# Patient Record
Sex: FEMALE | Race: WHITE | HISPANIC OR LATINO | URBAN - METROPOLITAN AREA
[De-identification: names, ages, dates, MRNs, and addresses within clinical notes are randomized per-mention and may not be internally consistent; named-entity substitution may affect disease eponyms.]

---

## 2017-02-28 ENCOUNTER — EMERGENCY (EMERGENCY)
Facility: HOSPITAL | Age: 52
LOS: 1 days | Discharge: HOME | End: 2017-02-28
Admitting: INTERNAL MEDICINE
Payer: COMMERCIAL

## 2017-02-28 DIAGNOSIS — K52.9 NONINFECTIVE GASTROENTERITIS AND COLITIS, UNSPECIFIED: ICD-10-CM

## 2017-02-28 DIAGNOSIS — S62.102A FRACTURE OF UNSPECIFIED CARPAL BONE, LEFT WRIST, INITIAL ENCOUNTER FOR CLOSED FRACTURE: Chronic | ICD-10-CM

## 2017-02-28 DIAGNOSIS — Z79.84 LONG TERM (CURRENT) USE OF ORAL HYPOGLYCEMIC DRUGS: ICD-10-CM

## 2017-02-28 DIAGNOSIS — Z98.89 OTHER SPECIFIED POSTPROCEDURAL STATES: Chronic | ICD-10-CM

## 2017-02-28 DIAGNOSIS — R42 DIZZINESS AND GIDDINESS: ICD-10-CM

## 2017-02-28 DIAGNOSIS — Z90.710 ACQUIRED ABSENCE OF BOTH CERVIX AND UTERUS: ICD-10-CM

## 2017-02-28 DIAGNOSIS — Z91.018 ALLERGY TO OTHER FOODS: ICD-10-CM

## 2017-02-28 DIAGNOSIS — E11.9 TYPE 2 DIABETES MELLITUS WITHOUT COMPLICATIONS: ICD-10-CM

## 2017-02-28 DIAGNOSIS — Z79.899 OTHER LONG TERM (CURRENT) DRUG THERAPY: ICD-10-CM

## 2017-02-28 DIAGNOSIS — Z98.51 TUBAL LIGATION STATUS: Chronic | ICD-10-CM

## 2017-02-28 DIAGNOSIS — Z91.011 ALLERGY TO MILK PRODUCTS: ICD-10-CM

## 2017-02-28 DIAGNOSIS — N31.8 OTHER NEUROMUSCULAR DYSFUNCTION OF BLADDER: Chronic | ICD-10-CM

## 2017-02-28 DIAGNOSIS — J45.909 UNSPECIFIED ASTHMA, UNCOMPLICATED: ICD-10-CM

## 2017-02-28 DIAGNOSIS — Z90.710 ACQUIRED ABSENCE OF BOTH CERVIX AND UTERUS: Chronic | ICD-10-CM

## 2017-02-28 DIAGNOSIS — E03.9 HYPOTHYROIDISM, UNSPECIFIED: ICD-10-CM

## 2017-02-28 DIAGNOSIS — N39.3 STRESS INCONTINENCE (FEMALE) (MALE): Chronic | ICD-10-CM

## 2017-02-28 PROCEDURE — 93010 ELECTROCARDIOGRAM REPORT: CPT

## 2017-02-28 PROCEDURE — 99285 EMERGENCY DEPT VISIT HI MDM: CPT | Mod: 25

## 2017-03-01 PROCEDURE — 82550 ASSAY OF CK (CPK): CPT

## 2017-03-01 PROCEDURE — 96375 TX/PRO/DX INJ NEW DRUG ADDON: CPT

## 2017-03-01 PROCEDURE — 96365 THER/PROPH/DIAG IV INF INIT: CPT

## 2017-03-01 PROCEDURE — 84484 ASSAY OF TROPONIN QUANT: CPT

## 2017-03-01 PROCEDURE — 85027 COMPLETE CBC AUTOMATED: CPT

## 2017-03-01 PROCEDURE — 82962 GLUCOSE BLOOD TEST: CPT

## 2017-03-01 PROCEDURE — 80048 BASIC METABOLIC PNL TOTAL CA: CPT

## 2017-03-01 PROCEDURE — 83690 ASSAY OF LIPASE: CPT

## 2017-03-01 PROCEDURE — 99284 EMERGENCY DEPT VISIT MOD MDM: CPT | Mod: 25

## 2017-03-01 PROCEDURE — 93005 ELECTROCARDIOGRAM TRACING: CPT

## 2017-03-01 PROCEDURE — 85610 PROTHROMBIN TIME: CPT

## 2017-03-01 PROCEDURE — 80076 HEPATIC FUNCTION PANEL: CPT

## 2017-03-01 PROCEDURE — 82553 CREATINE MB FRACTION: CPT

## 2017-03-01 PROCEDURE — 96376 TX/PRO/DX INJ SAME DRUG ADON: CPT

## 2017-03-01 PROCEDURE — 85730 THROMBOPLASTIN TIME PARTIAL: CPT

## 2017-03-24 ENCOUNTER — OUTPATIENT (OUTPATIENT)
Dept: OUTPATIENT SERVICES | Facility: HOSPITAL | Age: 52
LOS: 1 days | End: 2017-03-24
Payer: COMMERCIAL

## 2017-03-24 VITALS
HEART RATE: 84 BPM | HEIGHT: 62 IN | TEMPERATURE: 98 F | WEIGHT: 132.06 LBS | SYSTOLIC BLOOD PRESSURE: 172 MMHG | RESPIRATION RATE: 16 BRPM | DIASTOLIC BLOOD PRESSURE: 97 MMHG

## 2017-03-24 DIAGNOSIS — Z98.89 OTHER SPECIFIED POSTPROCEDURAL STATES: Chronic | ICD-10-CM

## 2017-03-24 DIAGNOSIS — N39.3 STRESS INCONTINENCE (FEMALE) (MALE): Chronic | ICD-10-CM

## 2017-03-24 DIAGNOSIS — N31.8 OTHER NEUROMUSCULAR DYSFUNCTION OF BLADDER: Chronic | ICD-10-CM

## 2017-03-24 DIAGNOSIS — M65.331 TRIGGER FINGER, RIGHT MIDDLE FINGER: ICD-10-CM

## 2017-03-24 DIAGNOSIS — J45.909 UNSPECIFIED ASTHMA, UNCOMPLICATED: ICD-10-CM

## 2017-03-24 DIAGNOSIS — G56.01 CARPAL TUNNEL SYNDROME, RIGHT UPPER LIMB: ICD-10-CM

## 2017-03-24 DIAGNOSIS — Z01.818 ENCOUNTER FOR OTHER PREPROCEDURAL EXAMINATION: ICD-10-CM

## 2017-03-24 DIAGNOSIS — Z98.51 TUBAL LIGATION STATUS: Chronic | ICD-10-CM

## 2017-03-24 DIAGNOSIS — E11.9 TYPE 2 DIABETES MELLITUS WITHOUT COMPLICATIONS: ICD-10-CM

## 2017-03-24 DIAGNOSIS — S62.102A FRACTURE OF UNSPECIFIED CARPAL BONE, LEFT WRIST, INITIAL ENCOUNTER FOR CLOSED FRACTURE: Chronic | ICD-10-CM

## 2017-03-24 DIAGNOSIS — Z90.710 ACQUIRED ABSENCE OF BOTH CERVIX AND UTERUS: Chronic | ICD-10-CM

## 2017-03-24 LAB
ALBUMIN SERPL ELPH-MCNC: 3.9 G/DL — SIGNIFICANT CHANGE UP (ref 3.3–5)
ALP SERPL-CCNC: 135 U/L — HIGH (ref 40–120)
ALT FLD-CCNC: 37 U/L — SIGNIFICANT CHANGE UP (ref 12–78)
ANION GAP SERPL CALC-SCNC: 9 MMOL/L — SIGNIFICANT CHANGE UP (ref 5–17)
AST SERPL-CCNC: 15 U/L — SIGNIFICANT CHANGE UP (ref 15–37)
BILIRUB SERPL-MCNC: 0.2 MG/DL — SIGNIFICANT CHANGE UP (ref 0.2–1.2)
BUN SERPL-MCNC: 10 MG/DL — SIGNIFICANT CHANGE UP (ref 7–23)
CALCIUM SERPL-MCNC: 9.3 MG/DL — SIGNIFICANT CHANGE UP (ref 8.5–10.1)
CHLORIDE SERPL-SCNC: 103 MMOL/L — SIGNIFICANT CHANGE UP (ref 96–108)
CO2 SERPL-SCNC: 28 MMOL/L — SIGNIFICANT CHANGE UP (ref 22–31)
CREAT SERPL-MCNC: 0.45 MG/DL — LOW (ref 0.5–1.3)
GLUCOSE SERPL-MCNC: 94 MG/DL — SIGNIFICANT CHANGE UP (ref 70–99)
HBA1C BLD-MCNC: 6.1 % — HIGH (ref 4–5.6)
HCG UR QL: NEGATIVE — SIGNIFICANT CHANGE UP
HCT VFR BLD CALC: 38.8 % — SIGNIFICANT CHANGE UP (ref 34.5–45)
HGB BLD-MCNC: 12.9 G/DL — SIGNIFICANT CHANGE UP (ref 11.5–15.5)
MCHC RBC-ENTMCNC: 31.7 PG — SIGNIFICANT CHANGE UP (ref 27–34)
MCHC RBC-ENTMCNC: 33.3 GM/DL — SIGNIFICANT CHANGE UP (ref 32–36)
MCV RBC AUTO: 95.2 FL — SIGNIFICANT CHANGE UP (ref 80–100)
PLATELET # BLD AUTO: 269 K/UL — SIGNIFICANT CHANGE UP (ref 150–400)
POTASSIUM SERPL-MCNC: 3.8 MMOL/L — SIGNIFICANT CHANGE UP (ref 3.5–5.3)
POTASSIUM SERPL-SCNC: 3.8 MMOL/L — SIGNIFICANT CHANGE UP (ref 3.5–5.3)
PROT SERPL-MCNC: 7.6 G/DL — SIGNIFICANT CHANGE UP (ref 6–8.3)
RBC # BLD: 4.07 M/UL — SIGNIFICANT CHANGE UP (ref 3.8–5.2)
RBC # FLD: 12.2 % — SIGNIFICANT CHANGE UP (ref 10.3–14.5)
SODIUM SERPL-SCNC: 140 MMOL/L — SIGNIFICANT CHANGE UP (ref 135–145)
WBC # BLD: 5.6 K/UL — SIGNIFICANT CHANGE UP (ref 3.8–10.5)
WBC # FLD AUTO: 5.6 K/UL — SIGNIFICANT CHANGE UP (ref 3.8–10.5)

## 2017-03-24 PROCEDURE — 80053 COMPREHEN METABOLIC PANEL: CPT

## 2017-03-24 PROCEDURE — 93010 ELECTROCARDIOGRAM REPORT: CPT | Mod: NC

## 2017-03-24 PROCEDURE — 83036 HEMOGLOBIN GLYCOSYLATED A1C: CPT

## 2017-03-24 PROCEDURE — 85027 COMPLETE CBC AUTOMATED: CPT

## 2017-03-24 PROCEDURE — 93005 ELECTROCARDIOGRAM TRACING: CPT

## 2017-03-24 PROCEDURE — 81025 URINE PREGNANCY TEST: CPT

## 2017-03-24 PROCEDURE — G0463: CPT

## 2017-03-24 RX ORDER — SODIUM CHLORIDE 9 MG/ML
1000 INJECTION, SOLUTION INTRAVENOUS
Qty: 0 | Refills: 0 | Status: DISCONTINUED | OUTPATIENT
Start: 2017-03-31 | End: 2017-03-31

## 2017-03-24 NOTE — H&P PST ADULT - NSANTHOSAYNRD_GEN_A_CORE
No. CAROL screening performed.  STOP BANG Legend: 0-2 = LOW Risk; 3-4 = INTERMEDIATE Risk; 5-8 = HIGH Risk

## 2017-03-24 NOTE — H&P PST ADULT - FAMILY HISTORY
Father  Still living? Yes, Estimated age: 87  Family history of diabetes mellitus (DM), Age at diagnosis: Age Unknown  Family history of BPH, Age at diagnosis: Age Unknown     Mother  Still living? Unknown  Family history of diabetes mellitus (DM), Age at diagnosis: Age Unknown  Family history of hypertension, Age at diagnosis: Age Unknown

## 2017-03-24 NOTE — H&P PST ADULT - ASSESSMENT
51 yo female scheduled for Right Carpal Tunnel Release - Trigger Finger Release Right 3rd Digit on 3/31/17 with Dr Ayers.

## 2017-03-24 NOTE — H&P PST ADULT - REASON FOR ADMISSION
Right Carpal Tunnel release and release of right Right Carpal Tunnel Release and Release of Right Third Finger

## 2017-03-24 NOTE — H&P PST ADULT - PMH
Asthma with allergic rhinitis    Asthma, stable    Carpal tunnel syndrome, right upper limb    Depression  controlled with meds  Difficulty urinating  laura for last 1 month  Dyslipidemia    Fibroids  uterine  Fibromyalgia    Foot fracture, right  s/p fall on 9/3/2014, no surgery recommended,, foot brace maintained f/u with Dr. Ld Jay (orthopedic)  Gastroparesis    Hypothyroid    Left knee pain    Ovarian cyst  left  Prediabetes    Stress incontinence in female    Thyroid nodule  s/p biopsy (benign)  Trigger finger, right middle finger Asthma with allergic rhinitis    Asthma, stable    Carpal tunnel syndrome, right upper limb    Depression  controlled with meds  Difficulty urinating  laura for last 1 month  Dyslipidemia    Fibroids  uterine  Fibromyalgia    Foot fracture, right  s/p fall on 9/3/2014, no surgery recommended,, foot brace maintained f/u with Dr. Ld Jay (orthopedic)  Gastroparesis    Hypothyroid    Left knee pain    Ovarian cyst  left  Prediabetes    Stress incontinence in female    Thyroid nodule  s/p biopsy (benign)  Trigger finger, right middle finger    Type 2 diabetes mellitus

## 2017-03-24 NOTE — H&P PST ADULT - HISTORY OF PRESENT ILLNESS
51 yo female scheduled for Right Carpal Tunnel Release - Trigger Finger Release Right 3rd Digit on 3/31/17 with Dr Ayers  Patient states she has pain in both hands with numbness in both hands. Pain wakes her up at night  right third finger has been locking for many years 53 yo female scheduled for Right Carpal Tunnel Release - Trigger Finger Release Right 3rd Digit on 3/31/17 with Dr Ayers.  Patient states she has pain in both hands with numbness since December 2016. Patient states the pain wakes her up at night.  The right third finger has been locking for many years.

## 2017-03-24 NOTE — H&P PST ADULT - NEGATIVE ENMT SYMPTOMS
no nasal discharge/no hearing difficulty/no sinus symptoms/no ear pain/no nasal congestion/no tinnitus

## 2017-03-24 NOTE — H&P PST ADULT - PROBLEM SELECTOR PLAN 1
51 yo female scheduled for Right Carpal Tunnel Release - Trigger Finger Release Right 3rd Digit on 3/31/17 with Dr Ayers.   Check labs CBC CMP HGB A1C   UCG  EKG  Medical Clearance  3/24 stop Meloxicam and Multivitamin  Morning of surgery take Gabapentin, Synthroid and Nortriptyline with a tiny sip of water   No evening dose of Metformin 33/30  Hibiclens and preop instructions were given  Patient verbalizes understanding of instructions

## 2017-03-24 NOTE — H&P PST ADULT - PROBLEM SELECTOR PLAN 4
Patient is aware that if her HGB A1C is 9 or greater she will need and Endocrine Consult before surgery  Fingerstick on admission  Hypoglycemia protocol

## 2017-03-24 NOTE — H&P PST ADULT - PSH
Female stress incontinence  sling procedure 2012  H/O abdominal hysterectomy  2014  H/O arthroscopic knee surgery  left  H/O bilateral inguinal hernia repair    H/O cervical polypectomy    H/O colonoscopy with polypectomy  benign  H/O colposcopy with cervical biopsy    H/O tubal ligation    H/O:     Left wrist fracture  10 years ago with repair and placement of pins  Other functional disorder of bladder  bladder suspension for incontience

## 2017-03-29 RX ORDER — SODIUM CHLORIDE 9 MG/ML
1000 INJECTION, SOLUTION INTRAVENOUS
Qty: 0 | Refills: 0 | Status: DISCONTINUED | OUTPATIENT
Start: 2017-03-31 | End: 2017-03-31

## 2017-03-29 RX ORDER — ACETAMINOPHEN 500 MG
650 TABLET ORAL ONCE
Qty: 0 | Refills: 0 | Status: COMPLETED | OUTPATIENT
Start: 2017-03-31 | End: 2017-03-31

## 2017-03-30 ENCOUNTER — RESULT REVIEW (OUTPATIENT)
Age: 52
End: 2017-03-30

## 2017-03-31 ENCOUNTER — OUTPATIENT (OUTPATIENT)
Dept: OUTPATIENT SERVICES | Facility: HOSPITAL | Age: 52
LOS: 1 days | Discharge: HOME | End: 2017-03-31
Payer: COMMERCIAL

## 2017-03-31 ENCOUNTER — TRANSCRIPTION ENCOUNTER (OUTPATIENT)
Age: 52
End: 2017-03-31

## 2017-03-31 VITALS
WEIGHT: 130.07 LBS | SYSTOLIC BLOOD PRESSURE: 134 MMHG | DIASTOLIC BLOOD PRESSURE: 77 MMHG | TEMPERATURE: 98 F | RESPIRATION RATE: 16 BRPM | HEIGHT: 62 IN | HEART RATE: 90 BPM | OXYGEN SATURATION: 99 %

## 2017-03-31 VITALS
HEART RATE: 100 BPM | DIASTOLIC BLOOD PRESSURE: 90 MMHG | RESPIRATION RATE: 14 BRPM | SYSTOLIC BLOOD PRESSURE: 146 MMHG | OXYGEN SATURATION: 97 %

## 2017-03-31 DIAGNOSIS — Z98.89 OTHER SPECIFIED POSTPROCEDURAL STATES: Chronic | ICD-10-CM

## 2017-03-31 DIAGNOSIS — S62.102A FRACTURE OF UNSPECIFIED CARPAL BONE, LEFT WRIST, INITIAL ENCOUNTER FOR CLOSED FRACTURE: Chronic | ICD-10-CM

## 2017-03-31 DIAGNOSIS — G56.01 CARPAL TUNNEL SYNDROME, RIGHT UPPER LIMB: ICD-10-CM

## 2017-03-31 DIAGNOSIS — N39.3 STRESS INCONTINENCE (FEMALE) (MALE): Chronic | ICD-10-CM

## 2017-03-31 DIAGNOSIS — M65.331 TRIGGER FINGER, RIGHT MIDDLE FINGER: ICD-10-CM

## 2017-03-31 DIAGNOSIS — Z98.51 TUBAL LIGATION STATUS: Chronic | ICD-10-CM

## 2017-03-31 DIAGNOSIS — Z90.710 ACQUIRED ABSENCE OF BOTH CERVIX AND UTERUS: Chronic | ICD-10-CM

## 2017-03-31 DIAGNOSIS — N31.8 OTHER NEUROMUSCULAR DYSFUNCTION OF BLADDER: Chronic | ICD-10-CM

## 2017-03-31 PROCEDURE — 64721 CARPAL TUNNEL SURGERY: CPT | Mod: RT

## 2017-03-31 PROCEDURE — 88304 TISSUE EXAM BY PATHOLOGIST: CPT | Mod: 26

## 2017-03-31 PROCEDURE — 88304 TISSUE EXAM BY PATHOLOGIST: CPT

## 2017-03-31 PROCEDURE — 26145 TENDON EXCISION PALM/FINGER: CPT | Mod: F7

## 2017-03-31 RX ORDER — HYDROMORPHONE HYDROCHLORIDE 2 MG/ML
0.5 INJECTION INTRAMUSCULAR; INTRAVENOUS; SUBCUTANEOUS
Qty: 0 | Refills: 0 | Status: DISCONTINUED | OUTPATIENT
Start: 2017-03-31 | End: 2017-04-02

## 2017-03-31 RX ORDER — SODIUM CHLORIDE 9 MG/ML
1000 INJECTION, SOLUTION INTRAVENOUS
Qty: 0 | Refills: 0 | Status: DISCONTINUED | OUTPATIENT
Start: 2017-03-31 | End: 2017-04-02

## 2017-03-31 RX ORDER — ONDANSETRON 8 MG/1
4 TABLET, FILM COATED ORAL ONCE
Qty: 0 | Refills: 0 | Status: COMPLETED | OUTPATIENT
Start: 2017-03-31 | End: 2017-03-31

## 2017-03-31 RX ORDER — CEFAZOLIN SODIUM 1 G
2000 VIAL (EA) INJECTION ONCE
Qty: 0 | Refills: 0 | Status: COMPLETED | OUTPATIENT
Start: 2017-03-31 | End: 2017-03-31

## 2017-03-31 RX ORDER — OXYCODONE HYDROCHLORIDE 5 MG/1
5 TABLET ORAL EVERY 4 HOURS
Qty: 0 | Refills: 0 | Status: DISCONTINUED | OUTPATIENT
Start: 2017-03-31 | End: 2017-04-02

## 2017-03-31 RX ADMIN — SODIUM CHLORIDE 75 MILLILITER(S): 9 INJECTION, SOLUTION INTRAVENOUS at 11:47

## 2017-03-31 RX ADMIN — ONDANSETRON 4 MILLIGRAM(S): 8 TABLET, FILM COATED ORAL at 14:09

## 2017-03-31 RX ADMIN — Medication 650 MILLIGRAM(S): at 11:47

## 2017-03-31 RX ADMIN — SODIUM CHLORIDE 100 MILLILITER(S): 9 INJECTION, SOLUTION INTRAVENOUS at 14:12

## 2017-03-31 NOTE — ASU DISCHARGE PLAN (ADULT/PEDIATRIC). - MEDICATION SUMMARY - MEDICATIONS TO TAKE
I will START or STAY ON the medications listed below when I get home from the hospital:    meloxicam  --  by mouth   -- Indication: For Home med    oxyCODONE-acetaminophen 5 mg-325 mg oral tablet  -- 1 tab(s) by mouth every 4 hours, As Needed -for severe pain MDD:6  -- Caution federal law prohibits the transfer of this drug to any person other  than the person for whom it was prescribed.  May cause drowsiness.  Alcohol may intensify this effect.  Use care when operating dangerous machinery.  This prescription cannot be refilled.  This product contains acetaminophen.  Do not use  with any other product containing acetaminophen to prevent possible liver damage.  Using more of this medication than prescribed may cause serious breathing problems.    -- Indication: For PRN Severe pain    nortriptyline  -- 10 milligram(s) by mouth 2 times a day  -- Indication: For Home med    nortriptyline 10 mg oral capsule  -- 2 cap(s) by mouth once a day (at bedtime)  -- Indication: For Home med    metFORMIN 500 mg oral tablet  --  by mouth 2 times a day  -- Indication: For Home med    meclizine  --  by mouth , As Needed  -- Indication: For Home med    ProAir HFA 90 mcg/inh inhalation aerosol  -- 2 puff(s) inhaled 4 times a day  -- Indication: For Home med    cyclobenzaprine  --  by mouth , As Needed  -- Indication: For Home med    Zegerid 20 mg-1100 mg oral capsule  -- 1 cap(s) by mouth once a day  -- Indication: For Home med    Synthroid 75 mcg (0.075 mg) oral tablet  -- 1 tab(s) by mouth once a day  -- Indication: For Home med    multivitamin  --     -- Indication: For Home med    Calcium 500+D oral tablet, chewable  --  by mouth , As Needed  -- Indication: For Home med    Vitamin D3  --  by mouth   -- Indication: For Home med

## 2017-03-31 NOTE — ASU PATIENT PROFILE, ADULT - PMH
Asthma with allergic rhinitis  seasonal last use of inhaler 1months ago  Depression  controlled with meds  Dyslipidemia    Fibroids  uterine  Fibromyalgia    Foot fracture, right  s/p fall on 9/3/2014, no surgery recommended,, foot brace maintained f/u with Dr. Ld Jay (orthopedic)  Gastroparesis    Hypothyroid    Iron deficiency anemia  on iron pills  Left knee pain    Ovarian cyst  left  Prediabetes    Stress incontinence in female  s/p bladder lift up  Thyroid nodule  s/p biopsy (benign)

## 2017-03-31 NOTE — ASU PATIENT PROFILE, ADULT - PSH
Female stress incontinence  sling procedure   H/O bilateral inguinal hernia repair    H/O cervical polypectomy    H/O colonoscopy with polypectomy  benign  H/O colposcopy with cervical biopsy    H/O tubal ligation    H/O:     Left wrist fracture  10 years ago with repair and placement of pins  Other functional disorder of bladder  bladder suspension for incontience

## 2017-03-31 NOTE — ASU DISCHARGE PLAN (ADULT/PEDIATRIC). - NOTIFY
Pain not relieved by Medications/Fever greater than 101/Numbness, color, or temperature change to extremity

## 2017-03-31 NOTE — BRIEF OPERATIVE NOTE - PRE-OP DX
Carpal tunnel syndrome of right wrist  03/31/2017  And R hand middle finger trigger finger  Active  Red Vargas

## 2017-03-31 NOTE — ASU DISCHARGE PLAN (ADULT/PEDIATRIC). - DRESSING FT
Keep dressing clean dry and intact until seen in office. No lifting with R hand. Elevate for swellin

## 2017-04-03 LAB — SURGICAL PATHOLOGY FINAL REPORT - CH: SIGNIFICANT CHANGE UP

## 2017-04-05 DIAGNOSIS — J45.909 UNSPECIFIED ASTHMA, UNCOMPLICATED: ICD-10-CM

## 2017-04-05 DIAGNOSIS — G56.01 CARPAL TUNNEL SYNDROME, RIGHT UPPER LIMB: ICD-10-CM

## 2017-04-05 DIAGNOSIS — Z91.018 ALLERGY TO OTHER FOODS: ICD-10-CM

## 2017-04-05 DIAGNOSIS — M65.841 OTHER SYNOVITIS AND TENOSYNOVITIS, RIGHT HAND: ICD-10-CM

## 2017-04-05 DIAGNOSIS — E03.9 HYPOTHYROIDISM, UNSPECIFIED: ICD-10-CM

## 2017-04-05 DIAGNOSIS — F32.9 MAJOR DEPRESSIVE DISORDER, SINGLE EPISODE, UNSPECIFIED: ICD-10-CM

## 2017-04-05 DIAGNOSIS — Z91.010 ALLERGY TO PEANUTS: ICD-10-CM

## 2017-04-05 DIAGNOSIS — M65.331 TRIGGER FINGER, RIGHT MIDDLE FINGER: ICD-10-CM

## 2017-04-05 DIAGNOSIS — E73.9 LACTOSE INTOLERANCE, UNSPECIFIED: ICD-10-CM

## 2017-04-05 DIAGNOSIS — E11.9 TYPE 2 DIABETES MELLITUS WITHOUT COMPLICATIONS: ICD-10-CM

## 2017-04-05 DIAGNOSIS — E78.5 HYPERLIPIDEMIA, UNSPECIFIED: ICD-10-CM

## 2017-04-19 ENCOUNTER — OUTPATIENT (OUTPATIENT)
Dept: OUTPATIENT SERVICES | Facility: HOSPITAL | Age: 52
LOS: 1 days | End: 2017-04-19
Payer: COMMERCIAL

## 2017-04-19 VITALS
WEIGHT: 130.07 LBS | TEMPERATURE: 98 F | DIASTOLIC BLOOD PRESSURE: 83 MMHG | SYSTOLIC BLOOD PRESSURE: 139 MMHG | RESPIRATION RATE: 14 BRPM | HEART RATE: 96 BPM | HEIGHT: 62 IN

## 2017-04-19 DIAGNOSIS — Z01.818 ENCOUNTER FOR OTHER PREPROCEDURAL EXAMINATION: ICD-10-CM

## 2017-04-19 DIAGNOSIS — M65.332 TRIGGER FINGER, LEFT MIDDLE FINGER: ICD-10-CM

## 2017-04-19 DIAGNOSIS — Z98.89 OTHER SPECIFIED POSTPROCEDURAL STATES: Chronic | ICD-10-CM

## 2017-04-19 DIAGNOSIS — Z90.710 ACQUIRED ABSENCE OF BOTH CERVIX AND UTERUS: Chronic | ICD-10-CM

## 2017-04-19 DIAGNOSIS — S62.102A FRACTURE OF UNSPECIFIED CARPAL BONE, LEFT WRIST, INITIAL ENCOUNTER FOR CLOSED FRACTURE: Chronic | ICD-10-CM

## 2017-04-19 DIAGNOSIS — Z98.890 OTHER SPECIFIED POSTPROCEDURAL STATES: Chronic | ICD-10-CM

## 2017-04-19 DIAGNOSIS — N39.3 STRESS INCONTINENCE (FEMALE) (MALE): Chronic | ICD-10-CM

## 2017-04-19 DIAGNOSIS — G56.02 CARPAL TUNNEL SYNDROME, LEFT UPPER LIMB: ICD-10-CM

## 2017-04-19 DIAGNOSIS — N31.8 OTHER NEUROMUSCULAR DYSFUNCTION OF BLADDER: Chronic | ICD-10-CM

## 2017-04-19 DIAGNOSIS — Z98.51 TUBAL LIGATION STATUS: Chronic | ICD-10-CM

## 2017-04-19 LAB
ANION GAP SERPL CALC-SCNC: 6 MMOL/L — SIGNIFICANT CHANGE UP (ref 5–17)
APPEARANCE UR: CLEAR — SIGNIFICANT CHANGE UP
BILIRUB UR-MCNC: NEGATIVE — SIGNIFICANT CHANGE UP
BUN SERPL-MCNC: 13 MG/DL — SIGNIFICANT CHANGE UP (ref 7–23)
CALCIUM SERPL-MCNC: 9.5 MG/DL — SIGNIFICANT CHANGE UP (ref 8.5–10.1)
CHLORIDE SERPL-SCNC: 103 MMOL/L — SIGNIFICANT CHANGE UP (ref 96–108)
CO2 SERPL-SCNC: 31 MMOL/L — SIGNIFICANT CHANGE UP (ref 22–31)
COLOR SPEC: YELLOW — SIGNIFICANT CHANGE UP
CREAT SERPL-MCNC: 0.55 MG/DL — SIGNIFICANT CHANGE UP (ref 0.5–1.3)
DIFF PNL FLD: NEGATIVE — SIGNIFICANT CHANGE UP
GLUCOSE SERPL-MCNC: 91 MG/DL — SIGNIFICANT CHANGE UP (ref 70–99)
GLUCOSE UR QL: NEGATIVE — SIGNIFICANT CHANGE UP
HCG UR QL: NEGATIVE — SIGNIFICANT CHANGE UP
HCT VFR BLD CALC: 40.5 % — SIGNIFICANT CHANGE UP (ref 34.5–45)
HGB BLD-MCNC: 12.8 G/DL — SIGNIFICANT CHANGE UP (ref 11.5–15.5)
KETONES UR-MCNC: NEGATIVE — SIGNIFICANT CHANGE UP
LEUKOCYTE ESTERASE UR-ACNC: NEGATIVE — SIGNIFICANT CHANGE UP
MCHC RBC-ENTMCNC: 31.5 GM/DL — LOW (ref 32–36)
MCHC RBC-ENTMCNC: 31.8 PG — SIGNIFICANT CHANGE UP (ref 27–34)
MCV RBC AUTO: 100.7 FL — HIGH (ref 80–100)
NITRITE UR-MCNC: NEGATIVE — SIGNIFICANT CHANGE UP
PH UR: 7 — SIGNIFICANT CHANGE UP (ref 5–8)
PLATELET # BLD AUTO: 261 K/UL — SIGNIFICANT CHANGE UP (ref 150–400)
POTASSIUM SERPL-MCNC: 4.1 MMOL/L — SIGNIFICANT CHANGE UP (ref 3.5–5.3)
POTASSIUM SERPL-SCNC: 4.1 MMOL/L — SIGNIFICANT CHANGE UP (ref 3.5–5.3)
PROT UR-MCNC: NEGATIVE — SIGNIFICANT CHANGE UP
RBC # BLD: 4.03 M/UL — SIGNIFICANT CHANGE UP (ref 3.8–5.2)
RBC # FLD: 12.6 % — SIGNIFICANT CHANGE UP (ref 10.3–14.5)
SODIUM SERPL-SCNC: 140 MMOL/L — SIGNIFICANT CHANGE UP (ref 135–145)
SP GR SPEC: 1 — LOW (ref 1.01–1.02)
UROBILINOGEN FLD QL: NEGATIVE — SIGNIFICANT CHANGE UP
WBC # BLD: 5.7 K/UL — SIGNIFICANT CHANGE UP (ref 3.8–10.5)
WBC # FLD AUTO: 5.7 K/UL — SIGNIFICANT CHANGE UP (ref 3.8–10.5)

## 2017-04-19 PROCEDURE — 87086 URINE CULTURE/COLONY COUNT: CPT

## 2017-04-19 PROCEDURE — 81003 URINALYSIS AUTO W/O SCOPE: CPT

## 2017-04-19 PROCEDURE — 81025 URINE PREGNANCY TEST: CPT

## 2017-04-19 PROCEDURE — 80048 BASIC METABOLIC PNL TOTAL CA: CPT

## 2017-04-19 PROCEDURE — 85027 COMPLETE CBC AUTOMATED: CPT

## 2017-04-19 PROCEDURE — G0463: CPT

## 2017-04-19 RX ORDER — MELOXICAM 15 MG/1
0 TABLET ORAL
Qty: 0 | Refills: 0 | COMMUNITY

## 2017-04-19 NOTE — H&P PST ADULT - PROBLEM SELECTOR PLAN 1
PST labs; CBC, CMP, UcG, U/A Urine Culture (done secondary to c/o Dysuria h/o UTI's), EKG (on chart from March 2017) - - Medical Clearance scheduled for 4/21/17 - pre-op instructions as well as pre-op wash instructions given to pt with understanding verbalized

## 2017-04-19 NOTE — H&P PST ADULT - MUSCULOSKELETAL COMMENTS
LEFT Wrist/hand pain secondary to carpal tunnel - decreased ROM - since Cortisone injection 4 weeks ago pt able to open fingers better decreased discomfort to LEFT Trigger Finger Decreased ROM/Strength LEFT Hand good movement of fingers left hand can open and close since cortisone Injection 4 weeks ago

## 2017-04-19 NOTE — H&P PST ADULT - ASSESSMENT
52 year old female with Carpal Tunnel Syndrome LEFT upper Limb and Trigger Finger LEFT Middle Finger - scheduled for LEFT Carpal Tunnel Release - Trigger Finger Release LEFT Long Digit with Dr Efrain Ayers on 4/26/17

## 2017-04-19 NOTE — H&P PST ADULT - HISTORY OF PRESENT ILLNESS
52 year old female presents for PST prior to LEFT Carpal Tunnel Release - Trigger Finger Release LEFT Long Digit with Dr kate Ayers on 4/26/17 - pt notes she is s/p right carpal tunnel and right long finger trigger finger release March 2017 - pt notes she is now back for same procedure on LEFT Hand - notes LEFT Hand Carpal tunnel/trigger finger pain started about 2 years ago and pt  uses Tylenol as needed for pain. Pt is electing for scheduled procedure-

## 2017-04-19 NOTE — H&P PST ADULT - VISION (WITH CORRECTIVE LENSES IF THE PATIENT USUALLY WEARS THEM):
Normal vision: sees adequately in most situations; can see medication labels, newsprint Normal vision: sees adequately in most situations; can see medication labels, newsprint/Wears RX Eyeglasses

## 2017-04-19 NOTE — H&P PST ADULT - RS GEN PE MLT RESP DETAILS PC
clear to auscultation bilaterally/breath sounds equal/good air movement/respirations non-labored/airway patent

## 2017-04-19 NOTE — H&P PST ADULT - NEGATIVE ENMT SYMPTOMS
no sinus symptoms/no vertigo/no nasal discharge/no hearing difficulty/no nasal obstruction/no nasal congestion

## 2017-04-19 NOTE — H&P PST ADULT - PMH
Asthma with allergic rhinitis    Asthma, stable    Carpal tunnel syndrome, left upper limb    Carpal tunnel syndrome, right upper limb    Depression  controlled with meds  Difficulty urinating  laura for last 1 month  Dyslipidemia    Fibroids  uterine  Fibromyalgia    Foot fracture, right  s/p fall on 9/3/2014, no surgery recommended,, foot brace maintained f/u with Dr. Ld Jay (orthopedic)  Gastroparesis    Hypothyroid    Left knee pain    Ovarian cyst  left  Prediabetes    Stress incontinence in female    Thyroid nodule  s/p biopsy (benign)  Trigger finger, left middle finger    Trigger finger, right middle finger    Type 2 diabetes mellitus

## 2017-04-20 LAB
CULTURE RESULTS: SIGNIFICANT CHANGE UP
SPECIMEN SOURCE: SIGNIFICANT CHANGE UP

## 2017-04-25 RX ORDER — SODIUM CHLORIDE 9 MG/ML
1000 INJECTION, SOLUTION INTRAVENOUS
Qty: 0 | Refills: 0 | Status: DISCONTINUED | OUTPATIENT
Start: 2017-04-26 | End: 2017-04-26

## 2017-04-25 RX ORDER — ACETAMINOPHEN 500 MG
650 TABLET ORAL ONCE
Qty: 0 | Refills: 0 | Status: COMPLETED | OUTPATIENT
Start: 2017-04-26 | End: 2017-04-26

## 2017-04-26 ENCOUNTER — TRANSCRIPTION ENCOUNTER (OUTPATIENT)
Age: 52
End: 2017-04-26

## 2017-04-26 ENCOUNTER — OUTPATIENT (OUTPATIENT)
Dept: OUTPATIENT SERVICES | Facility: HOSPITAL | Age: 52
LOS: 1 days | Discharge: HOME | End: 2017-04-26
Payer: COMMERCIAL

## 2017-04-26 VITALS
WEIGHT: 130.07 LBS | HEIGHT: 62 IN | OXYGEN SATURATION: 98 % | HEART RATE: 85 BPM | RESPIRATION RATE: 15 BRPM | SYSTOLIC BLOOD PRESSURE: 140 MMHG | TEMPERATURE: 98 F | DIASTOLIC BLOOD PRESSURE: 77 MMHG

## 2017-04-26 VITALS — HEART RATE: 80 BPM | RESPIRATION RATE: 14 BRPM | DIASTOLIC BLOOD PRESSURE: 70 MMHG | SYSTOLIC BLOOD PRESSURE: 130 MMHG

## 2017-04-26 DIAGNOSIS — Z98.890 OTHER SPECIFIED POSTPROCEDURAL STATES: Chronic | ICD-10-CM

## 2017-04-26 DIAGNOSIS — M65.332 TRIGGER FINGER, LEFT MIDDLE FINGER: ICD-10-CM

## 2017-04-26 DIAGNOSIS — G56.02 CARPAL TUNNEL SYNDROME, LEFT UPPER LIMB: ICD-10-CM

## 2017-04-26 DIAGNOSIS — Z98.89 OTHER SPECIFIED POSTPROCEDURAL STATES: Chronic | ICD-10-CM

## 2017-04-26 DIAGNOSIS — N31.8 OTHER NEUROMUSCULAR DYSFUNCTION OF BLADDER: Chronic | ICD-10-CM

## 2017-04-26 DIAGNOSIS — Z98.51 TUBAL LIGATION STATUS: Chronic | ICD-10-CM

## 2017-04-26 DIAGNOSIS — N39.3 STRESS INCONTINENCE (FEMALE) (MALE): Chronic | ICD-10-CM

## 2017-04-26 DIAGNOSIS — Z90.710 ACQUIRED ABSENCE OF BOTH CERVIX AND UTERUS: Chronic | ICD-10-CM

## 2017-04-26 DIAGNOSIS — S62.102A FRACTURE OF UNSPECIFIED CARPAL BONE, LEFT WRIST, INITIAL ENCOUNTER FOR CLOSED FRACTURE: Chronic | ICD-10-CM

## 2017-04-26 PROCEDURE — 26055 INCISE FINGER TENDON SHEATH: CPT | Mod: F2

## 2017-04-26 PROCEDURE — 64721 CARPAL TUNNEL SURGERY: CPT | Mod: LT

## 2017-04-26 RX ORDER — METOCLOPRAMIDE HCL 10 MG
10 TABLET ORAL ONCE
Qty: 0 | Refills: 0 | Status: DISCONTINUED | OUTPATIENT
Start: 2017-04-26 | End: 2017-04-26

## 2017-04-26 RX ORDER — MEPERIDINE HYDROCHLORIDE 50 MG/ML
12.5 INJECTION INTRAMUSCULAR; INTRAVENOUS; SUBCUTANEOUS
Qty: 0 | Refills: 0 | Status: DISCONTINUED | OUTPATIENT
Start: 2017-04-26 | End: 2017-04-26

## 2017-04-26 RX ORDER — SODIUM CHLORIDE 9 MG/ML
1000 INJECTION, SOLUTION INTRAVENOUS
Qty: 0 | Refills: 0 | Status: DISCONTINUED | OUTPATIENT
Start: 2017-04-26 | End: 2017-05-11

## 2017-04-26 RX ORDER — DEXTROSE 50 % IN WATER 50 %
1 SYRINGE (ML) INTRAVENOUS ONCE
Qty: 0 | Refills: 0 | Status: DISCONTINUED | OUTPATIENT
Start: 2017-04-26 | End: 2017-05-11

## 2017-04-26 RX ORDER — DEXTROSE 50 % IN WATER 50 %
25 SYRINGE (ML) INTRAVENOUS ONCE
Qty: 0 | Refills: 0 | Status: DISCONTINUED | OUTPATIENT
Start: 2017-04-26 | End: 2017-05-11

## 2017-04-26 RX ORDER — SODIUM CHLORIDE 9 MG/ML
1000 INJECTION, SOLUTION INTRAVENOUS
Qty: 0 | Refills: 0 | Status: DISCONTINUED | OUTPATIENT
Start: 2017-04-26 | End: 2017-04-26

## 2017-04-26 RX ORDER — ONDANSETRON 8 MG/1
4 TABLET, FILM COATED ORAL ONCE
Qty: 0 | Refills: 0 | Status: COMPLETED | OUTPATIENT
Start: 2017-04-26 | End: 2017-04-26

## 2017-04-26 RX ORDER — HYDROMORPHONE HYDROCHLORIDE 2 MG/ML
0.5 INJECTION INTRAMUSCULAR; INTRAVENOUS; SUBCUTANEOUS
Qty: 0 | Refills: 0 | Status: DISCONTINUED | OUTPATIENT
Start: 2017-04-26 | End: 2017-04-26

## 2017-04-26 RX ORDER — DEXTROSE 50 % IN WATER 50 %
12.5 SYRINGE (ML) INTRAVENOUS ONCE
Qty: 0 | Refills: 0 | Status: DISCONTINUED | OUTPATIENT
Start: 2017-04-26 | End: 2017-05-11

## 2017-04-26 RX ORDER — GLUCAGON INJECTION, SOLUTION 0.5 MG/.1ML
1 INJECTION, SOLUTION SUBCUTANEOUS ONCE
Qty: 0 | Refills: 0 | Status: DISCONTINUED | OUTPATIENT
Start: 2017-04-26 | End: 2017-05-11

## 2017-04-26 RX ORDER — CEFAZOLIN SODIUM 1 G
2000 VIAL (EA) INJECTION ONCE
Qty: 0 | Refills: 0 | Status: COMPLETED | OUTPATIENT
Start: 2017-04-26 | End: 2017-04-26

## 2017-04-26 RX ADMIN — Medication 650 MILLIGRAM(S): at 09:09

## 2017-04-26 RX ADMIN — ONDANSETRON 4 MILLIGRAM(S): 8 TABLET, FILM COATED ORAL at 13:14

## 2017-04-26 RX ADMIN — SODIUM CHLORIDE 60 MILLILITER(S): 9 INJECTION, SOLUTION INTRAVENOUS at 09:09

## 2017-04-26 RX ADMIN — SODIUM CHLORIDE 75 MILLILITER(S): 9 INJECTION, SOLUTION INTRAVENOUS at 11:43

## 2017-04-26 RX ADMIN — HYDROMORPHONE HYDROCHLORIDE 0.5 MILLIGRAM(S): 2 INJECTION INTRAMUSCULAR; INTRAVENOUS; SUBCUTANEOUS at 12:18

## 2017-04-26 RX ADMIN — HYDROMORPHONE HYDROCHLORIDE 0.5 MILLIGRAM(S): 2 INJECTION INTRAMUSCULAR; INTRAVENOUS; SUBCUTANEOUS at 11:47

## 2017-04-26 NOTE — ASU DISCHARGE PLAN (ADULT/PEDIATRIC). - MEDICATION SUMMARY - MEDICATIONS TO TAKE
I will START or STAY ON the medications listed below when I get home from the hospital:    Percocet 5/325 325 mg-5 mg oral tablet  -- 1 tab(s) by mouth every 4 hours, As Needed MDD:6  -- Caution federal law prohibits the transfer of this drug to any person other  than the person for whom it was prescribed.  May cause drowsiness.  Alcohol may intensify this effect.  Use care when operating dangerous machinery.  This prescription cannot be refilled.  This product contains acetaminophen.  Do not use  with any other product containing acetaminophen to prevent possible liver damage.  Using more of this medication than prescribed may cause serious breathing problems.    -- Indication: For prn pain    meloxicam  --  by mouth 3 times a week, As Needed PRN Pain  -- Indication: For per pmd    nortriptyline  -- 10 milligram(s) by mouth 2 times a day  -- Indication: For per pmd    metFORMIN 500 mg oral tablet  --  by mouth 2 times a day  -- Indication: For per pmd    meclizine  --  by mouth , As Needed  -- Indication: For per pmd    ZyrTEC  -- 20 milligram(s) by mouth once a day  -- Indication: For per pmd    ProAir HFA 90 mcg/inh inhalation aerosol  -- 2 puff(s) inhaled 4 times a day  -- Indication: For per pmd    cyclobenzaprine  --  by mouth , As Needed  -- Indication: For per pmc    Zegerid 20 mg-1100 mg oral capsule  -- 1 cap(s) by mouth once a day  -- Indication: For per pmd    Synthroid 75 mcg (0.075 mg) oral tablet  -- 1 tab(s) by mouth once a day  -- Indication: For per pmd    Calcium 500+D oral tablet, chewable  --  by mouth , As Needed  -- Indication: For per pmd    multivitamin  --     -- Indication: For per pmd    Vitamin D3  --  by mouth   -- Indication: For per pmd

## 2017-04-26 NOTE — BRIEF OPERATIVE NOTE - POST-OP DX
Carpal tunnel syndrome  04/26/2017    Active  Jessica Palma  Trigger finger  04/26/2017    Active  Jessica Palma

## 2017-04-26 NOTE — ASU PATIENT PROFILE, ADULT - VISION (WITH CORRECTIVE LENSES IF THE PATIENT USUALLY WEARS THEM):
Wears RX Eyeglasses/Normal vision: sees adequately in most situations; can see medication labels, newsprint

## 2017-04-26 NOTE — ASU DISCHARGE PLAN (ADULT/PEDIATRIC). - NOTIFY
Bleeding that does not stop/Swelling that continues/Numbness, color, or temperature change to extremity/Fever greater than 101/Pain not relieved by Medications/Persistent Nausea and Vomiting

## 2017-04-26 NOTE — ASU PATIENT PROFILE, ADULT - PSH
Female stress incontinence  sling procedure   H/O abdominal hysterectomy    H/O arthroscopic knee surgery  left  H/O bilateral inguinal hernia repair    H/O carpal tunnel repair  RIGHT Hand 2017  H/O cervical polypectomy    H/O colonoscopy with polypectomy  benign  H/O colposcopy with cervical biopsy    H/O tubal ligation    H/O:     Left wrist fracture  10 years ago with repair and placement of pins  Other functional disorder of bladder  bladder suspension for incontience  S/P trigger finger release  RIGHT Middle Finger 2017

## 2017-05-01 DIAGNOSIS — E78.5 HYPERLIPIDEMIA, UNSPECIFIED: ICD-10-CM

## 2017-05-01 DIAGNOSIS — M65.332 TRIGGER FINGER, LEFT MIDDLE FINGER: ICD-10-CM

## 2017-05-01 DIAGNOSIS — G56.02 CARPAL TUNNEL SYNDROME, LEFT UPPER LIMB: ICD-10-CM

## 2017-05-01 DIAGNOSIS — M65.842 OTHER SYNOVITIS AND TENOSYNOVITIS, LEFT HAND: ICD-10-CM

## 2017-05-01 DIAGNOSIS — E03.9 HYPOTHYROIDISM, UNSPECIFIED: ICD-10-CM

## 2017-05-01 DIAGNOSIS — Z91.010 ALLERGY TO PEANUTS: ICD-10-CM

## 2017-05-01 DIAGNOSIS — J45.909 UNSPECIFIED ASTHMA, UNCOMPLICATED: ICD-10-CM

## 2017-05-01 DIAGNOSIS — E73.9 LACTOSE INTOLERANCE, UNSPECIFIED: ICD-10-CM

## 2017-05-01 DIAGNOSIS — E11.9 TYPE 2 DIABETES MELLITUS WITHOUT COMPLICATIONS: ICD-10-CM

## 2017-05-01 DIAGNOSIS — Z91.018 ALLERGY TO OTHER FOODS: ICD-10-CM

## 2017-05-01 DIAGNOSIS — K21.9 GASTRO-ESOPHAGEAL REFLUX DISEASE WITHOUT ESOPHAGITIS: ICD-10-CM

## 2017-07-13 ENCOUNTER — EMERGENCY (EMERGENCY)
Facility: HOSPITAL | Age: 52
LOS: 1 days | Discharge: HOME | End: 2017-07-13
Admitting: EMERGENCY MEDICINE
Payer: COMMERCIAL

## 2017-07-13 DIAGNOSIS — Z98.89 OTHER SPECIFIED POSTPROCEDURAL STATES: Chronic | ICD-10-CM

## 2017-07-13 DIAGNOSIS — E11.9 TYPE 2 DIABETES MELLITUS WITHOUT COMPLICATIONS: ICD-10-CM

## 2017-07-13 DIAGNOSIS — Z90.710 ACQUIRED ABSENCE OF BOTH CERVIX AND UTERUS: ICD-10-CM

## 2017-07-13 DIAGNOSIS — R30.0 DYSURIA: ICD-10-CM

## 2017-07-13 DIAGNOSIS — N39.3 STRESS INCONTINENCE (FEMALE) (MALE): Chronic | ICD-10-CM

## 2017-07-13 DIAGNOSIS — S62.102A FRACTURE OF UNSPECIFIED CARPAL BONE, LEFT WRIST, INITIAL ENCOUNTER FOR CLOSED FRACTURE: Chronic | ICD-10-CM

## 2017-07-13 DIAGNOSIS — Z91.018 ALLERGY TO OTHER FOODS: ICD-10-CM

## 2017-07-13 DIAGNOSIS — Z98.890 OTHER SPECIFIED POSTPROCEDURAL STATES: Chronic | ICD-10-CM

## 2017-07-13 DIAGNOSIS — Z91.011 ALLERGY TO MILK PRODUCTS: ICD-10-CM

## 2017-07-13 DIAGNOSIS — Z79.899 OTHER LONG TERM (CURRENT) DRUG THERAPY: ICD-10-CM

## 2017-07-13 DIAGNOSIS — Z98.51 TUBAL LIGATION STATUS: Chronic | ICD-10-CM

## 2017-07-13 DIAGNOSIS — Z90.710 ACQUIRED ABSENCE OF BOTH CERVIX AND UTERUS: Chronic | ICD-10-CM

## 2017-07-13 DIAGNOSIS — N31.8 OTHER NEUROMUSCULAR DYSFUNCTION OF BLADDER: Chronic | ICD-10-CM

## 2017-07-13 DIAGNOSIS — I10 ESSENTIAL (PRIMARY) HYPERTENSION: ICD-10-CM

## 2017-07-13 DIAGNOSIS — Z79.84 LONG TERM (CURRENT) USE OF ORAL HYPOGLYCEMIC DRUGS: ICD-10-CM

## 2017-07-13 DIAGNOSIS — M79.1 MYALGIA: ICD-10-CM

## 2017-07-13 DIAGNOSIS — J45.909 UNSPECIFIED ASTHMA, UNCOMPLICATED: ICD-10-CM

## 2017-07-13 DIAGNOSIS — Z79.51 LONG TERM (CURRENT) USE OF INHALED STEROIDS: ICD-10-CM

## 2017-07-13 PROCEDURE — 99284 EMERGENCY DEPT VISIT MOD MDM: CPT

## 2017-07-13 PROCEDURE — 99282 EMERGENCY DEPT VISIT SF MDM: CPT

## 2017-07-14 ENCOUNTER — NON-APPOINTMENT (OUTPATIENT)
Age: 52
End: 2017-07-14

## 2017-07-14 ENCOUNTER — APPOINTMENT (OUTPATIENT)
Dept: CARDIOLOGY | Facility: CLINIC | Age: 52
End: 2017-07-14

## 2017-07-14 VITALS
HEART RATE: 108 BPM | OXYGEN SATURATION: 94 % | TEMPERATURE: 97.8 F | SYSTOLIC BLOOD PRESSURE: 143 MMHG | DIASTOLIC BLOOD PRESSURE: 84 MMHG | HEIGHT: 62 IN | BODY MASS INDEX: 25.21 KG/M2 | WEIGHT: 137 LBS

## 2017-07-14 VITALS — DIASTOLIC BLOOD PRESSURE: 84 MMHG | SYSTOLIC BLOOD PRESSURE: 129 MMHG

## 2017-07-14 DIAGNOSIS — E07.9 DISORDER OF THYROID, UNSPECIFIED: ICD-10-CM

## 2017-07-14 DIAGNOSIS — E11.9 TYPE 2 DIABETES MELLITUS W/OUT COMPLICATIONS: ICD-10-CM

## 2017-07-14 DIAGNOSIS — E78.00 PURE HYPERCHOLESTEROLEMIA, UNSPECIFIED: ICD-10-CM

## 2017-07-14 DIAGNOSIS — I10 ESSENTIAL (PRIMARY) HYPERTENSION: ICD-10-CM

## 2017-07-14 RX ORDER — GABAPENTIN 300 MG/1
300 CAPSULE ORAL
Qty: 90 | Refills: 0 | Status: ACTIVE | COMMUNITY
Start: 2016-12-20

## 2017-07-19 LAB
25(OH)D3 SERPL-MCNC: 68.5 NG/ML
ALBUMIN SERPL ELPH-MCNC: 4.5 G/DL
ALP BLD-CCNC: 139 U/L
ALT SERPL-CCNC: 54 U/L
ANION GAP SERPL CALC-SCNC: 13 MMOL/L
AST SERPL-CCNC: 35 U/L
BASOPHILS # BLD AUTO: 0.01 K/UL
BASOPHILS NFR BLD AUTO: 0.2 %
BILIRUB SERPL-MCNC: <0.2 MG/DL
BUN SERPL-MCNC: 11 MG/DL
CALCIUM SERPL-MCNC: 10.1 MG/DL
CHLORIDE SERPL-SCNC: 101 MMOL/L
CHOLEST SERPL-MCNC: 266 MG/DL
CHOLEST/HDLC SERPL: 4.3 RATIO
CO2 SERPL-SCNC: 26 MMOL/L
CREAT SERPL-MCNC: 0.67 MG/DL
EOSINOPHIL # BLD AUTO: 0.08 K/UL
EOSINOPHIL NFR BLD AUTO: 1.7 %
GLUCOSE SERPL-MCNC: 101 MG/DL
HBA1C MFR BLD HPLC: 5.5 %
HCT VFR BLD CALC: 38.7 %
HDLC SERPL-MCNC: 62 MG/DL
HGB BLD-MCNC: 12.5 G/DL
IMM GRANULOCYTES NFR BLD AUTO: 0.2 %
LDLC SERPL CALC-MCNC: 171 MG/DL
LYMPHOCYTES # BLD AUTO: 1.67 K/UL
LYMPHOCYTES NFR BLD AUTO: 34.5 %
MAN DIFF?: NORMAL
MCHC RBC-ENTMCNC: 32.3 GM/DL
MCHC RBC-ENTMCNC: 32.4 PG
MCV RBC AUTO: 100.3 FL
METANEPHS UR-SCNC: NORMAL
MONOCYTES # BLD AUTO: 0.38 K/UL
MONOCYTES NFR BLD AUTO: 7.9 %
NEUTROPHILS # BLD AUTO: 2.69 K/UL
NEUTROPHILS NFR BLD AUTO: 55.5 %
PLATELET # BLD AUTO: 271 K/UL
POTASSIUM SERPL-SCNC: 4.5 MMOL/L
PROT SERPL-MCNC: 7.6 G/DL
RBC # BLD: 3.86 M/UL
RBC # FLD: 13.8 %
SODIUM SERPL-SCNC: 140 MMOL/L
TRIGL SERPL-MCNC: 166 MG/DL
TSH SERPL-ACNC: 0.49 UIU/ML
WBC # FLD AUTO: 4.84 K/UL

## 2017-07-24 LAB
METANEPHRINE, PL: <10 PG/ML
NORMETANEPHRINE, PL: 180 PG/ML

## 2017-08-03 ENCOUNTER — OUTPATIENT (OUTPATIENT)
Dept: OUTPATIENT SERVICES | Facility: HOSPITAL | Age: 52
LOS: 1 days | Discharge: HOME | End: 2017-08-03
Payer: COMMERCIAL

## 2017-08-03 ENCOUNTER — TRANSCRIPTION ENCOUNTER (OUTPATIENT)
Age: 52
End: 2017-08-03

## 2017-08-03 DIAGNOSIS — Z98.89 OTHER SPECIFIED POSTPROCEDURAL STATES: Chronic | ICD-10-CM

## 2017-08-03 DIAGNOSIS — K63.89 OTHER SPECIFIED DISEASES OF INTESTINE: ICD-10-CM

## 2017-08-03 DIAGNOSIS — S62.102A FRACTURE OF UNSPECIFIED CARPAL BONE, LEFT WRIST, INITIAL ENCOUNTER FOR CLOSED FRACTURE: Chronic | ICD-10-CM

## 2017-08-03 DIAGNOSIS — K62.5 HEMORRHAGE OF ANUS AND RECTUM: ICD-10-CM

## 2017-08-03 DIAGNOSIS — Z98.51 TUBAL LIGATION STATUS: Chronic | ICD-10-CM

## 2017-08-03 DIAGNOSIS — Z90.710 ACQUIRED ABSENCE OF BOTH CERVIX AND UTERUS: Chronic | ICD-10-CM

## 2017-08-03 DIAGNOSIS — K21.9 GASTRO-ESOPHAGEAL REFLUX DISEASE WITHOUT ESOPHAGITIS: ICD-10-CM

## 2017-08-03 DIAGNOSIS — K64.8 OTHER HEMORRHOIDS: ICD-10-CM

## 2017-08-03 DIAGNOSIS — M79.7 FIBROMYALGIA: ICD-10-CM

## 2017-08-03 DIAGNOSIS — N31.8 OTHER NEUROMUSCULAR DYSFUNCTION OF BLADDER: Chronic | ICD-10-CM

## 2017-08-03 DIAGNOSIS — Z98.890 OTHER SPECIFIED POSTPROCEDURAL STATES: Chronic | ICD-10-CM

## 2017-08-03 DIAGNOSIS — N39.3 STRESS INCONTINENCE (FEMALE) (MALE): Chronic | ICD-10-CM

## 2017-08-03 DIAGNOSIS — J45.909 UNSPECIFIED ASTHMA, UNCOMPLICATED: ICD-10-CM

## 2017-08-03 DIAGNOSIS — K62.1 RECTAL POLYP: ICD-10-CM

## 2017-08-03 DIAGNOSIS — E11.9 TYPE 2 DIABETES MELLITUS WITHOUT COMPLICATIONS: ICD-10-CM

## 2017-08-04 ENCOUNTER — RESULT REVIEW (OUTPATIENT)
Age: 52
End: 2017-08-04

## 2017-08-04 PROCEDURE — 88305 TISSUE EXAM BY PATHOLOGIST: CPT | Mod: 26

## 2017-08-04 PROCEDURE — 45380 COLONOSCOPY AND BIOPSY: CPT

## 2017-08-04 PROCEDURE — 88305 TISSUE EXAM BY PATHOLOGIST: CPT

## 2017-08-08 ENCOUNTER — APPOINTMENT (OUTPATIENT)
Dept: UROGYNECOLOGY | Facility: CLINIC | Age: 52
End: 2017-08-08
Payer: COMMERCIAL

## 2017-08-08 DIAGNOSIS — N94.89 OTHER SPECIFIED CONDITIONS ASSOCIATED WITH FEMALE GENITAL ORGANS AND MENSTRUAL CYCLE: ICD-10-CM

## 2017-08-08 PROCEDURE — 51701 INSERT BLADDER CATHETER: CPT

## 2017-08-08 PROCEDURE — 99214 OFFICE O/P EST MOD 30 MIN: CPT | Mod: 25

## 2017-08-08 PROCEDURE — 81003 URINALYSIS AUTO W/O SCOPE: CPT | Mod: QW

## 2017-08-09 ENCOUNTER — RESULT REVIEW (OUTPATIENT)
Age: 52
End: 2017-08-09

## 2017-08-10 ENCOUNTER — RESULT REVIEW (OUTPATIENT)
Age: 52
End: 2017-08-10

## 2017-08-10 LAB
APPEARANCE: CLEAR
BACTERIA UR CULT: NORMAL
BACTERIA: NEGATIVE
BILIRUBIN URINE: NEGATIVE
BLOOD URINE: NEGATIVE
COLOR: YELLOW
GLUCOSE QUALITATIVE U: NORMAL MG/DL
HYALINE CASTS: 3 /LPF
KETONES URINE: NEGATIVE
LEUKOCYTE ESTERASE URINE: ABNORMAL
MICROSCOPIC-UA: NORMAL
NITRITE URINE: NEGATIVE
PH URINE: 7
PROTEIN URINE: NEGATIVE MG/DL
RED BLOOD CELLS URINE: 0 /HPF
SPECIFIC GRAVITY URINE: 1
SQUAMOUS EPITHELIAL CELLS: 2 /HPF
UROBILINOGEN URINE: NORMAL MG/DL
WHITE BLOOD CELLS URINE: 2 /HPF

## 2017-11-07 ENCOUNTER — APPOINTMENT (OUTPATIENT)
Dept: UROGYNECOLOGY | Facility: CLINIC | Age: 52
End: 2017-11-07
Payer: COMMERCIAL

## 2017-11-07 LAB
BILIRUB UR QL STRIP: NORMAL
CLARITY UR: CLEAR
COLLECTION METHOD: NORMAL
GLUCOSE UR-MCNC: NORMAL
HCG UR QL: 0.2 EU/DL
HGB UR QL STRIP.AUTO: NORMAL
KETONES UR-MCNC: NORMAL
LEUKOCYTE ESTERASE UR QL STRIP: NORMAL
NITRITE UR QL STRIP: NORMAL
PH UR STRIP: 7.5
PROT UR STRIP-MCNC: NORMAL
SP GR UR STRIP: 1.01

## 2017-11-07 PROCEDURE — 99214 OFFICE O/P EST MOD 30 MIN: CPT | Mod: 25

## 2017-11-07 PROCEDURE — 81003 URINALYSIS AUTO W/O SCOPE: CPT | Mod: QW

## 2017-11-07 PROCEDURE — 51701 INSERT BLADDER CATHETER: CPT

## 2017-11-08 ENCOUNTER — APPOINTMENT (OUTPATIENT)
Dept: UROGYNECOLOGY | Facility: CLINIC | Age: 52
End: 2017-11-08
Payer: COMMERCIAL

## 2017-11-08 DIAGNOSIS — N95.2 POSTMENOPAUSAL ATROPHIC VAGINITIS: ICD-10-CM

## 2017-11-08 PROCEDURE — 99213 OFFICE O/P EST LOW 20 MIN: CPT

## 2017-11-08 RX ORDER — ALBUTEROL SULFATE 90 UG/1
108 (90 BASE) POWDER, METERED RESPIRATORY (INHALATION)
Qty: 1 | Refills: 0 | Status: DISCONTINUED | COMMUNITY
Start: 2017-10-19

## 2017-11-08 RX ORDER — METOPROLOL SUCCINATE 25 MG/1
25 TABLET, EXTENDED RELEASE ORAL
Qty: 30 | Refills: 0 | Status: DISCONTINUED | COMMUNITY
Start: 2017-10-13

## 2017-11-08 RX ORDER — AMOXICILLIN AND CLAVULANATE POTASSIUM 875; 125 MG/1; MG/1
875-125 TABLET, COATED ORAL
Qty: 20 | Refills: 0 | Status: DISCONTINUED | COMMUNITY
Start: 2017-09-27

## 2017-11-08 RX ORDER — NORTRIPTYLINE HYDROCHLORIDE 10 MG/1
10 CAPSULE ORAL
Qty: 90 | Refills: 0 | Status: DISCONTINUED | COMMUNITY
Start: 2017-08-09

## 2017-11-08 RX ORDER — MOMETASONE FUROATE 1 MG/G
0.1 CREAM TOPICAL
Qty: 15 | Refills: 0 | Status: DISCONTINUED | COMMUNITY
Start: 2017-08-08

## 2017-11-08 RX ORDER — CYCLOBENZAPRINE HYDROCHLORIDE 10 MG/1
10 TABLET, FILM COATED ORAL
Qty: 30 | Refills: 0 | Status: DISCONTINUED | COMMUNITY
Start: 2017-09-19

## 2017-11-08 RX ORDER — LEVOTHYROXINE SODIUM 0.07 MG/1
75 TABLET ORAL
Qty: 30 | Refills: 0 | Status: DISCONTINUED | COMMUNITY
Start: 2017-10-19

## 2017-12-21 ENCOUNTER — RX RENEWAL (OUTPATIENT)
Age: 52
End: 2017-12-21

## 2018-05-14 ENCOUNTER — APPOINTMENT (OUTPATIENT)
Dept: ORTHOPEDIC SURGERY | Facility: CLINIC | Age: 53
End: 2018-05-14
Payer: COMMERCIAL

## 2018-05-14 VITALS
HEART RATE: 106 BPM | HEIGHT: 63 IN | DIASTOLIC BLOOD PRESSURE: 88 MMHG | WEIGHT: 130 LBS | BODY MASS INDEX: 23.04 KG/M2 | SYSTOLIC BLOOD PRESSURE: 129 MMHG

## 2018-05-14 PROCEDURE — 73030 X-RAY EXAM OF SHOULDER: CPT | Mod: 50

## 2018-05-14 PROCEDURE — 99203 OFFICE O/P NEW LOW 30 MIN: CPT

## 2018-05-23 ENCOUNTER — APPOINTMENT (OUTPATIENT)
Dept: SURGERY | Facility: CLINIC | Age: 53
End: 2018-05-23
Payer: COMMERCIAL

## 2018-05-23 VITALS
HEART RATE: 98 BPM | SYSTOLIC BLOOD PRESSURE: 139 MMHG | OXYGEN SATURATION: 98 % | DIASTOLIC BLOOD PRESSURE: 86 MMHG | TEMPERATURE: 98.1 F | RESPIRATION RATE: 16 BRPM

## 2018-05-23 DIAGNOSIS — K21.9 GASTRO-ESOPHAGEAL REFLUX DISEASE W/OUT ESOPHAGITIS: ICD-10-CM

## 2018-05-23 DIAGNOSIS — R15.9 FULL INCONTINENCE OF FECES: ICD-10-CM

## 2018-05-23 DIAGNOSIS — Z83.3 FAMILY HISTORY OF DIABETES MELLITUS: ICD-10-CM

## 2018-05-23 DIAGNOSIS — E03.9 HYPOTHYROIDISM, UNSPECIFIED: ICD-10-CM

## 2018-05-23 PROCEDURE — 46600 DIAGNOSTIC ANOSCOPY SPX: CPT

## 2018-05-23 PROCEDURE — 99243 OFF/OP CNSLTJ NEW/EST LOW 30: CPT | Mod: 25

## 2018-05-23 RX ORDER — DIAZEPAM 100 %
POWDER (GRAM) MISCELLANEOUS
Qty: 30 | Refills: 0 | Status: DISCONTINUED | COMMUNITY
Start: 2017-08-08 | End: 2018-05-23

## 2018-05-23 RX ORDER — LISINOPRIL 5 MG/1
5 TABLET ORAL DAILY
Qty: 30 | Refills: 3 | Status: DISCONTINUED | COMMUNITY
Start: 2017-07-14 | End: 2018-05-23

## 2018-05-23 RX ORDER — METOPROLOL SUCCINATE 25 MG/1
25 TABLET, EXTENDED RELEASE ORAL
Refills: 0 | Status: ACTIVE | COMMUNITY

## 2018-05-23 RX ORDER — OMEPRAZOLE AND SODIUM BICARBONATE 20; 1100 MG/1; MG/1
CAPSULE ORAL
Refills: 0 | Status: ACTIVE | COMMUNITY

## 2018-05-23 RX ORDER — CLOTRIMAZOLE AND BETAMETHASONE DIPROPIONATE 10; .5 MG/G; MG/G
1-0.05 CREAM TOPICAL
Qty: 1 | Refills: 3 | Status: DISCONTINUED | COMMUNITY
Start: 2017-11-07 | End: 2018-05-23

## 2018-05-23 RX ORDER — ESTRADIOL 10 UG/1
10 TABLET VAGINAL
Qty: 8 | Refills: 0 | Status: DISCONTINUED | COMMUNITY
Start: 2016-12-23 | End: 2018-05-23

## 2018-05-23 RX ORDER — ALBUTEROL SULFATE 90 UG/1
108 (90 BASE) AEROSOL, METERED RESPIRATORY (INHALATION)
Refills: 0 | Status: ACTIVE | COMMUNITY

## 2018-10-01 PROBLEM — M65.332 TRIGGER FINGER, LEFT MIDDLE FINGER: Chronic | Status: ACTIVE | Noted: 2017-04-19

## 2018-10-01 PROBLEM — E11.9 TYPE 2 DIABETES MELLITUS WITHOUT COMPLICATIONS: Chronic | Status: ACTIVE | Noted: 2017-03-24

## 2018-10-01 PROBLEM — J45.909 UNSPECIFIED ASTHMA, UNCOMPLICATED: Chronic | Status: ACTIVE | Noted: 2017-03-24

## 2018-10-01 PROBLEM — G56.02 CARPAL TUNNEL SYNDROME, LEFT UPPER LIMB: Chronic | Status: ACTIVE | Noted: 2017-04-19

## 2018-10-01 PROBLEM — G56.01 CARPAL TUNNEL SYNDROME, RIGHT UPPER LIMB: Chronic | Status: ACTIVE | Noted: 2017-03-24

## 2018-10-01 PROBLEM — M65.331 TRIGGER FINGER, RIGHT MIDDLE FINGER: Chronic | Status: ACTIVE | Noted: 2017-03-24

## 2018-10-03 ENCOUNTER — APPOINTMENT (OUTPATIENT)
Dept: ORTHOPEDIC SURGERY | Facility: CLINIC | Age: 53
End: 2018-10-03
Payer: COMMERCIAL

## 2018-10-03 DIAGNOSIS — G89.29 PAIN IN RIGHT HIP: ICD-10-CM

## 2018-10-03 DIAGNOSIS — J45.909 UNSPECIFIED ASTHMA, UNCOMPLICATED: ICD-10-CM

## 2018-10-03 DIAGNOSIS — M25.551 PAIN IN RIGHT HIP: ICD-10-CM

## 2018-10-03 DIAGNOSIS — M25.552 PAIN IN RIGHT HIP: ICD-10-CM

## 2018-10-03 PROCEDURE — 99214 OFFICE O/P EST MOD 30 MIN: CPT

## 2018-10-03 PROCEDURE — 73562 X-RAY EXAM OF KNEE 3: CPT | Mod: RT

## 2018-10-03 PROCEDURE — 73522 X-RAY EXAM HIPS BI 3-4 VIEWS: CPT

## 2018-10-04 ENCOUNTER — APPOINTMENT (OUTPATIENT)
Dept: UROGYNECOLOGY | Facility: CLINIC | Age: 53
End: 2018-10-04
Payer: COMMERCIAL

## 2018-10-04 PROBLEM — M25.551 CHRONIC PAIN OF BOTH HIPS: Status: ACTIVE | Noted: 2018-10-03

## 2018-10-04 LAB
BILIRUB UR QL STRIP: NORMAL
CLARITY UR: CLEAR
COLLECTION METHOD: NORMAL
GLUCOSE UR-MCNC: NORMAL
HCG UR QL: 0.2 EU/DL
HGB UR QL STRIP.AUTO: NORMAL
KETONES UR-MCNC: NORMAL
LEUKOCYTE ESTERASE UR QL STRIP: NORMAL
NITRITE UR QL STRIP: NORMAL
PH UR STRIP: 8.5
PROT UR STRIP-MCNC: NORMAL
SP GR UR STRIP: 1.01

## 2018-10-04 PROCEDURE — 51701 INSERT BLADDER CATHETER: CPT

## 2018-10-04 PROCEDURE — 99214 OFFICE O/P EST MOD 30 MIN: CPT | Mod: 25

## 2018-10-08 ENCOUNTER — APPOINTMENT (OUTPATIENT)
Dept: ORTHOPEDIC SURGERY | Facility: CLINIC | Age: 53
End: 2018-10-08
Payer: COMMERCIAL

## 2018-10-08 VITALS
WEIGHT: 130 LBS | HEIGHT: 63 IN | SYSTOLIC BLOOD PRESSURE: 152 MMHG | BODY MASS INDEX: 23.04 KG/M2 | DIASTOLIC BLOOD PRESSURE: 84 MMHG | HEART RATE: 109 BPM

## 2018-10-08 PROCEDURE — 99213 OFFICE O/P EST LOW 20 MIN: CPT

## 2018-10-09 ENCOUNTER — OTHER (OUTPATIENT)
Age: 53
End: 2018-10-09

## 2018-10-09 ENCOUNTER — TRANSCRIPTION ENCOUNTER (OUTPATIENT)
Age: 53
End: 2018-10-09

## 2018-10-10 ENCOUNTER — APPOINTMENT (OUTPATIENT)
Dept: ORTHOPEDIC SURGERY | Facility: CLINIC | Age: 53
End: 2018-10-10

## 2018-10-10 ENCOUNTER — APPOINTMENT (OUTPATIENT)
Dept: ORTHOPEDIC SURGERY | Facility: CLINIC | Age: 53
End: 2018-10-10
Payer: COMMERCIAL

## 2018-10-10 VITALS
BODY MASS INDEX: 23.04 KG/M2 | DIASTOLIC BLOOD PRESSURE: 78 MMHG | WEIGHT: 130 LBS | SYSTOLIC BLOOD PRESSURE: 137 MMHG | HEART RATE: 94 BPM | HEIGHT: 63 IN

## 2018-10-10 DIAGNOSIS — Z78.9 OTHER SPECIFIED HEALTH STATUS: ICD-10-CM

## 2018-10-10 DIAGNOSIS — G56.01 CARPAL TUNNEL SYNDROME, RIGHT UPPER LIMB: ICD-10-CM

## 2018-10-10 DIAGNOSIS — M79.641 PAIN IN RIGHT HAND: ICD-10-CM

## 2018-10-10 DIAGNOSIS — Z87.39 PERSONAL HISTORY OF OTHER DISEASES OF THE MUSCULOSKELETAL SYSTEM AND CONNECTIVE TISSUE: ICD-10-CM

## 2018-10-10 DIAGNOSIS — Z82.61 FAMILY HISTORY OF ARTHRITIS: ICD-10-CM

## 2018-10-10 DIAGNOSIS — G56.02 CARPAL TUNNEL SYNDROME, LEFT UPPER LIMB: ICD-10-CM

## 2018-10-10 DIAGNOSIS — M79.642 PAIN IN LEFT HAND: ICD-10-CM

## 2018-10-10 DIAGNOSIS — Z82.69 FAMILY HISTORY OF OTHER DISEASES OF THE MUSCULOSKELETAL SYSTEM AND CONNECTIVE TISSUE: ICD-10-CM

## 2018-10-10 PROCEDURE — 99203 OFFICE O/P NEW LOW 30 MIN: CPT

## 2018-10-11 ENCOUNTER — OTHER (OUTPATIENT)
Age: 53
End: 2018-10-11

## 2018-10-12 PROBLEM — M79.641 RIGHT HAND PAIN: Status: ACTIVE | Noted: 2018-10-12

## 2018-10-12 PROBLEM — M79.642 LEFT HAND PAIN: Status: ACTIVE | Noted: 2018-10-12

## 2018-10-17 ENCOUNTER — APPOINTMENT (OUTPATIENT)
Dept: ORTHOPEDIC SURGERY | Facility: CLINIC | Age: 53
End: 2018-10-17
Payer: COMMERCIAL

## 2018-10-17 VITALS
SYSTOLIC BLOOD PRESSURE: 131 MMHG | HEIGHT: 63 IN | HEART RATE: 92 BPM | DIASTOLIC BLOOD PRESSURE: 77 MMHG | BODY MASS INDEX: 22.68 KG/M2 | WEIGHT: 128 LBS

## 2018-10-17 DIAGNOSIS — M41.9 SCOLIOSIS, UNSPECIFIED: ICD-10-CM

## 2018-10-17 DIAGNOSIS — M47.817 SPONDYLOSIS W/OUT MYELOPATHY OR RADICULOPATHY, LUMBOSACRAL REGION: ICD-10-CM

## 2018-10-17 PROCEDURE — 72100 X-RAY EXAM L-S SPINE 2/3 VWS: CPT

## 2018-10-17 PROCEDURE — 99214 OFFICE O/P EST MOD 30 MIN: CPT

## 2018-10-19 ENCOUNTER — APPOINTMENT (OUTPATIENT)
Dept: UROGYNECOLOGY | Facility: CLINIC | Age: 53
End: 2018-10-19
Payer: COMMERCIAL

## 2018-10-19 ENCOUNTER — OUTPATIENT (OUTPATIENT)
Dept: OUTPATIENT SERVICES | Facility: HOSPITAL | Age: 53
LOS: 1 days | End: 2018-10-19
Payer: COMMERCIAL

## 2018-10-19 DIAGNOSIS — Z98.51 TUBAL LIGATION STATUS: Chronic | ICD-10-CM

## 2018-10-19 DIAGNOSIS — Z98.89 OTHER SPECIFIED POSTPROCEDURAL STATES: Chronic | ICD-10-CM

## 2018-10-19 DIAGNOSIS — Z90.710 ACQUIRED ABSENCE OF BOTH CERVIX AND UTERUS: Chronic | ICD-10-CM

## 2018-10-19 DIAGNOSIS — S62.102A FRACTURE OF UNSPECIFIED CARPAL BONE, LEFT WRIST, INITIAL ENCOUNTER FOR CLOSED FRACTURE: Chronic | ICD-10-CM

## 2018-10-19 DIAGNOSIS — R35.0 FREQUENCY OF MICTURITION: ICD-10-CM

## 2018-10-19 DIAGNOSIS — N32.81 OVERACTIVE BLADDER: ICD-10-CM

## 2018-10-19 DIAGNOSIS — Z01.818 ENCOUNTER FOR OTHER PREPROCEDURAL EXAMINATION: ICD-10-CM

## 2018-10-19 DIAGNOSIS — N39.46 MIXED INCONTINENCE: ICD-10-CM

## 2018-10-19 DIAGNOSIS — N39.3 STRESS INCONTINENCE (FEMALE) (MALE): Chronic | ICD-10-CM

## 2018-10-19 DIAGNOSIS — N31.8 OTHER NEUROMUSCULAR DYSFUNCTION OF BLADDER: Chronic | ICD-10-CM

## 2018-10-19 DIAGNOSIS — Z98.890 OTHER SPECIFIED POSTPROCEDURAL STATES: Chronic | ICD-10-CM

## 2018-10-19 DIAGNOSIS — N39.3 STRESS INCONTINENCE (FEMALE) (MALE): ICD-10-CM

## 2018-10-19 DIAGNOSIS — R39.15 URGENCY OF URINATION: ICD-10-CM

## 2018-10-19 PROCEDURE — 51784 ANAL/URINARY MUSCLE STUDY: CPT

## 2018-10-19 PROCEDURE — 51729 CYSTOMETROGRAM W/VP&UP: CPT | Mod: 26

## 2018-10-19 PROCEDURE — 51797 INTRAABDOMINAL PRESSURE TEST: CPT | Mod: 26

## 2018-10-19 PROCEDURE — 51797 INTRAABDOMINAL PRESSURE TEST: CPT

## 2018-10-19 PROCEDURE — 51729 CYSTOMETROGRAM W/VP&UP: CPT

## 2018-10-19 PROCEDURE — 51784 ANAL/URINARY MUSCLE STUDY: CPT | Mod: 26

## 2018-10-22 ENCOUNTER — APPOINTMENT (OUTPATIENT)
Dept: ORTHOPEDIC SURGERY | Facility: CLINIC | Age: 53
End: 2018-10-22
Payer: COMMERCIAL

## 2018-10-22 VITALS
HEIGHT: 63 IN | SYSTOLIC BLOOD PRESSURE: 140 MMHG | BODY MASS INDEX: 22.68 KG/M2 | HEART RATE: 89 BPM | WEIGHT: 128 LBS | DIASTOLIC BLOOD PRESSURE: 80 MMHG

## 2018-10-22 DIAGNOSIS — N39.46 MIXED INCONTINENCE: ICD-10-CM

## 2018-10-22 DIAGNOSIS — N39.3 STRESS INCONTINENCE (FEMALE) (MALE): ICD-10-CM

## 2018-10-22 PROCEDURE — 99214 OFFICE O/P EST MOD 30 MIN: CPT

## 2018-10-23 ENCOUNTER — APPOINTMENT (OUTPATIENT)
Dept: UROGYNECOLOGY | Facility: CLINIC | Age: 53
End: 2018-10-23
Payer: COMMERCIAL

## 2018-10-23 PROCEDURE — 99214 OFFICE O/P EST MOD 30 MIN: CPT

## 2018-10-29 ENCOUNTER — MESSAGE (OUTPATIENT)
Age: 53
End: 2018-10-29

## 2018-10-29 RX ORDER — SOLIFENACIN SUCCINATE 5 MG/1
5 TABLET, FILM COATED ORAL DAILY
Qty: 90 | Refills: 3 | Status: DISCONTINUED | COMMUNITY
Start: 2018-10-23 | End: 2018-10-29

## 2018-11-02 ENCOUNTER — OTHER (OUTPATIENT)
Age: 53
End: 2018-11-02

## 2018-11-13 ENCOUNTER — OUTPATIENT (OUTPATIENT)
Dept: OUTPATIENT SERVICES | Facility: HOSPITAL | Age: 53
LOS: 1 days | End: 2018-11-13
Payer: COMMERCIAL

## 2018-11-13 VITALS
HEART RATE: 80 BPM | WEIGHT: 126.1 LBS | RESPIRATION RATE: 16 BRPM | TEMPERATURE: 99 F | HEIGHT: 61 IN | DIASTOLIC BLOOD PRESSURE: 80 MMHG | OXYGEN SATURATION: 99 % | SYSTOLIC BLOOD PRESSURE: 130 MMHG

## 2018-11-13 DIAGNOSIS — N31.8 OTHER NEUROMUSCULAR DYSFUNCTION OF BLADDER: Chronic | ICD-10-CM

## 2018-11-13 DIAGNOSIS — Z98.89 OTHER SPECIFIED POSTPROCEDURAL STATES: Chronic | ICD-10-CM

## 2018-11-13 DIAGNOSIS — Z90.710 ACQUIRED ABSENCE OF BOTH CERVIX AND UTERUS: Chronic | ICD-10-CM

## 2018-11-13 DIAGNOSIS — M75.122 COMPLETE ROTATOR CUFF TEAR OR RUPTURE OF LEFT SHOULDER, NOT SPECIFIED AS TRAUMATIC: ICD-10-CM

## 2018-11-13 DIAGNOSIS — Z98.51 TUBAL LIGATION STATUS: Chronic | ICD-10-CM

## 2018-11-13 DIAGNOSIS — Z98.890 OTHER SPECIFIED POSTPROCEDURAL STATES: Chronic | ICD-10-CM

## 2018-11-13 DIAGNOSIS — M75.42 IMPINGEMENT SYNDROME OF LEFT SHOULDER: ICD-10-CM

## 2018-11-13 DIAGNOSIS — S62.102A FRACTURE OF UNSPECIFIED CARPAL BONE, LEFT WRIST, INITIAL ENCOUNTER FOR CLOSED FRACTURE: Chronic | ICD-10-CM

## 2018-11-13 DIAGNOSIS — N39.3 STRESS INCONTINENCE (FEMALE) (MALE): Chronic | ICD-10-CM

## 2018-11-13 LAB
ALBUMIN SERPL ELPH-MCNC: 4.4 G/DL — SIGNIFICANT CHANGE UP (ref 3.3–5)
ALP SERPL-CCNC: 131 U/L — HIGH (ref 40–120)
ALT FLD-CCNC: 17 U/L — SIGNIFICANT CHANGE UP (ref 4–33)
APPEARANCE UR: CLEAR — SIGNIFICANT CHANGE UP
AST SERPL-CCNC: 16 U/L — SIGNIFICANT CHANGE UP (ref 4–32)
BILIRUB SERPL-MCNC: 0.2 MG/DL — SIGNIFICANT CHANGE UP (ref 0.2–1.2)
BILIRUB UR-MCNC: NEGATIVE — SIGNIFICANT CHANGE UP
BLOOD UR QL VISUAL: NEGATIVE — SIGNIFICANT CHANGE UP
BUN SERPL-MCNC: 9 MG/DL — SIGNIFICANT CHANGE UP (ref 7–23)
CALCIUM SERPL-MCNC: 10.3 MG/DL — SIGNIFICANT CHANGE UP (ref 8.4–10.5)
CHLORIDE SERPL-SCNC: 105 MMOL/L — SIGNIFICANT CHANGE UP (ref 98–107)
CO2 SERPL-SCNC: 29 MMOL/L — SIGNIFICANT CHANGE UP (ref 22–31)
COLOR SPEC: COLORLESS — SIGNIFICANT CHANGE UP
CREAT SERPL-MCNC: 0.55 MG/DL — SIGNIFICANT CHANGE UP (ref 0.5–1.3)
GLUCOSE SERPL-MCNC: 111 MG/DL — HIGH (ref 70–99)
GLUCOSE UR-MCNC: NEGATIVE — SIGNIFICANT CHANGE UP
HBA1C BLD-MCNC: 5.8 % — HIGH (ref 4–5.6)
HCT VFR BLD CALC: 36.4 % — SIGNIFICANT CHANGE UP (ref 34.5–45)
HGB BLD-MCNC: 11.7 G/DL — SIGNIFICANT CHANGE UP (ref 11.5–15.5)
KETONES UR-MCNC: NEGATIVE — SIGNIFICANT CHANGE UP
LEUKOCYTE ESTERASE UR-ACNC: NEGATIVE — SIGNIFICANT CHANGE UP
MCHC RBC-ENTMCNC: 31.3 PG — SIGNIFICANT CHANGE UP (ref 27–34)
MCHC RBC-ENTMCNC: 32.1 % — SIGNIFICANT CHANGE UP (ref 32–36)
MCV RBC AUTO: 97.3 FL — SIGNIFICANT CHANGE UP (ref 80–100)
NITRITE UR-MCNC: NEGATIVE — SIGNIFICANT CHANGE UP
NRBC # FLD: 0 — SIGNIFICANT CHANGE UP
PH UR: 7.5 — SIGNIFICANT CHANGE UP (ref 5–8)
PLATELET # BLD AUTO: 246 K/UL — SIGNIFICANT CHANGE UP (ref 150–400)
PMV BLD: 10.5 FL — SIGNIFICANT CHANGE UP (ref 7–13)
POTASSIUM SERPL-MCNC: 4.4 MMOL/L — SIGNIFICANT CHANGE UP (ref 3.5–5.3)
POTASSIUM SERPL-SCNC: 4.4 MMOL/L — SIGNIFICANT CHANGE UP (ref 3.5–5.3)
PROT SERPL-MCNC: 7.6 G/DL — SIGNIFICANT CHANGE UP (ref 6–8.3)
PROT UR-MCNC: NEGATIVE — SIGNIFICANT CHANGE UP
RBC # BLD: 3.74 M/UL — LOW (ref 3.8–5.2)
RBC # FLD: 12.1 % — SIGNIFICANT CHANGE UP (ref 10.3–14.5)
SODIUM SERPL-SCNC: 142 MMOL/L — SIGNIFICANT CHANGE UP (ref 135–145)
SP GR SPEC: 1.01 — SIGNIFICANT CHANGE UP (ref 1–1.04)
UROBILINOGEN FLD QL: NORMAL — SIGNIFICANT CHANGE UP
WBC # BLD: 4.5 K/UL — SIGNIFICANT CHANGE UP (ref 3.8–10.5)
WBC # FLD AUTO: 4.5 K/UL — SIGNIFICANT CHANGE UP (ref 3.8–10.5)

## 2018-11-13 PROCEDURE — 93010 ELECTROCARDIOGRAM REPORT: CPT

## 2018-11-13 RX ORDER — MECLIZINE HCL 12.5 MG
0 TABLET ORAL
Qty: 0 | Refills: 0 | COMMUNITY

## 2018-11-13 RX ORDER — NORTRIPTYLINE HYDROCHLORIDE 10 MG/1
10 CAPSULE ORAL
Qty: 0 | Refills: 0 | COMMUNITY

## 2018-11-13 RX ORDER — CYCLOBENZAPRINE HYDROCHLORIDE 10 MG/1
0 TABLET, FILM COATED ORAL
Qty: 0 | Refills: 0 | COMMUNITY

## 2018-11-13 RX ORDER — CETIRIZINE HYDROCHLORIDE 10 MG/1
20 TABLET ORAL
Qty: 0 | Refills: 0 | COMMUNITY

## 2018-11-13 RX ORDER — MELOXICAM 15 MG/1
0 TABLET ORAL
Qty: 0 | Refills: 0 | COMMUNITY

## 2018-11-13 RX ORDER — CHOLECALCIFEROL (VITAMIN D3) 125 MCG
0 CAPSULE ORAL
Qty: 0 | Refills: 0 | COMMUNITY

## 2018-11-13 NOTE — H&P PST ADULT - NEGATIVE GENERAL GENITOURINARY SYMPTOMS
no renal colic/no urine discoloration/no hematuria/no flank pain L/no gas in urine/no nocturia/no urinary hesitancy/normal urinary frequency/no bladder infections

## 2018-11-13 NOTE — H&P PST ADULT - HISTORY OF PRESENT ILLNESS
54 y/o female   C/O left shoulder pain x3 years. S/P MRI of left shoulder (11/2018) 54 y/o  female with PMHx: Hypothyroidism, HTN, Hypothyroidism, Dyslipidemia, Fibromyalgia presents to PST for pre op evaluation with C/O left shoulder pain x3 years. S/P MRI of left shoulder (11/2018). Pre op diagnosis: complete rotator cuff tear or rupture of left shoulder. Now scheduled for left shoulder major debridement subacromial decompression/ acromioplasty, arthroscopy rotator cuff repair on 11/27/18

## 2018-11-13 NOTE — H&P PST ADULT - NEGATIVE GASTROINTESTINAL SYMPTOMS
no constipation/no abdominal pain/no hiccoughs/no melena/no vomiting/no jaundice/no change in bowel habits/no diarrhea/no nausea

## 2018-11-13 NOTE — H&P PST ADULT - NEGATIVE OPHTHALMOLOGIC SYMPTOMS
no diplopia/no pain L/no irritation R/no blurred vision R/no blurred vision L/no discharge R/no irritation L/no lacrimation L/no lacrimation R/no pain R/no loss of vision L/no loss of vision R/no discharge L/no photophobia

## 2018-11-13 NOTE — H&P PST ADULT - NEGATIVE ENMT SYMPTOMS
no throat pain/no dysphagia/no vertigo/no nasal discharge/no tinnitus/no nose bleeds/no recurrent cold sores/no sinus symptoms/no nasal congestion/no hearing difficulty/no ear pain/no nasal obstruction/no dry mouth

## 2018-11-13 NOTE — H&P PST ADULT - VISION (WITH CORRECTIVE LENSES IF THE PATIENT USUALLY WEARS THEM):
Normal vision: sees adequately in most situations; can see medication labels, newsprint/Wears RX Eyeglasses

## 2018-11-13 NOTE — H&P PST ADULT - VENOUS THROMBOEMBOLISM BMI
Spondylolysis and Spondylolisthesis: Exercises  Your Care Instructions  Here are some examples of typical rehabilitation exercises for your condition. Start each exercise slowly. Ease off the exercise if you start to have pain. Your doctor or physical therapist will tell you when you can start these exercises and which ones will work best for you. How to do the exercises  Single knee-to-chest    1. Lie on your back with your knees bent and your feet flat on the floor. You can put a small pillow under your head and neck if it is more comfortable. 2. Bring one knee to your chest, keeping the other foot flat on the floor. 3. Keep your lower back pressed to the floor. Hold for 15 to 30 seconds. 4. Relax, and lower the knee to the starting position. 5. Repeat with the other leg. Repeat 2 to 4 times with each leg. 6. To get more stretch, put your other leg flat on the floor while pulling your knee to your chest.  Double knee-to-chest    1. Lie on your back with your knees bent and your feet flat on the floor. You can put a small pillow under your head and neck if it is more comfortable. 2. Bring both knees to your chest.  3. Keep your lower back pressed to the floor. Hold for 15 to 30 seconds. 4. Relax, and lower your knees to the starting position. 5. Repeat 2 to 4 times. Alternate arm and leg (bird dog) exercise    Do this exercise slowly. Try to keep your body straight at all times. 1. Start on the floor, on your hands and knees. 2. Tighten your belly muscles by pulling your belly button in toward your spine. Be sure you continue to breathe normally and do not hold your breath. 3. Raise one arm off the floor, and hold it straight out in front of you. Be careful not to let your shoulder drop down, because that will twist your trunk. 4. Hold for about 6 seconds, then lower your arm and switch to your other arm. 5. Repeat 8 to 12 times on each arm.   6. When you can do this exercise with ease and no (1) obesity (BMI greater than 25) pain, repeat steps 1 through 5. But this time do it with one leg raised off the floor, holding your leg straight out behind you. Be careful not to let your hip drop down, because that will twist your trunk. 7. When holding your leg straight out becomes easier, try raising your opposite arm at the same time, and repeat steps 1 through 5. Bridging    1. Lie on your back with both knees bent. Your knees should be bent about 90 degrees. 2. Then push your feet into the floor, squeeze your buttocks, and lift your hips off the floor until your shoulders, hips, and knees are all in a straight line. 3. Hold for about 6 seconds as you continue to breathe normally, and then slowly lower your hips back down to the floor and rest for up to 10 seconds. 4. Repeat 8 to 12 times. Curl-ups    1. Lie on the floor on your back with your knees bent at a 90-degree angle. Your feet should be flat on the floor, about 12 inches from your buttocks. 2. Cross your arms over your chest. If this bothers your neck, try putting your hands behind your neck (not your head), with your elbows spread apart. 3. Slowly tighten your belly muscles and raise your shoulder blades off the floor. 4. Keep your head in line with your body, and do not press your chin to your chest.  5. Hold this position for 1 or 2 seconds, then slowly lower yourself back down to the floor. 6. Repeat 8 to 12 times. Plank    Do this exercise slowly. Try to keep your body straight at all times, and do not let one hip drop lower than the other. 1. Lie on your stomach, resting your upper body on your forearms. 2. Tighten your belly muscles by pulling your belly button in toward your spine. 3. Keeping your knees on the floor, press down with your forearms to lift your upper body off the floor. 4. Hold for about 6 seconds, then lower your body to the floor. Rest for up to 10 seconds. 5. Repeat 8 to 12 times.   6. Over time, work up to holding for 15 to 30 seconds each time.  7. If this exercise is easy to do with your knees on the floor, try doing this exercise with your knees and legs straight, supported by your toes on the floor. Follow-up care is a key part of your treatment and safety. Be sure to make and go to all appointments, and call your doctor if you are having problems. It's also a good idea to know your test results and keep a list of the medicines you take. Where can you learn more? Go to http://leon-edinson.info/. Enter [phone] in the search box to learn more about \"Spondylolysis and Spondylolisthesis: Exercises. \"  Current as of: March 21, 2017  Content Version: 11.4  © 0119-5214 [employer]. Care instructions adapted under license by [employer] (which disclaims liability or warranty for this information). If you have questions about a medical condition or this instruction, always ask your healthcare professional. [place] [address] any warranty or liability for your use of this information. Post-Operative Discharge Instructions after [facility]  [phone]       Spondylolysis and Spondylolisthesis: Exercises  Your Care Instructions  Here are some examples of typical rehabilitation exercises for your condition. Start each exercise slowly. Ease off the exercise if you start to have pain. Your doctor or physical therapist will tell you when you can start these exercises and which ones will work best for you. How to do the exercises  Single knee-to-chest    7. Lie on your back with your knees bent and your feet flat on the floor. You can put a small pillow under your head and neck if it is more comfortable. 8. Bring one knee to your chest, keeping the other foot flat on the floor. 9. Keep your lower back pressed to the floor. Hold for 15 to 30 seconds. 10. Relax, and lower the knee to the starting position. 11. Repeat with the other leg.  Repeat 2 to 4 times with each leg. 12. To get more stretch, put your other leg flat on the floor while pulling your knee to your chest.  Double knee-to-chest    6. Lie on your back with your knees bent and your feet flat on the floor. You can put a small pillow under your head and neck if it is more comfortable. 7. Bring both knees to your chest.  8. Keep your lower back pressed to the floor. Hold for 15 to 30 seconds. 9. Relax, and lower your knees to the starting position. 10. Repeat 2 to 4 times. Alternate arm and leg (bird dog) exercise    Do this exercise slowly. Try to keep your body straight at all times. 8. Start on the floor, on your hands and knees. 9. Tighten your belly muscles by pulling your belly button in toward your spine. Be sure you continue to breathe normally and do not hold your breath. 10. Raise one arm off the floor, and hold it straight out in front of you. Be careful not to let your shoulder drop down, because that will twist your trunk. 11. Hold for about 6 seconds, then lower your arm and switch to your other arm. 12. Repeat 8 to 12 times on each arm. 13. When you can do this exercise with ease and no pain, repeat steps 1 through 5. But this time do it with one leg raised off the floor, holding your leg straight out behind you. Be careful not to let your hip drop down, because that will twist your trunk. 14. When holding your leg straight out becomes easier, try raising your opposite arm at the same time, and repeat steps 1 through 5. Bridging    5. Lie on your back with both knees bent. Your knees should be bent about 90 degrees. 6. Then push your feet into the floor, squeeze your buttocks, and lift your hips off the floor until your shoulders, hips, and knees are all in a straight line. 7. Hold for about 6 seconds as you continue to breathe normally, and then slowly lower your hips back down to the floor and rest for up to 10 seconds. 8. Repeat 8 to 12 times. Curl-ups    7.  Lie on the floor on your back with your knees bent at a 90-degree angle. Your feet should be flat on the floor, about 12 inches from your buttocks. 8. Cross your arms over your chest. If this bothers your neck, try putting your hands behind your neck (not your head), with your elbows spread apart. 9. Slowly tighten your belly muscles and raise your shoulder blades off the floor. 10. Keep your head in line with your body, and do not press your chin to your chest.  11. Hold this position for 1 or 2 seconds, then slowly lower yourself back down to the floor. 12. Repeat 8 to 12 times. Plank    Do this exercise slowly. Try to keep your body straight at all times, and do not let one hip drop lower than the other. 8. Lie on your stomach, resting your upper body on your forearms. 9. Tighten your belly muscles by pulling your belly button in toward your spine. 10. Keeping your knees on the floor, press down with your forearms to lift your upper body off the floor. 11. Hold for about 6 seconds, then lower your body to the floor. Rest for up to 10 seconds. 12. Repeat 8 to 12 times. 13. Over time, work up to holding for 15 to 30 seconds each time. 14. If this exercise is easy to do with your knees on the floor, try doing this exercise with your knees and legs straight, supported by your toes on the floor. Follow-up care is a key part of your treatment and safety. Be sure to make and go to all appointments, and call your doctor if you are having problems. It's also a good idea to know your test results and keep a list of the medicines you take. Where can you learn more? Go to http://leon-edinson.info/. Enter [phone] in the search box to learn more about \"Spondylolysis and Spondylolisthesis: Exercises. \"  Current as of: March 21, 2017  Content Version: 11.4  © 1876-6024 [employer].  Care instructions adapted under license by [employer] (which disclaims liability or warranty for this information). If you have questions about a medical condition or this instruction, always ask your healthcare professional. [address] any warranty or liability for your use of this information. Patient armband removed and shredded       Spondylolysis and Spondylolisthesis: Exercises  Your Care Instructions  Here are some examples of typical rehabilitation exercises for your condition. Start each exercise slowly. Ease off the exercise if you start to have pain. Your doctor or physical therapist will tell you when you can start these exercises and which ones will work best for you. How to do the exercises  Single knee-to-chest    13. Lie on your back with your knees bent and your feet flat on the floor. You can put a small pillow under your head and neck if it is more comfortable. 14. Bring one knee to your chest, keeping the other foot flat on the floor. 15. Keep your lower back pressed to the floor. Hold for 15 to 30 seconds. 16. Relax, and lower the knee to the starting position. 17. Repeat with the other leg. Repeat 2 to 4 times with each leg. 18. To get more stretch, put your other leg flat on the floor while pulling your knee to your chest.  Double knee-to-chest    11. Lie on your back with your knees bent and your feet flat on the floor. You can put a small pillow under your head and neck if it is more comfortable. 12. Bring both knees to your chest.  13. Keep your lower back pressed to the floor. Hold for 15 to 30 seconds. 14. Relax, and lower your knees to the starting position. 15. Repeat 2 to 4 times. Alternate arm and leg (bird dog) exercise    Do this exercise slowly. Try to keep your body straight at all times. 15. Start on the floor, on your hands and knees. 12. Tighten your belly muscles by pulling your belly button in toward your spine. Be sure you continue to breathe normally and do not hold your breath.   17. Raise one arm off the floor, and hold it straight out in front of you. Be careful not to let your shoulder drop down, because that will twist your trunk. 18. Hold for about 6 seconds, then lower your arm and switch to your other arm. 19. Repeat 8 to 12 times on each arm. 20. When you can do this exercise with ease and no pain, repeat steps 1 through 5. But this time do it with one leg raised off the floor, holding your leg straight out behind you. Be careful not to let your hip drop down, because that will twist your trunk. 21. When holding your leg straight out becomes easier, try raising your opposite arm at the same time, and repeat steps 1 through 5. Bridging    9. Lie on your back with both knees bent. Your knees should be bent about 90 degrees. 10. Then push your feet into the floor, squeeze your buttocks, and lift your hips off the floor until your shoulders, hips, and knees are all in a straight line. 11. Hold for about 6 seconds as you continue to breathe normally, and then slowly lower your hips back down to the floor and rest for up to 10 seconds. 12. Repeat 8 to 12 times. Curl-ups    13. Lie on the floor on your back with your knees bent at a 90-degree angle. Your feet should be flat on the floor, about 12 inches from your buttocks. 15. Cross your arms over your chest. If this bothers your neck, try putting your hands behind your neck (not your head), with your elbows spread apart. 15. Slowly tighten your belly muscles and raise your shoulder blades off the floor. 16. Keep your head in line with your body, and do not press your chin to your chest.  17. Hold this position for 1 or 2 seconds, then slowly lower yourself back down to the floor. 18. Repeat 8 to 12 times. Plank    Do this exercise slowly. Try to keep your body straight at all times, and do not let one hip drop lower than the other. 13. Lie on your stomach, resting your upper body on your forearms.   12. Tighten your belly muscles by pulling your belly button in toward your spine.  17. Keeping your knees on the floor, press down with your forearms to lift your upper body off the floor. 18. Hold for about 6 seconds, then lower your body to the floor. Rest for up to 10 seconds. 19. Repeat 8 to 12 times. 20. Over time, work up to holding for 15 to 30 seconds each time. 21. If this exercise is easy to do with your knees on the floor, try doing this exercise with your knees and legs straight, supported by your toes on the floor. Follow-up care is a key part of your treatment and safety. Be sure to make and go to all appointments, and call your doctor if you are having problems. It's also a good idea to know your test results and keep a list of the medicines you take. Where can you learn more? Go to http://leon-edinson.info/. Enter [phone] in the search box to learn more about \"Spondylolysis and Spondylolisthesis: Exercises. \"  Current as of: March 21, 2017  Content Version: 11.4  © 5127-8438 [employer]. Care instructions adapted under license by [employer] (which disclaims liability or warranty for this information). If you have questions about a medical condition or this instruction, always ask your healthcare professional. [address] any warranty or liability for your use of this information. DISCHARGE SUMMARY from Nurse    PATIENT INSTRUCTIONS:    After general anesthesia or intravenous sedation, for 24 hours or while taking prescription Narcotics:  · Limit your activities  · Do not drive and operate hazardous machinery  · Do not make important personal or business decisions  · Do  not drink alcoholic beverages  · If you have not urinated within 8 hours after discharge, please contact your surgeon on call.     Report the following to your surgeon:  · Excessive pain, swelling, redness or odor of or around the surgical area  · Temperature over 100.5  · Nausea and vomiting lasting longer than 4 hours or if unable to take medications  · Any signs of decreased circulation or nerve impairment to extremity: change in color, persistent  numbness, tingling, coldness or increase pain  · Any questions    What to do at Home:  Recommended activity: Activity as tolerated. If you experience any of the following symptoms temperature greater than 100.5,persistent pain,odor or drainage from surgical site. *  Please give a list of your current medications to your Primary Care Provider. *  Please update this list whenever your medications are discontinued, doses are      changed, or new medications (including over-the-counter products) are added. *  Please carry medication information at all times in case of emergency situations. These are general instructions for a healthy lifestyle:    No smoking/ No tobacco products/ Avoid exposure to second hand smoke  Surgeon General's Warning:  Quitting smoking now greatly reduces serious risk to your health. Obesity, smoking, and sedentary lifestyle greatly increases your risk for illness    A healthy diet, regular physical exercise & weight monitoring are important for maintaining a healthy lifestyle    You may be retaining fluid if you have a history of heart failure or if you experience any of the following symptoms:  Weight gain of 3 pounds or more overnight or 5 pounds in a week, increased swelling in our hands or feet or shortness of breath while lying flat in bed. Please call your doctor as soon as you notice any of these symptoms; do not wait until your next office visit. Recognize signs and symptoms of STROKE:    F-face looks uneven    A-arms unable to move or move unevenly    S-speech slurred or non-existent    T-time-call 911 as soon as signs and symptoms begin-DO NOT go       Back to bed or wait to see if you get better-TIME IS BRAIN. Warning Signs of HEART ATTACK     Call 911 if you have these symptoms:   Chest discomfort.  Most heart attacks involve discomfort in the center of the chest that lasts more than a few minutes, or that goes away and comes back. It can feel like uncomfortable pressure, squeezing, fullness, or pain.  Discomfort in other areas of the upper body. Symptoms can include pain or discomfort in one or both arms, the back, neck, jaw, or stomach.  Shortness of breath with or without chest discomfort.  Other signs may include breaking out in a cold sweat, nausea, or lightheadedness. Don't wait more than five minutes to call 911 - MINUTES MATTER! Fast action can save your life. Calling 911 is almost always the fastest way to get lifesaving treatment. Emergency Medical Services staff can begin treatment when they arrive -- up to an hour sooner than if someone gets to the hospital by car. The discharge information has been reviewed with the patient. The patient verbalized understanding. Discharge medications reviewed with the patient and appropriate educational materials and side effects teaching were provided. ___________________________________________________________________________________________________________________________________    At your 2-week post-operative visit we will remove any skin staples or sutures, if present. (Appointment was made at the time you scheduled your surgery)    Call office or physician on-call for any of the following:  · Fever greater than 100.5  · Uncontrollable chills  · Drainage from incision  · Pain not controlled by pain medication  · New pain  · New weakness or progressive weakness  · Food sticking in throat or excessive coughing when swallowing    Stop all anti-inflammatories (arthritis medication) such as Ibuprofen, Motrin, Aleve, Voltaren, Relafen, Naproxen, Arthrotec, etc. for 12 weeks ONLY if you had a neck or back Fusion. You may take Tylenol or acetaminophen over the counter. May remove EZ stockings when discharged from the hospital.    May shower on the third day after surgery.  Leave dressing on while you shower; the dressing is waterproof as long as the edges are sealed. Change dressing after 1 week, or sooner if saturated with drainage. Replace with a gauze dressing. Abstain from driving until your first post-operative visit. If you must drive, do not take pain medications that may alter your abilities. Lumbar braces and neck collars are for comfort only. Walking is the best therapy after back surgery. Avoid extremes of bending, twisting, or lifting. All post-operative surgical patients should function within the range of the pain. \"If it hurts - do not do it. \"        Patient armband removed and shredded      DISCHARGE SUMMARY from Nurse    PATIENT INSTRUCTIONS:    After general anesthesia or intravenous sedation, for 24 hours or while taking prescription Narcotics:  · Limit your activities  · Do not drive and operate hazardous machinery  · Do not make important personal or business decisions  · Do  not drink alcoholic beverages  · If you have not urinated within 8 hours after discharge, please contact your surgeon on call. Report the following to your surgeon:  · Excessive pain, swelling, redness or odor of or around the surgical area  · Temperature over 100.5  · Nausea and vomiting lasting longer than 4 hours or if unable to take medications  · Any signs of decreased circulation or nerve impairment to extremity: change in color, persistent  numbness, tingling, coldness or increase pain  · Any questions    What to do at Home:  Recommended activity: Activity as tolerated within restrictions detailed by provider    If you experience any of the following symptoms Nausea, vomiting, diarrhea, fever greater than 100.5, dizziness, severe headache, shortness of breath, chest pain, increased pain, please follow up with PCP. *  Please give a list of your current medications to your Primary Care Provider.     *  Please update this list whenever your medications are discontinued, doses are changed, or new medications (including over-the-counter products) are added. *  Please carry medication information at all times in case of emergency situations. These are general instructions for a healthy lifestyle:    No smoking/ No tobacco products/ Avoid exposure to second hand smoke  Surgeon General's Warning:  Quitting smoking now greatly reduces serious risk to your health. Obesity, smoking, and sedentary lifestyle greatly increases your risk for illness    A healthy diet, regular physical exercise & weight monitoring are important for maintaining a healthy lifestyle    You may be retaining fluid if you have a history of heart failure or if you experience any of the following symptoms:  Weight gain of 3 pounds or more overnight or 5 pounds in a week, increased swelling in our hands or feet or shortness of breath while lying flat in bed. Please call your doctor as soon as you notice any of these symptoms; do not wait until your next office visit. Recognize signs and symptoms of STROKE:    F-face looks uneven    A-arms unable to move or move unevenly    S-speech slurred or non-existent    T-time-call 911 as soon as signs and symptoms begin-DO NOT go       Back to bed or wait to see if you get better-TIME IS BRAIN. Warning Signs of HEART ATTACK     Call 911 if you have these symptoms:   Chest discomfort. Most heart attacks involve discomfort in the center of the chest that lasts more than a few minutes, or that goes away and comes back. It can feel like uncomfortable pressure, squeezing, fullness, or pain.  Discomfort in other areas of the upper body. Symptoms can include pain or discomfort in one or both arms, the back, neck, jaw, or stomach.  Shortness of breath with or without chest discomfort.  Other signs may include breaking out in a cold sweat, nausea, or lightheadedness. Don't wait more than five minutes to call 911 - MINUTES MATTER! Fast action can save your life.  Calling 911 is almost always the fastest way to get lifesaving treatment. Emergency Medical Services staff can begin treatment when they arrive -- up to an hour sooner than if someone gets to the hospital by car. The discharge information has been reviewed with the patient. The patient verbalized understanding. Discharge medications reviewed with the patient and appropriate educational materials and side effects teaching were provided.   ___________________________________________________________________________________________________________________________________

## 2018-11-13 NOTE — H&P PST ADULT - MUSCULOSKELETAL
details… detailed exam no joint erythema/decreased ROM/left shoulder, left hand/no joint warmth/diminished strength decreased ROM due to pain/no joint warmth/no calf tenderness/no joint swelling/no joint erythema/left shoulder, left hand/diminished strength

## 2018-11-13 NOTE — H&P PST ADULT - GASTROINTESTINAL DETAILS
no rigidity/no guarding/no rebound tenderness/no masses palpable/bowel sounds normal/soft/no distention/nontender

## 2018-11-13 NOTE — H&P PST ADULT - PMH
Asthma with allergic rhinitis    Asthma, stable    Carpal tunnel syndrome, left upper limb    Carpal tunnel syndrome, right upper limb    Depression  controlled with meds  Difficulty urinating  laura for last 1 month  Dyslipidemia    Fibroids  uterine  Fibromyalgia    Foot fracture, right  s/p fall on 9/3/2014, no surgery recommended,, foot brace maintained f/u with Dr. Ld Jay (orthopedic)  Gastroparesis    Hypothyroid    Left knee pain    Ovarian cyst  left  Prediabetes    Stress incontinence in female    Thyroid nodule  s/p biopsy (benign)  Trigger finger, left middle finger    Trigger finger, right middle finger    Type 2 diabetes mellitus Asthma with allergic rhinitis    Asthma, stable    Carpal tunnel syndrome, left upper limb    Carpal tunnel syndrome, right upper limb    Depression  controlled with meds  Dyslipidemia    Fibroids  uterine  Fibromyalgia    Foot fracture, right  s/p fall on 9/3/2014, no surgery recommended,, foot brace maintained f/u with Dr. Ld Jay (orthopedic)  Gastroparesis    Hypothyroid    Left knee pain    Ovarian cyst  left  Prediabetes    Stress incontinence in female    Thyroid nodule  s/p biopsy (benign)  Trigger finger, left middle finger    Trigger finger, right middle finger    Type 2 diabetes mellitus

## 2018-11-13 NOTE — H&P PST ADULT - PSH
Female stress incontinence  sling procedure   H/O abdominal hysterectomy    H/O arthroscopic knee surgery  left  H/O bilateral inguinal hernia repair    H/O carpal tunnel repair  RIGHT Hand 2017  H/O cervical polypectomy    H/O colonoscopy with polypectomy  benign  H/O colposcopy with cervical biopsy    H/O tubal ligation    H/O:     Left wrist fracture  10 years ago with repair and placement of pins  Other functional disorder of bladder  bladder suspension for incontience  S/P carpal tunnel release  left; and left middle trigger finger release; 17  S/P trigger finger release  RIGHT Middle Finger 2017

## 2018-11-13 NOTE — H&P PST ADULT - RS GEN PE MLT RESP DETAILS PC
no rhonchi/no chest wall tenderness/good air movement/no intercostal retractions/no wheezes/no rales/breath sounds equal/respirations non-labored/clear to auscultation bilaterally/airway patent

## 2018-11-13 NOTE — H&P PST ADULT - PROBLEM SELECTOR PLAN 1
Scheduled for left shoulder major debridement, subacromial decompression/ acromioplasty, arthroscopy rotator cuff repair on 11/27/18. Pre op instructions, famotidine, chlorhexidine gluconate soap given and explained. Pt verbalized  understanding.

## 2018-11-14 LAB — SPECIMEN SOURCE: SIGNIFICANT CHANGE UP

## 2018-11-15 LAB
-  AMIKACIN: SIGNIFICANT CHANGE UP
-  AMPICILLIN/SULBACTAM: SIGNIFICANT CHANGE UP
-  AMPICILLIN: SIGNIFICANT CHANGE UP
-  AZTREONAM: SIGNIFICANT CHANGE UP
-  CEFAZOLIN: SIGNIFICANT CHANGE UP
-  CEFEPIME: SIGNIFICANT CHANGE UP
-  CEFOXITIN: SIGNIFICANT CHANGE UP
-  CEFTAZIDIME: SIGNIFICANT CHANGE UP
-  CEFTRIAXONE: SIGNIFICANT CHANGE UP
-  CIPROFLOXACIN: SIGNIFICANT CHANGE UP
-  ERTAPENEM: SIGNIFICANT CHANGE UP
-  GENTAMICIN: SIGNIFICANT CHANGE UP
-  IMIPENEM: SIGNIFICANT CHANGE UP
-  LEVOFLOXACIN: SIGNIFICANT CHANGE UP
-  MEROPENEM: SIGNIFICANT CHANGE UP
-  NITROFURANTOIN: SIGNIFICANT CHANGE UP
-  PIPERACILLIN/TAZOBACTAM: SIGNIFICANT CHANGE UP
-  TIGECYCLINE: SIGNIFICANT CHANGE UP
-  TOBRAMYCIN: SIGNIFICANT CHANGE UP
-  TRIMETHOPRIM/SULFAMETHOXAZOLE: SIGNIFICANT CHANGE UP
BACTERIA UR CULT: SIGNIFICANT CHANGE UP
METHOD TYPE: SIGNIFICANT CHANGE UP
ORGANISM # SPEC MICROSCOPIC CNT: SIGNIFICANT CHANGE UP
ORGANISM # SPEC MICROSCOPIC CNT: SIGNIFICANT CHANGE UP

## 2018-11-21 RX ORDER — CIPROFLOXACIN HYDROCHLORIDE 250 MG/1
250 TABLET, FILM COATED ORAL
Qty: 6 | Refills: 0 | Status: ACTIVE | COMMUNITY
Start: 2018-11-21 | End: 1900-01-01

## 2018-11-26 ENCOUNTER — TRANSCRIPTION ENCOUNTER (OUTPATIENT)
Age: 53
End: 2018-11-26

## 2018-11-26 RX ORDER — OXYCODONE AND ACETAMINOPHEN 5; 325 MG/1; MG/1
5-325 TABLET ORAL
Qty: 70 | Refills: 0 | Status: ACTIVE | COMMUNITY
Start: 2018-11-26 | End: 1900-01-01

## 2018-11-27 ENCOUNTER — APPOINTMENT (OUTPATIENT)
Dept: ORTHOPEDIC SURGERY | Facility: AMBULATORY SURGERY CENTER | Age: 53
End: 2018-11-27

## 2018-11-27 ENCOUNTER — OUTPATIENT (OUTPATIENT)
Dept: OUTPATIENT SERVICES | Facility: HOSPITAL | Age: 53
LOS: 1 days | Discharge: HOME | End: 2018-11-27
Payer: COMMERCIAL

## 2018-11-27 VITALS
OXYGEN SATURATION: 100 % | SYSTOLIC BLOOD PRESSURE: 112 MMHG | HEART RATE: 70 BPM | TEMPERATURE: 98 F | RESPIRATION RATE: 14 BRPM | DIASTOLIC BLOOD PRESSURE: 70 MMHG

## 2018-11-27 VITALS
HEIGHT: 61 IN | OXYGEN SATURATION: 100 % | SYSTOLIC BLOOD PRESSURE: 128 MMHG | TEMPERATURE: 98 F | HEART RATE: 79 BPM | RESPIRATION RATE: 18 BRPM | WEIGHT: 126.1 LBS | DIASTOLIC BLOOD PRESSURE: 55 MMHG

## 2018-11-27 DIAGNOSIS — Z98.89 OTHER SPECIFIED POSTPROCEDURAL STATES: Chronic | ICD-10-CM

## 2018-11-27 DIAGNOSIS — S62.102A FRACTURE OF UNSPECIFIED CARPAL BONE, LEFT WRIST, INITIAL ENCOUNTER FOR CLOSED FRACTURE: Chronic | ICD-10-CM

## 2018-11-27 DIAGNOSIS — Z98.890 OTHER SPECIFIED POSTPROCEDURAL STATES: Chronic | ICD-10-CM

## 2018-11-27 DIAGNOSIS — N39.3 STRESS INCONTINENCE (FEMALE) (MALE): Chronic | ICD-10-CM

## 2018-11-27 DIAGNOSIS — M75.42 IMPINGEMENT SYNDROME OF LEFT SHOULDER: ICD-10-CM

## 2018-11-27 DIAGNOSIS — N31.8 OTHER NEUROMUSCULAR DYSFUNCTION OF BLADDER: Chronic | ICD-10-CM

## 2018-11-27 DIAGNOSIS — Z90.710 ACQUIRED ABSENCE OF BOTH CERVIX AND UTERUS: Chronic | ICD-10-CM

## 2018-11-27 DIAGNOSIS — Z98.51 TUBAL LIGATION STATUS: Chronic | ICD-10-CM

## 2018-11-27 PROCEDURE — 29826 SHO ARTHRS SRG DECOMPRESSION: CPT | Mod: LT

## 2018-11-27 PROCEDURE — 29827 SHO ARTHRS SRG RT8TR CUF RPR: CPT | Mod: LT

## 2018-11-27 PROCEDURE — 29823 SHO ARTHRS SRG XTNSV DBRDMT: CPT | Mod: LT

## 2018-11-27 NOTE — ASU DISCHARGE PLAN (ADULT/PEDIATRIC). - NOTIFY
Pain not relieved by Medications/Bleeding that does not stop/Fever greater than 101 Unable to Urinate/Fever greater than 101/Pain not relieved by Medications/Numbness, color, or temperature change to extremity/Bleeding that does not stop/Swelling that continues/Persistent Nausea and Vomiting

## 2018-11-27 NOTE — ASU DISCHARGE PLAN (ADULT/PEDIATRIC). - MEDICATION SUMMARY - MEDICATIONS TO TAKE
I will START or STAY ON the medications listed below when I get home from the hospital:    [place] 3  -- 1 tab(s) by mouth once a day. last dsoe 11/13/2018  -- Indication: For Supplement    Calcium  -- 1 tab(s) by mouth once a day  -- Indication: For Supplement    Iron  -- 1 tab(s) by mouth once a day  -- Indication: For Supplement    oxyCODONE-acetaminophen 5 mg-325 mg oral tablet  -- 1 tab(s) by mouth every 4 to 6 hours MDD:6 per day  -- Caution federal law prohibits the transfer of this drug to any person other  than the person for whom it was prescribed.  May cause drowsiness.  Alcohol may intensify this effect.  Use care when operating dangerous machinery.  This prescription cannot be refilled.  This product contains acetaminophen.  Do not use  with any other product containing acetaminophen to prevent possible liver damage.  Using more of this medication than prescribed may cause serious breathing problems.    -- Indication: For Pain    gabapentin 300 mg oral capsule  -- 2 cap(s) by mouth every morning  -- Indication: For Pain    gabapentin 300 mg oral capsule  -- 3 cap(s) by mouth daily at 3 pm  -- Indication: For Pain    nortriptyline 25 mg oral capsule  -- 1 cap(s) by mouth 3 times a day  -- Indication: For Mood    metFORMIN 500 mg oral tablet  -- 1 tab(s) by mouth 2 times a day  -- Indication: For DM    atorvastatin 10 mg oral tablet  -- orally once a day (at bedtime)  -- Indication: For HLD    ALPRAZolam 0.5 mg oral tablet  -- 1 tab(s) by mouth 2 times a day  -- Indication: For Anxiety    Metoprolol Succinate ER 25 mg oral tablet, extended release  -- 1 tab(s) by mouth once a day  -- Indication: For HTN    ProAir RespiClick 90 mcg/inh inhalation powder  -- 2 puff(s) inhaled every 4 hours, As Needed  -- Indication: For Asthma    Turmeric  -- 1 tab(s) by mouth once a day. last dose 11/13/2018  -- Indication: For Supplement    cyclobenzaprine 10 mg oral tablet  -- every night   -- Indication: For Muscle Relaxant    Flonase 50 mcg/inh nasal spray  -- 1 spray(s) into nose once a day  -- Indication: For Nasal Spray    Zegerid OTC 20 mg-1100 mg oral capsule  -- 1 cap(s) by mouth once a day  -- Indication: For GERD    Synthroid 75 mcg (0.075 mg) oral tablet  -- 1 tab(s) by mouth once a day  -- Indication: For Hypothyroidism    multivitamin  -- 1 tab(s) by mouth once a day. last dose 11/13/2018  -- Indication: For Supplement    Vitamin D3  -- 1 tab(s) by mouth once a day  -- Indication: For Supplement    Vitamin C  -- 1 tab(s) by mouth once a day  -- Indication: For Supplement

## 2018-11-27 NOTE — ASU DISCHARGE PLAN (ADULT/PEDIATRIC). - ITEMS TO FOLLOWUP WITH YOUR PHYSICIAN'S
Please follow up with Dr. Tony within 1-2 weeks. You may call [phone] to schedule an appointment.    Please follow instruction sheet. Please do not participate in heavy lifting or strenuous exercise. Do not drive while taking pain medication.    Please go to the emergency department if you experience pain not controlled by pain medication, bleeding that will not stop, fevers or chills.

## 2018-11-27 NOTE — ASU DISCHARGE PLAN (ADULT/PEDIATRIC). - FOLLOWUP APPOINTMENT CLINIC/PHYSICIAN
Please follow up with Dr. Tony within 1-2 weeks. You may call [phone] to schedule an appointment.

## 2018-11-27 NOTE — ASU DISCHARGE PLAN (ADULT/PEDIATRIC). - ASU FOLLOWUP
911 or go to the nearest Emergency Room [facility] ([facility]):

## 2018-11-29 RX ORDER — OXYBUTYNIN CHLORIDE 5 MG/1
5 TABLET, EXTENDED RELEASE ORAL
Qty: 30 | Refills: 3 | Status: ACTIVE | COMMUNITY
Start: 2018-10-29 | End: 1900-01-01

## 2018-12-10 ENCOUNTER — APPOINTMENT (OUTPATIENT)
Dept: ORTHOPEDIC SURGERY | Facility: CLINIC | Age: 53
End: 2018-12-10
Payer: COMMERCIAL

## 2018-12-10 PROCEDURE — 99024 POSTOP FOLLOW-UP VISIT: CPT

## 2018-12-11 ENCOUNTER — RX RENEWAL (OUTPATIENT)
Age: 53
End: 2018-12-11

## 2018-12-19 ENCOUNTER — RX RENEWAL (OUTPATIENT)
Age: 53
End: 2018-12-19

## 2019-01-09 ENCOUNTER — APPOINTMENT (OUTPATIENT)
Dept: ORTHOPEDIC SURGERY | Facility: CLINIC | Age: 54
End: 2019-01-09
Payer: COMMERCIAL

## 2019-01-09 PROCEDURE — 99024 POSTOP FOLLOW-UP VISIT: CPT

## 2019-01-11 NOTE — HISTORY OF PRESENT ILLNESS
[Clean/Dry/Intact] : clean, dry and intact [Healed] : healed [Neuro Intact] : an unremarkable neurological exam [Vascular Intact] : ~T peripheral vascular exam normal [Doing Well] : is doing well [Excellent Pain Control] : has excellent pain control [No Sign of Infection] : is showing no signs of infection [8] : the patient reports pain that is 8/10 in severity [Chills] : no chills [Fever] : no fever [Nausea] : no nausea [Vomiting] : no vomiting [Erythema] : not erythematous [Discharge] : absent of discharge [Swelling] : not swollen [Dehiscence] : not dehisced [de-identified] : 53-year-old female status post left shoulder arthroscopy, SAD, RTCR 11/27/18 [de-identified] : 53-year-old female status post left shoulder arthroscopy, SAD, RTCR. She is doing well. Attending PT 3 x per week. She is taking Tylenol PM and needed.  Denies post op complication.  [de-identified] : Right shoulder exam\par \par Inspection: No swelling\par Skin: Incisions C/D/I, no drainage, healed\par AROM: , ABD 80, ER 40, IR to low lumbar\par Painful arc ROM: Any further PROM\par Tenderness: Tenderness throughout the shoulder girdle\par Strength: 4+/5 IR, 4/5 ER, 3/5 SS\par Vasc: 2+ radial pulse\par Neuro: AIN, PIN, Ulnar nerve in tact to motor\par Sensation: In tact to light touch throughout\par  [de-identified] : 53-year-old female status post left shoulder arthroscopy, SAD, RTCR\par \par Patient is doing well status post left shoulder arthroscopy with rotator cuff repair. Pain is improving, patient reports significant improvement from preoperative symptoms. Patient continues to have pain, but it is manageable. Patient is currently happy with her postoperative function.\par \par Recommendation: Continue PT. Ice/NSAIDS. Home exercise program.\par \par Followup 6wks

## 2019-03-07 ENCOUNTER — APPOINTMENT (OUTPATIENT)
Dept: ORTHOPEDIC SURGERY | Facility: CLINIC | Age: 54
End: 2019-03-07
Payer: COMMERCIAL

## 2019-03-07 PROCEDURE — 99213 OFFICE O/P EST LOW 20 MIN: CPT

## 2019-03-07 NOTE — PHYSICAL EXAM
[de-identified] : Left shoulder exam\par \par Inspection: No swelling\par Skin: Incisions C/D/I, no drainage, healed\par AROM: , ABD 90, ER 70, IR to upper lumbar\par Painful arc ROM: Minimal with terminal ROM\par Tenderness: Minimal\par Strength: 4+/5 IR, 4+/5 ER, 4/5 SS\par Vasc: 2+ radial pulse\par Neuro: AIN, PIN, Ulnar nerve in tact to motor\par Sensation: In tact to light touch throughout\par \par Right shoulder exam\par \par Inspection: No malalignment, No defects, No atrophy\par Skin: No masses, No lesions\par Neck: Negative Spurlings, full ROM, no pain with ROM\par AROM: FF to 170, abduction to 90, ER to 70, IR to mid lumbar\par PROM: FF to 180, abduction to 90, ER to 70\par Painful arc ROM: Pain with active/passive forward flexion\par Tenderness: Positive bicipital tenderness, positive tenderness to the greater tuberosity/RTC insertion, positive anterior shoulder/lesser tuberosity tenderness\par Strength: 4+/5 ER, 5/5 IR in adduction, 4-/5 supraspinatus testing, positive O'Briens test\par AC Joint: Mild ttp/pain with cross arm testing\par Biceps: Speed Negative, Yergusons Negative\par Impingement test: Positive Casey, Positive Neer \par Stability: Stable\par Vasc: 2+ radial pulse\par Neuro: AIN, PIN, Ulnar nerve in tact to motor\par Sensation: In tact to light touch throughout [de-identified] : MRI right shoulder dated 2.27.17 reviewed that shows full-thickness supraspinatus tear. Overlying impingement. Bursitis.

## 2019-03-07 NOTE — PHYSICAL EXAM
[de-identified] : Left shoulder exam\par \par Inspection: No swelling\par Skin: Incisions C/D/I, no drainage, healed\par AROM: , ABD 90, ER 70, IR to upper lumbar\par Painful arc ROM: Minimal with terminal ROM\par Tenderness: Minimal\par Strength: 4+/5 IR, 4+/5 ER, 4/5 SS\par Vasc: 2+ radial pulse\par Neuro: AIN, PIN, Ulnar nerve in tact to motor\par Sensation: In tact to light touch throughout\par \par Right shoulder exam\par \par Inspection: No malalignment, No defects, No atrophy\par Skin: No masses, No lesions\par Neck: Negative Spurlings, full ROM, no pain with ROM\par AROM: FF to 170, abduction to 90, ER to 70, IR to mid lumbar\par PROM: FF to 180, abduction to 90, ER to 70\par Painful arc ROM: Pain with active/passive forward flexion\par Tenderness: Positive bicipital tenderness, positive tenderness to the greater tuberosity/RTC insertion, positive anterior shoulder/lesser tuberosity tenderness\par Strength: 4+/5 ER, 5/5 IR in adduction, 4-/5 supraspinatus testing, positive O'Briens test\par AC Joint: Mild ttp/pain with cross arm testing\par Biceps: Speed Negative, Yergusons Negative\par Impingement test: Positive Casey, Positive Neer \par Stability: Stable\par Vasc: 2+ radial pulse\par Neuro: AIN, PIN, Ulnar nerve in tact to motor\par Sensation: In tact to light touch throughout [de-identified] : MRI right shoulder dated 2.27.17 reviewed that shows full-thickness supraspinatus tear. Overlying impingement. Bursitis.

## 2019-03-07 NOTE — DISCUSSION/SUMMARY
[de-identified] : 54 year-old female status post left shoulder arthroscopy, SAD, RTCR, persistent right shoulder pain\par \par Patient is doing well status post left shoulder arthroscopy with rotator cuff repair. Pain is improving, patient reports significant improvement from preoperative symptoms with improvement of range of motion and function. Currently has near full terminal motion with minimal pain, improved symptoms, as well as some worsening symptoms on the right shoulder. Patient has already exceeded expectations regarding function postop given preoperative medical comorbidities/diagnoses.\par \par Patient has questions regarding her right shoulder and rotator cuff injury. Patient has a known full thickness rotator cuff tear, and is entertaining additional treatment. Would recommend additional MRI imaging prior to any further intervention to stratify her for additional surgery to the right side.\par \par Recommendation: Continue PT if able. Ice/NSAIDS. Home exercise program.\par \par Followup after right shoulder MRI for consideration arthroscopy.

## 2019-03-07 NOTE — HISTORY OF PRESENT ILLNESS
[de-identified] : 54-year-old female status post left shoulder arthroscopy, SAD, RTCR 11/27/18. She is doing well. She stopped PT last month because she is not able to pay for it. She has been doing exercises on her own. She is not taking pain medication. Now C/O more right shoulder pain and would like to discuss possible surgery for right shoulder. Denies any post op complication, and has done well despite preoperative concerns given global MS pains/fibromyalgia. Pain is 3/10 in severity. R>L pain at this time. No significant restriction left shoulder.

## 2019-03-07 NOTE — HISTORY OF PRESENT ILLNESS
[de-identified] : 54-year-old female status post left shoulder arthroscopy, SAD, RTCR 11/27/18. She is doing well. She stopped PT last month because she is not able to pay for it. She has been doing exercises on her own. She is not taking pain medication. Now C/O more right shoulder pain and would like to discuss possible surgery for right shoulder. Denies any post op complication, and has done well despite preoperative concerns given global MS pains/fibromyalgia. Pain is 3/10 in severity. R>L pain at this time. No significant restriction left shoulder.

## 2019-03-07 NOTE — DISCUSSION/SUMMARY
[de-identified] : 54 year-old female status post left shoulder arthroscopy, SAD, RTCR, persistent right shoulder pain\par \par Patient is doing well status post left shoulder arthroscopy with rotator cuff repair. Pain is improving, patient reports significant improvement from preoperative symptoms with improvement of range of motion and function. Currently has near full terminal motion with minimal pain, improved symptoms, as well as some worsening symptoms on the right shoulder. Patient has already exceeded expectations regarding function postop given preoperative medical comorbidities/diagnoses.\par \par Patient has questions regarding her right shoulder and rotator cuff injury. Patient has a known full thickness rotator cuff tear, and is entertaining additional treatment. Would recommend additional MRI imaging prior to any further intervention to stratify her for additional surgery to the right side.\par \par Recommendation: Continue PT if able. Ice/NSAIDS. Home exercise program.\par \par Followup after right shoulder MRI for consideration arthroscopy.

## 2019-03-11 ENCOUNTER — OTHER (OUTPATIENT)
Age: 54
End: 2019-03-11

## 2019-03-12 ENCOUNTER — TRANSCRIPTION ENCOUNTER (OUTPATIENT)
Age: 54
End: 2019-03-12

## 2019-03-19 ENCOUNTER — OTHER (OUTPATIENT)
Age: 54
End: 2019-03-19

## 2019-03-25 ENCOUNTER — APPOINTMENT (OUTPATIENT)
Dept: ORTHOPEDIC SURGERY | Facility: CLINIC | Age: 54
End: 2019-03-25
Payer: COMMERCIAL

## 2019-03-25 VITALS
SYSTOLIC BLOOD PRESSURE: 117 MMHG | HEIGHT: 63 IN | BODY MASS INDEX: 22.68 KG/M2 | HEART RATE: 86 BPM | DIASTOLIC BLOOD PRESSURE: 76 MMHG | WEIGHT: 128 LBS

## 2019-03-25 PROCEDURE — 99214 OFFICE O/P EST MOD 30 MIN: CPT

## 2019-03-25 RX ORDER — MELOXICAM 15 MG/1
15 TABLET ORAL
Qty: 30 | Refills: 1 | Status: ACTIVE | COMMUNITY
Start: 2019-03-25 | End: 1900-01-01

## 2019-03-29 NOTE — HISTORY OF PRESENT ILLNESS
[de-identified] : 54 year old female status post left shoulder arthroscopy, SAD, RTCR 11/27/18, here for follow up of right shoulder pain. She has obtained MRI for right shoulder and here for review of results. The pain intermittent brought on with certain movements. She is not taking pain medication. She is considering surgical intervention given improvement with left shoulder pain following surgical rotator cuff repair.

## 2019-03-29 NOTE — DISCUSSION/SUMMARY
[de-identified] : 54 year-old female right shoulder RTC tear \par \par Updated MRI shows significant retraction and atrophy of the supraspinatus tendon and muscle past the glenoid. There is some humeral head elevation, with maintenance of glenohumeral joint articular cartilage. Concern for possible inability to repair supraspinatus tendon given the degree of retraction. However, anterior/posterior rotator cuff appear intact, and patient's function appears relatively maintained. Discussion was had with the patient regarding diagnostic surgical arthroscopy with possible attempts at repair versus superior capsular reconstruction (SCR) as an alternative treatment if repair is not possible patient voiced understanding and is electing to proceed.\par \par All risks, benefits and alternatives to the proposed surgical procedure, diagnostic arthroscopy with possible rotator cuff repair versus superior capsular reconstruction, as well as the need for formal post-operative rehabilitation were discussed in great detail with the patient. Risks include but are not limited to pain, bleeding, infection, neurovascular injury, stiffness, failure of repair, use of allograft, medical complications (including DVT, PE, MI), and risks of anesthesia. \par \par The patient expressed understanding and all questions were answered. The patient is electing to proceed, and will have the patient scheduled accordingly.\par \par

## 2019-03-29 NOTE — PHYSICAL EXAM
[de-identified] : Right shoulder exam\par \par Inspection: No malalignment, No defects, No atrophy\par Skin: No masses, No lesions\par Neck: Negative Spurlings, full ROM, no pain with ROM\par AROM: FF to 170, abduction to 90, ER to 70, IR to mid lumbar\par PROM: FF to 180, abduction to 90, ER to 70\par Painful arc ROM: Pain with active/passive forward flexion\par Tenderness: Positive bicipital tenderness, positive tenderness to the greater tuberosity/RTC insertion, positive anterior shoulder/lesser tuberosity tenderness\par Strength: 4+/5 ER, 5/5 IR in adduction, 4-/5 supraspinatus testing, positive O'Briens test\par AC Joint: Mild ttp/pain with cross arm testing\par Biceps: Speed Negative, Yergusons Negative\par Impingement test: Positive Casey, Positive Neer \par Stability: Stable\par Vasc: 2+ radial pulse\par Neuro: AIN, PIN, Ulnar nerve in tact to motor\par Sensation: In tact to light touch throughout [de-identified] : MRI right shoulder dated 2.27.17 reviewed that shows full-thickness supraspinatus tear. Overlying impingement. Bursitis.\par \par MRI right shoulder dated 3.22.19 was reviewed it shows significant atrophy of the supraspinatus tendon with high-grade retracted full with supraspinatus tendon avulsion past the glenoid. Posterior rotator cuff/subscapularis appear intact. Minimally superiorly displaced humeral head. Intact glenohumeral cartilage.

## 2019-04-08 ENCOUNTER — OUTPATIENT (OUTPATIENT)
Dept: OUTPATIENT SERVICES | Facility: HOSPITAL | Age: 54
LOS: 1 days | End: 2019-04-08
Payer: COMMERCIAL

## 2019-04-08 VITALS
SYSTOLIC BLOOD PRESSURE: 124 MMHG | HEIGHT: 61.25 IN | OXYGEN SATURATION: 96 % | DIASTOLIC BLOOD PRESSURE: 70 MMHG | HEART RATE: 81 BPM | TEMPERATURE: 98 F | WEIGHT: 126.99 LBS | RESPIRATION RATE: 16 BRPM

## 2019-04-08 DIAGNOSIS — E11.9 TYPE 2 DIABETES MELLITUS WITHOUT COMPLICATIONS: ICD-10-CM

## 2019-04-08 DIAGNOSIS — Z98.89 OTHER SPECIFIED POSTPROCEDURAL STATES: Chronic | ICD-10-CM

## 2019-04-08 DIAGNOSIS — M75.121 COMPLETE ROTATOR CUFF TEAR OR RUPTURE OF RIGHT SHOULDER, NOT SPECIFIED AS TRAUMATIC: ICD-10-CM

## 2019-04-08 DIAGNOSIS — Z98.890 OTHER SPECIFIED POSTPROCEDURAL STATES: Chronic | ICD-10-CM

## 2019-04-08 DIAGNOSIS — J45.909 UNSPECIFIED ASTHMA, UNCOMPLICATED: ICD-10-CM

## 2019-04-08 DIAGNOSIS — I10 ESSENTIAL (PRIMARY) HYPERTENSION: ICD-10-CM

## 2019-04-08 DIAGNOSIS — Z98.51 TUBAL LIGATION STATUS: Chronic | ICD-10-CM

## 2019-04-08 DIAGNOSIS — S62.102A FRACTURE OF UNSPECIFIED CARPAL BONE, LEFT WRIST, INITIAL ENCOUNTER FOR CLOSED FRACTURE: Chronic | ICD-10-CM

## 2019-04-08 DIAGNOSIS — M25.9 JOINT DISORDER, UNSPECIFIED: ICD-10-CM

## 2019-04-08 DIAGNOSIS — Z90.710 ACQUIRED ABSENCE OF BOTH CERVIX AND UTERUS: Chronic | ICD-10-CM

## 2019-04-08 DIAGNOSIS — N31.8 OTHER NEUROMUSCULAR DYSFUNCTION OF BLADDER: Chronic | ICD-10-CM

## 2019-04-08 DIAGNOSIS — K21.9 GASTRO-ESOPHAGEAL REFLUX DISEASE WITHOUT ESOPHAGITIS: ICD-10-CM

## 2019-04-08 DIAGNOSIS — G47.33 OBSTRUCTIVE SLEEP APNEA (ADULT) (PEDIATRIC): ICD-10-CM

## 2019-04-08 DIAGNOSIS — N39.3 STRESS INCONTINENCE (FEMALE) (MALE): Chronic | ICD-10-CM

## 2019-04-08 LAB
ALBUMIN SERPL ELPH-MCNC: 4.7 G/DL — SIGNIFICANT CHANGE UP (ref 3.3–5)
ALP SERPL-CCNC: 129 U/L — HIGH (ref 40–120)
ALT FLD-CCNC: 14 U/L — SIGNIFICANT CHANGE UP (ref 4–33)
ANION GAP SERPL CALC-SCNC: 13 MMO/L — SIGNIFICANT CHANGE UP (ref 7–14)
AST SERPL-CCNC: 14 U/L — SIGNIFICANT CHANGE UP (ref 4–32)
BILIRUB SERPL-MCNC: < 0.2 MG/DL — LOW (ref 0.2–1.2)
BUN SERPL-MCNC: 15 MG/DL — SIGNIFICANT CHANGE UP (ref 7–23)
CALCIUM SERPL-MCNC: 10.9 MG/DL — HIGH (ref 8.4–10.5)
CHLORIDE SERPL-SCNC: 100 MMOL/L — SIGNIFICANT CHANGE UP (ref 98–107)
CO2 SERPL-SCNC: 29 MMOL/L — SIGNIFICANT CHANGE UP (ref 22–31)
CREAT SERPL-MCNC: 0.63 MG/DL — SIGNIFICANT CHANGE UP (ref 0.5–1.3)
GLUCOSE SERPL-MCNC: 81 MG/DL — SIGNIFICANT CHANGE UP (ref 70–99)
HBA1C BLD-MCNC: 5.9 % — HIGH (ref 4–5.6)
HCT VFR BLD CALC: 39 % — SIGNIFICANT CHANGE UP (ref 34.5–45)
HGB BLD-MCNC: 12.7 G/DL — SIGNIFICANT CHANGE UP (ref 11.5–15.5)
MCHC RBC-ENTMCNC: 31.4 PG — SIGNIFICANT CHANGE UP (ref 27–34)
MCHC RBC-ENTMCNC: 32.6 % — SIGNIFICANT CHANGE UP (ref 32–36)
MCV RBC AUTO: 96.5 FL — SIGNIFICANT CHANGE UP (ref 80–100)
NRBC # FLD: 0 K/UL — SIGNIFICANT CHANGE UP (ref 0–0)
PLATELET # BLD AUTO: 266 K/UL — SIGNIFICANT CHANGE UP (ref 150–400)
PMV BLD: 10.9 FL — SIGNIFICANT CHANGE UP (ref 7–13)
POTASSIUM SERPL-MCNC: 4.3 MMOL/L — SIGNIFICANT CHANGE UP (ref 3.5–5.3)
POTASSIUM SERPL-SCNC: 4.3 MMOL/L — SIGNIFICANT CHANGE UP (ref 3.5–5.3)
PROT SERPL-MCNC: 7.8 G/DL — SIGNIFICANT CHANGE UP (ref 6–8.3)
RBC # BLD: 4.04 M/UL — SIGNIFICANT CHANGE UP (ref 3.8–5.2)
RBC # FLD: 12.2 % — SIGNIFICANT CHANGE UP (ref 10.3–14.5)
SODIUM SERPL-SCNC: 142 MMOL/L — SIGNIFICANT CHANGE UP (ref 135–145)
WBC # BLD: 5.45 K/UL — SIGNIFICANT CHANGE UP (ref 3.8–10.5)
WBC # FLD AUTO: 5.45 K/UL — SIGNIFICANT CHANGE UP (ref 3.8–10.5)

## 2019-04-08 PROCEDURE — 93010 ELECTROCARDIOGRAM REPORT: CPT

## 2019-04-08 RX ORDER — METFORMIN HYDROCHLORIDE 850 MG/1
1 TABLET ORAL
Qty: 0 | Refills: 0 | COMMUNITY

## 2019-04-08 RX ORDER — MILK THISTLE 150 MG
1 CAPSULE ORAL
Qty: 0 | Refills: 0 | COMMUNITY

## 2019-04-08 RX ORDER — GABAPENTIN 400 MG/1
3 CAPSULE ORAL
Qty: 0 | Refills: 0 | COMMUNITY

## 2019-04-08 RX ORDER — GABAPENTIN 400 MG/1
2 CAPSULE ORAL
Qty: 0 | Refills: 0 | COMMUNITY

## 2019-04-08 RX ORDER — FLUTICASONE PROPIONATE 50 MCG
1 SPRAY, SUSPENSION NASAL
Qty: 0 | Refills: 0 | COMMUNITY

## 2019-04-08 NOTE — H&P PST ADULT - RS GEN PE MLT RESP DETAILS PC
clear to auscultation bilaterally/no rales/good air movement/no wheezes/airway patent/respirations non-labored/breath sounds equal/no rhonchi

## 2019-04-08 NOTE — H&P PST ADULT - NSICDXPROBLEM_GEN_ALL_CORE_FT
PROBLEM DIAGNOSES  Problem: Disorder of shoulder  Assessment and Plan: Right Shoulder Major Debridement Subacromial Decompression Arthroscopy Rotator Cuff Repair , Biceps Tenotomy , possible Superior Capsular Reconstruction    Problem: Diabetes mellitus  Assessment and Plan:     Problem: GERD (gastroesophageal reflux disease)  Assessment and Plan: PROBLEM DIAGNOSES  Problem: Disorder of shoulder  Assessment and Plan: Right Shoulder Major Debridement Subacromial Decompression Arthroscopy Rotator Cuff Repair , Biceps Tenotomy , Possible Superior Capsular Reconstruction  Pre op instructions reviewed with pt including Hibiclens ; pt appears to have a good understanding of pre op instructions  Pt to Dr Mccord for pre op medical evaluation     Problem: Diabetes mellitus  Assessment and Plan: BMP,HGBA1C done 4/08/19  Pt to hold metformin day of surgery  FS dos    Problem: GERD (gastroesophageal reflux disease)  Assessment and Plan: Pt to take Zegerid dos     Problem: Asthma  Assessment and Plan: Pt to use ProAir dos if needed ;pt to bring dos     Problem: Hypertension  Assessment and Plan: Pt to take metoprolol dos PROBLEM DIAGNOSES  Problem: Disorder of shoulder  Assessment and Plan: Right Shoulder Major Debridement Subacromial Decompression Arthroscopy Rotator Cuff Repair , Biceps Tenotomy , Possible Superior Capsular Reconstruction  Pre op instructions reviewed with pt including Hibiclens ; pt appears to have a good understanding of pre op instructions  Pt to Dr Mccord for pre op medical evaluation   medications as per pt ; pt does not take medications as prescribed     Problem: Diabetes mellitus  Assessment and Plan: BMP,HGBA1C done 4/08/19  Pt to hold metformin day of surgery  FS dos    Problem: GERD (gastroesophageal reflux disease)  Assessment and Plan: Pt to take Zegerid dos     Problem: Asthma  Assessment and Plan: Pt to use ProAir dos if needed ;pt to bring dos     Problem: Hypertension  Assessment and Plan: Pt to take metoprolol dos    Problem: CAROL (obstructive sleep apnea)  Assessment and Plan: CAROL Precautions  OR booking notified via fax

## 2019-04-08 NOTE — H&P PST ADULT - MUSCULOSKELETAL
detailed exam no joint erythema/no joint warmth/no calf tenderness/decreased ROM due to pain/no joint swelling/diminished strength/right shoulder details…

## 2019-04-08 NOTE — H&P PST ADULT - NEGATIVE CARDIOVASCULAR SYMPTOMS
no palpitations/no dyspnea on exertion/no chest pain/no claudication/no peripheral edema/no paroxysmal nocturnal dyspnea

## 2019-04-08 NOTE — H&P PST ADULT - NEGATIVE GASTROINTESTINAL SYMPTOMS
no vomiting/no constipation/no abdominal pain/no diarrhea/no hiccoughs/no jaundice/no nausea/no melena/no change in bowel habits

## 2019-04-08 NOTE — H&P PST ADULT - NEGATIVE OPHTHALMOLOGIC SYMPTOMS
no blurred vision R/no diplopia/no discharge L/no pain L/no discharge R/no lacrimation L/no photophobia/no irritation L/no loss of vision R/no lacrimation R/no blurred vision L/no pain R/no loss of vision L/no irritation R

## 2019-04-08 NOTE — H&P PST ADULT - NSICDXFAMILYHX_GEN_ALL_CORE_FT
FAMILY HISTORY:  Father  Still living? Yes, Estimated age: 87  Family history of BPH, Age at diagnosis: Age Unknown  Family history of diabetes mellitus (DM), Age at diagnosis: Age Unknown    Mother  Still living? Unknown  Family history of diabetes mellitus (DM), Age at diagnosis: Age Unknown  Family history of hypertension, Age at diagnosis: Age Unknown

## 2019-04-08 NOTE — H&P PST ADULT - NSICDXPASTSURGICALHX_GEN_ALL_CORE_FT
SUBJECTIVE:   CC: Kevon Wiseman is an 30 year old woman who presents for preventive health visit.     Physical   Annual:     Getting at least 3 servings of Calcium per day::  Yes    Bi-annual eye exam::  NO    Dental care twice a year::  NO    Sleep apnea or symptoms of sleep apnea::  Daytime drowsiness    Diet::  Regular (no restrictions)    Frequency of exercise::  None    Taking medications regularly::  Yes    Medication side effects::  None    Additional concerns today::  No          Needs labs today for her annual follow up from bariatric surgery.  Patient and her wife desire children, patient is hoping to become pregnant. Planning to do home insemination with donor sperm.     Today's PHQ-2 Score:   PHQ-2 ( 1999 [employer]) 12/20/2017   Q1: Little interest or pleasure in doing things 1   Q2: Feeling down, depressed or hopeless 1   PHQ-2 Score 2   Q1: Little interest or pleasure in doing things Several days   Q2: Feeling down, depressed or hopeless Several days   PHQ-2 Score 2       Abuse: Current or Past(Physical, Sexual or Emotional)- No  Do you feel safe in your environment - Yes    Social History   Substance Use Topics     Smoking status: Current Some Day Smoker     Packs/day: 0.50     Years: 3.00     Types: Cigarettes     Last attempt to quit: 2/1/2011     Smokeless tobacco: Never Used     Alcohol use Yes     Alcohol Use 12/20/2017   If you drink alcohol, do you typically have greater than 3 drinks per day OR greater than 7 drinks per week?   Yes   AUDIT SCORE  10     AUDIT - Alcohol Use Disorders Identification Test - Reproduced from the World Health Organization Audit 2001 (Second Edition) 12/20/2017   1.  How often do you have a drink containing alcohol? 4 or more times a week   2.  How many drinks containing alcohol do you have on a typical day when you are drinking? 3 or 4   3.  How often do you have five or more drinks on one occasion? Monthly   4.  How often during the last year have you found that  you were not able to stop drinking once you had started? Less than monthly   5.  How often during the last year have you failed to do what was normally expected of you because of drinking? Never   6.  How often during the last year have you needed a first drink in the morning to get yourself going after a heavy drinking session? Less than monthly   7.  How often during the last year have you had a feeling of guilt or remorse after drinking? Less than monthly   8.  How often during the last year have you been unable to remember what happened the night before because of your drinking? Never   9.  Have you or someone else been injured because of your drinking? No   10. Has a relative, friend, doctor or other health care worker been concerned about your drinking or suggested you cut down? No   TOTAL SCORE 10         Reviewed orders with patient.  Reviewed health maintenance and updated orders accordingly - Yes  Patient Active Problem List   Diagnosis     Mild intermittent asthma     Obesity     BRCA1 positive     Irregular periods     Bariatric surgery status     Genital herpes     History of depression     Panic disorder without agoraphobia     Past Surgical History:   Procedure Laterality Date     ESOPHAGOSCOPY, GASTROSCOPY, DUODENOSCOPY (EGD), COMBINED N/A 6/24/2015    Procedure: COMBINED ESOPHAGOSCOPY, GASTROSCOPY, DUODENOSCOPY (EGD);  Surgeon: Duane, William Charles, MD;  Location: [facility]     GASTRIC BYPASS  7/27/2011     SURGICAL HISTORY OF -   x 2    PE Tubes     SURGICAL HISTORY OF -       Adenoidectomy     SURGICAL HISTORY OF -       (R) Tympanoplasty      SURGICAL HISTORY OF -   2002    ankle     UPPER GI ENDOSCOPY         Social History   Substance Use Topics     Smoking status: Current Some Day Smoker     Packs/day: 0.50     Years: 3.00     Types: Cigarettes     Last attempt to quit: 2/1/2011     Smokeless tobacco: Never Used     Alcohol use Yes     Family History   Problem Relation Age of Onset     CANCER  Mother 52     Ovarian, + BRCA 1     GASTROINTESTINAL DISEASE Mother      hep c     CANCER Maternal Aunt 46     Ovarian-     Breast Cancer Maternal Aunt 44          Breast Cancer Other 25     Asthma No family hx of      C.A.D. No family hx of      DIABETES No family hx of      Hypertension No family hx of      CEREBROVASCULAR DISEASE No family hx of      Cancer - colorectal No family hx of      Prostate Cancer No family hx of              Alternate mammogram schedule due to breast cancer family history discussed. Patient is BRCA 1 positive.    Pertinent mammograms are reviewed under the imaging tab.  History of abnormal Pap smear:   Last 3 Pap and HPV Results:   PAP / HPV 2015   PAP NIL NIL NIL       Reviewed and updated as needed this visit by clinical staffTobacco  Allergies  Meds  Med Hx  Surg Hx  Fam Hx  Soc Hx        Reviewed and updated as needed this visit by Provider        Past Medical History:   Diagnosis Date     Asthma      Ulcer (H)     Bleeding ulcer     Unspecified closed fracture of ankle     Left ankle/leg      Past Surgical History:   Procedure Laterality Date     ESOPHAGOSCOPY, GASTROSCOPY, DUODENOSCOPY (EGD), COMBINED N/A 2015    Procedure: COMBINED ESOPHAGOSCOPY, GASTROSCOPY, DUODENOSCOPY (EGD);  Surgeon: Duane, William Charles, MD;  Location: [facility]     GASTRIC BYPASS  2011     SURGICAL HISTORY OF -   x 2    PE Tubes     SURGICAL HISTORY OF -       Adenoidectomy     SURGICAL HISTORY OF -       (R) Tympanoplasty      SURGICAL HISTORY OF -       ankle     UPPER GI ENDOSCOPY         Review of Systems  C: NEGATIVE for fever, chills, change in weight  I: NEGATIVE for worrisome rashes, moles or lesions  E: NEGATIVE for vision changes or irritation  ENT: NEGATIVE for ear, mouth and throat problems  R: NEGATIVE for significant cough or SOB  B: NEGATIVE for masses, tenderness or discharge  CV: NEGATIVE for chest pain, palpitations or peripheral  "edema  GI: NEGATIVE for nausea, abdominal pain, heartburn, or change in bowel habits  : NEGATIVE for unusual urinary or vaginal symptoms. Periods are regular.  M: NEGATIVE for significant arthralgias or myalgia  N: NEGATIVE for weakness, dizziness or paresthesias  P: NEGATIVE for changes in mood or affect     OBJECTIVE:   /79  Pulse 93  Temp 98  F (36.7  C) (Oral)  Ht 5' 5\" (1.651 m)  Wt 173 lb 12.8 oz (78.8 kg)  LMP 12/10/2017 (Exact Date)  BMI 28.92 kg/m2  Physical Exam  GENERAL: healthy, alert and no distress  EYES: Eyes grossly normal to inspection, PERRL and conjunctivae and sclerae normal  HENT: ear canals and TM's normal, nose and mouth without ulcers or lesions  NECK: no adenopathy, no asymmetry, masses, or scars and thyroid normal to palpation  RESP: lungs clear to auscultation - no rales, rhonchi or wheezes  BREAST: normal without masses, tenderness or nipple discharge and no palpable axillary masses or adenopathy  CV: regular rate and rhythm, normal S1 S2, no S3 or S4, no murmur, click or rub, no peripheral edema and peripheral pulses strong  ABDOMEN: soft, nontender, no hepatosplenomegaly, no masses and bowel sounds normal   (female): normal female external genitalia, normal urethral meatus, vaginal mucosa pink, moist, well rugated, and normal cervix/adnexa/uterus without masses or discharge  MS: no gross musculoskeletal defects noted, no edema  SKIN: no suspicious lesions or rashes  NEURO: Normal strength and tone, mentation intact and speech normal  PSYCH: mentation appears normal, affect normal/bright    ASSESSMENT/PLAN:   1. Encounter for gynecological examination without abnormal finding  Discussed her insemination plans. Continue PNV.   - Pap imaged thin layer screen with HPV - recommended age 30 - 65 years (select HPV order below)  - HPV High Risk Types DNA Cervical    2. Bariatric surgery status    - CBC with platelets differential  - Ferritin  - Vitamin D Deficiency  - Vitamin " B12  - Iron    COUNSELING:  Reviewed preventive health counseling, as reflected in patient instructions  Special attention given to:        Regular exercise       Healthy diet/nutrition       Folic Acid Counseling      Counseling Resources:  ATP IV Guidelines  Pooled Cohorts Equation Calculator  Breast Cancer Risk Calculator  FRAX Risk Assessment  ICSI Preventive Guidelines  Dietary Guidelines for Americans, 2010  USDA's MyPlate  ASA Prophylaxis  Lung CA Screening    SARAY Acharya CNP  [facility] [place]  Answers for HPI/ROS submitted by the patient on 12/20/2017   PHQ-2 Score: 2     PAST SURGICAL HISTORY:  Female stress incontinence sling procedure     H/O abdominal hysterectomy     H/O arthroscopic knee surgery left    H/O bilateral inguinal hernia repair     H/O carpal tunnel repair RIGHT Hand 2017    H/O cervical polypectomy     H/O colonoscopy with polypectomy benign    H/O colposcopy with cervical biopsy     H/O tubal ligation     H/O:      Left wrist fracture 10 years ago with repair and placement of pins    Other functional disorder of bladder bladder suspension for incontience    S/P carpal tunnel release left; and left middle trigger finger release; 17    S/P trigger finger release RIGHT Middle Finger 2017 PAST SURGICAL HISTORY:  Female stress incontinence sling procedure ; revision x 2 last     H/O abdominal hysterectomy     H/O arthroscopic knee surgery left    H/O arthroscopy Right Shoulder    H/O bilateral inguinal hernia repair     H/O carpal tunnel repair RIGHT Hand 2017    H/O cervical polypectomy     H/O colonoscopy with polypectomy benign    H/O colposcopy with cervical biopsy     H/O tubal ligation     H/O:      History of D&C     Left wrist fracture 10 years ago with repair and placement of pins     (normal spontaneous vaginal delivery)     Other functional disorder of bladder bladder suspension for incontience    S/P carpal tunnel release left; and left middle trigger finger release; 17    S/P trigger finger release RIGHT Middle Finger 2017

## 2019-04-08 NOTE — H&P PST ADULT - NEGATIVE ENMT SYMPTOMS
no tinnitus/no dry mouth/no recurrent cold sores/no vertigo/no sinus symptoms/no nasal congestion/no hearing difficulty/no nose bleeds/no ear pain/no nasal discharge/no throat pain/no dysphagia

## 2019-04-08 NOTE — H&P PST ADULT - NSICDXPASTMEDICALHX_GEN_ALL_CORE_FT
PAST MEDICAL HISTORY:  Asthma with allergic rhinitis     Asthma, stable     Carpal tunnel syndrome, left upper limb     Carpal tunnel syndrome, right upper limb     Depression controlled with meds    Dyslipidemia     Fibroids uterine    Fibromyalgia     Foot fracture, right s/p fall on 9/3/2014, no surgery recommended,, foot brace maintained f/u with Dr. Ld Jay (orthopedic)    Gastroparesis     Hypothyroid     Left knee pain     Ovarian cyst left    Prediabetes     Stress incontinence in female     Thyroid nodule s/p biopsy (benign)    Trigger finger, left middle finger     Trigger finger, right middle finger     Type 2 diabetes mellitus PAST MEDICAL HISTORY:  Anemia     Anxiety     Arthritis     Asthma with allergic rhinitis     Asthma, stable     Carpal tunnel syndrome, left upper limb     Carpal tunnel syndrome, right upper limb     Depression controlled with meds    Dyslipidemia     Fibroids uterine    Fibromyalgia     Foot fracture, right s/p fall on 9/3/2014, no surgery recommended,, foot brace maintained f/u with Dr. Ld Jay (orthopedic)    Gastroparesis     GERD (gastroesophageal reflux disease)     H/O diabetic neuropathy     Hypothyroid     Left knee pain pt reports bilateral knee pain    Ovarian cyst left    Prediabetes     Stress incontinence in female     Thyroid nodule s/p biopsy (benign)    Trigger finger, left middle finger     Trigger finger, right middle finger     Type 2 diabetes mellitus

## 2019-04-08 NOTE — H&P PST ADULT - NEGATIVE GENERAL GENITOURINARY SYMPTOMS
no urine discoloration/no urinary hesitancy/normal urinary frequency/no renal colic/no gas in urine/no nocturia/no hematuria/no flank pain L/no bladder infections

## 2019-04-08 NOTE — H&P PST ADULT - HISTORY OF PRESENT ILLNESS
Pt is a 54 y.o. female ; pt s/p Left Shoulder Arthroscopy 11/18. Pt c/o Right Shoulder pain x 3 years ; pt states it has increased in severity ; an mri was done. Pt now presents for right Shoulder major Debridement , Subacromial Decompression arthroscopy rotator Cuff Repair Biceps Tenotomy Possible Superior Capsular Reconstruction Pt is a 54 y.o. female ; pt s/p Left Shoulder Arthroscopy 11/18. Pt c/o Right Shoulder pain x 3 years ; pt states it has increased in severity ; an mri was done. Pt now presents for Right Shoulder Major Debridement , Subacromial Decompression Arthroscopy Rotator Cuff Repair Biceps Tenotomy Possible Superior Capsular Reconstruction

## 2019-04-22 ENCOUNTER — TRANSCRIPTION ENCOUNTER (OUTPATIENT)
Age: 54
End: 2019-04-22

## 2019-04-23 ENCOUNTER — APPOINTMENT (OUTPATIENT)
Dept: ORTHOPEDIC SURGERY | Facility: AMBULATORY SURGERY CENTER | Age: 54
End: 2019-04-23

## 2019-04-23 ENCOUNTER — OUTPATIENT (OUTPATIENT)
Dept: OUTPATIENT SERVICES | Facility: HOSPITAL | Age: 54
LOS: 1 days | Discharge: HOME | End: 2019-04-23
Payer: COMMERCIAL

## 2019-04-23 VITALS
DIASTOLIC BLOOD PRESSURE: 54 MMHG | SYSTOLIC BLOOD PRESSURE: 117 MMHG | HEART RATE: 76 BPM | TEMPERATURE: 98 F | WEIGHT: 126.99 LBS | OXYGEN SATURATION: 97 % | HEIGHT: 61.25 IN | RESPIRATION RATE: 16 BRPM

## 2019-04-23 VITALS
RESPIRATION RATE: 18 BRPM | TEMPERATURE: 98 F | HEART RATE: 75 BPM | SYSTOLIC BLOOD PRESSURE: 102 MMHG | DIASTOLIC BLOOD PRESSURE: 52 MMHG | OXYGEN SATURATION: 99 %

## 2019-04-23 DIAGNOSIS — Z98.89 OTHER SPECIFIED POSTPROCEDURAL STATES: Chronic | ICD-10-CM

## 2019-04-23 DIAGNOSIS — N39.3 STRESS INCONTINENCE (FEMALE) (MALE): Chronic | ICD-10-CM

## 2019-04-23 DIAGNOSIS — M75.121 COMPLETE ROTATOR CUFF TEAR OR RUPTURE OF RIGHT SHOULDER, NOT SPECIFIED AS TRAUMATIC: ICD-10-CM

## 2019-04-23 DIAGNOSIS — Z98.890 OTHER SPECIFIED POSTPROCEDURAL STATES: Chronic | ICD-10-CM

## 2019-04-23 DIAGNOSIS — S62.102A FRACTURE OF UNSPECIFIED CARPAL BONE, LEFT WRIST, INITIAL ENCOUNTER FOR CLOSED FRACTURE: Chronic | ICD-10-CM

## 2019-04-23 DIAGNOSIS — N31.8 OTHER NEUROMUSCULAR DYSFUNCTION OF BLADDER: Chronic | ICD-10-CM

## 2019-04-23 DIAGNOSIS — Z90.710 ACQUIRED ABSENCE OF BOTH CERVIX AND UTERUS: Chronic | ICD-10-CM

## 2019-04-23 DIAGNOSIS — Z98.51 TUBAL LIGATION STATUS: Chronic | ICD-10-CM

## 2019-04-23 PROCEDURE — 29999 UNLISTED PX ARTHROSCOPY: CPT | Mod: RT

## 2019-04-23 PROCEDURE — 29823 SHO ARTHRS SRG XTNSV DBRDMT: CPT | Mod: 59,RT

## 2019-04-23 PROCEDURE — 29806 SHO ARTHRS SRG CAPSULORRAPHY: CPT | Mod: RT

## 2019-04-23 PROCEDURE — 29826 SHO ARTHRS SRG DECOMPRESSION: CPT | Mod: RT

## 2019-04-23 RX ORDER — SODIUM CHLORIDE 9 MG/ML
1000 INJECTION, SOLUTION INTRAVENOUS
Qty: 0 | Refills: 0 | Status: DISCONTINUED | OUTPATIENT
Start: 2019-04-23 | End: 2019-05-08

## 2019-04-23 NOTE — ASU DISCHARGE PLAN (ADULT/PEDIATRIC) - ASU DC SPECIAL INSTRUCTIONSFT
see instruction sheet for dressing and activity instructions see instruction sheet for dressing and activity instructions. take medication as prescribed. call for follow up in office 10-14 days after surgery.

## 2019-04-23 NOTE — ASU DISCHARGE PLAN (ADULT/PEDIATRIC) - CALL YOUR DOCTOR IF YOU HAVE ANY OF THE FOLLOWING:
Pain not relieved by Medications/Swelling that gets worse/Wound/Surgical Site with redness, or foul smelling discharge or pus/Bleeding that does not stop/Fever greater than (need to indicate Fahrenheit or Celsius)

## 2019-05-06 ENCOUNTER — APPOINTMENT (OUTPATIENT)
Dept: ORTHOPEDIC SURGERY | Facility: CLINIC | Age: 54
End: 2019-05-06
Payer: COMMERCIAL

## 2019-05-06 PROBLEM — Z86.39 PERSONAL HISTORY OF OTHER ENDOCRINE, NUTRITIONAL AND METABOLIC DISEASE: Chronic | Status: ACTIVE | Noted: 2019-04-08

## 2019-05-06 PROBLEM — D64.9 ANEMIA, UNSPECIFIED: Chronic | Status: ACTIVE | Noted: 2019-04-08

## 2019-05-06 PROBLEM — F41.9 ANXIETY DISORDER, UNSPECIFIED: Chronic | Status: ACTIVE | Noted: 2019-04-08

## 2019-05-06 PROBLEM — M19.90 UNSPECIFIED OSTEOARTHRITIS, UNSPECIFIED SITE: Chronic | Status: ACTIVE | Noted: 2019-04-08

## 2019-05-06 PROBLEM — K21.9 GASTRO-ESOPHAGEAL REFLUX DISEASE WITHOUT ESOPHAGITIS: Chronic | Status: ACTIVE | Noted: 2019-04-08

## 2019-05-06 PROCEDURE — 99024 POSTOP FOLLOW-UP VISIT: CPT

## 2019-05-06 NOTE — HISTORY OF PRESENT ILLNESS
[7] : the patient reports pain that is 7/10 in severity [Chills] : no chills [Fever] : no fever [Nausea] : no nausea [Vomiting] : no vomiting [Healed] : healed [Clean/Dry/Intact] : clean, dry and intact [Erythema] : not erythematous [Discharge] : absent of discharge [Swelling] : not swollen [Dehiscence] : not dehisced [Neuro Intact] : an unremarkable neurological exam [Vascular Intact] : ~T peripheral vascular exam normal [Doing Well] : is doing well [Excellent Pain Control] : has excellent pain control [No Sign of Infection] : is showing no signs of infection [Sutures Removed] : sutures were removed [Steri-Strips Removed & Replaced] : steri-strips removed and replaced [de-identified] : 54-year-old female status post right shoulder arthroscopic SAD, biceps tenotomy, superior capsular reconstruction. She is doing well. She is taking Percocet for pain. Presents in post op brace. Denies post op complications.   [de-identified] : 54-year-old female status post right shoulder arthroscopic SAD, biceps tenotomy, superior capsular reconstruction 4/23/19 [de-identified] : Right shoulder exam\par \par Inspection: No significant ecchymosis, no residual swelling\par Skin: Incisions C/D/I, no drainage, healed\par ROM: not tested \par Painful arc ROM: not tested\par Tenderness: Tenderness throughout the shoulder girdle\par Strength: Unable to test\par Vasc: 2+ radial pulse\par Neuro: AIN, PIN, Ulnar nerve in tact to motor\par Sensation: In tact to light touch throughout\par \par  [de-identified] : 54-year-old female status post right shoulder arthroscopic SAD, biceps tenotomy, superior capsular reconstruction\par \par Discussion was had with the patient regarding intraoperative findings of a 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 Internal slides were performed, and patient continued to have severe exposure of the superior joint with minimal arthrosis. Patient was a good candidate for a superior capsular reconstruction was performed with dermal allograft.\par \par Patient voiced understanding regarding the procedure, and understands that this is a different complex procedure as opposed to the prior shoulder rotator cuff repair.\par \par Patient voiced understanding. Continue immobilization x2 weeks, begin PT in 2 weeks as per protocol.

## 2019-06-03 ENCOUNTER — APPOINTMENT (OUTPATIENT)
Dept: ORTHOPEDIC SURGERY | Facility: CLINIC | Age: 54
End: 2019-06-03
Payer: COMMERCIAL

## 2019-06-03 PROCEDURE — 99024 POSTOP FOLLOW-UP VISIT: CPT

## 2019-06-04 NOTE — HISTORY OF PRESENT ILLNESS
[7] : the patient reports pain that is 7/10 in severity [Clean/Dry/Intact] : clean, dry and intact [Healed] : healed [Neuro Intact] : an unremarkable neurological exam [Vascular Intact] : ~T peripheral vascular exam normal [Doing Well] : is doing well [Excellent Pain Control] : has excellent pain control [No Sign of Infection] : is showing no signs of infection [Chills] : no chills [Vomiting] : no vomiting [Fever] : no fever [Nausea] : no nausea [Swelling] : not swollen [Discharge] : absent of discharge [Dehiscence] : not dehisced [Erythema] : not erythematous [de-identified] : 54-year-old female status post right shoulder arthroscopic SAD, biceps tenotomy, superior capsular reconstruction 4/23/19 [de-identified] : 54-year-old female status post right shoulder arthroscopic SAD, biceps tenotomy, superior capsular reconstruction. She is doing well. She is taking Percocet for pain. Presents in post op brace. Denies post op complications.   [de-identified] : 54-year-old female status post right shoulder arthroscopic SAD, biceps tenotomy, superior capsular reconstruction\par \par \par Patient reports pain to the right shoulder, but discussion was had with the patient regarding some loss of motion and stiffness from postoperative immobilization in the brace. At this time, patient doing well with good early passive and active range of motion. Discussion was had with the patient regarding continuation of physical therapy for continued range of motion therapy. Strengthening and conditioning should begin at 3 months postop.\par \par Recommendation: Continue PT per protocol. Ice/NSAIDs as able. Wean narcotic use.\par \par Followup 6 weeks [de-identified] : Right shoulder exam\par \par Inspection: No significant ecchymosis, no residual swelling\par Skin: Incisions C/D/I, no drainage, healed\par AROM: FF 80, ABD 60, ER 30, IR hip\par Painful arc ROM: Further PROM\par Tenderness: Tenderness throughout the shoulder girdle\par Strength: 4/5 ER, 4/5 IR, 3-/5 FF\par Vasc: 2+ radial pulse\par Neuro: AIN, PIN, Ulnar nerve in tact to motor\par Sensation: In tact to light touch throughout\par

## 2019-07-04 NOTE — ASU DISCHARGE PLAN (ADULT/PEDIATRIC). - INSTRUCTIONS
History  Chief Complaint   Patient presents with    Fever - 9 weeks to 74 years     Cough, ear tugging, fever onset yesterday  LD Tylenol and Motrin around 1800  This is a 25month-old male patient was born full-term mother states without complication fully vaccinated  Starting yesterday he had a fever 100 9 with a mild cough without difficulty breathing runny nose and pulling at his right ear  The fever responded to Tylenol he is currently afebrile today he is not eating as much but he is drinking wetting diapers she is nontoxic in no acute distress responsive positive negative stimuli appropriately no vomiting no diarrhea no difficulty breathing no foul-smelling urine  He is in the room drinking apple juice          Prior to Admission Medications   Prescriptions Last Dose Informant Patient Reported? Taking?   acetaminophen (TYLENOL) 160 mg/5 mL liquid   No No   Si 5 mL PO q4-6 hours prn fever or pain   sodium chloride (OCEAN) 0 65 % nasal spray   No No   Si spray into each nostril as needed for congestion      Facility-Administered Medications: None       Past Medical History:   Diagnosis Date    Jaundice        Past Surgical History:   Procedure Laterality Date    CIRCUMCISION         Family History   Problem Relation Age of Onset    Heart murmur Mother     Heart murmur Father     Diabetes Maternal Grandmother     Celiac disease Paternal Grandmother     Asthma Maternal Uncle      I have reviewed and agree with the history as documented  Social History     Tobacco Use    Smoking status: Never Smoker    Smokeless tobacco: Never Used   Substance Use Topics    Alcohol use: Not on file    Drug use: Not on file        Review of Systems   Unable to perform ROS: Age       Physical Exam  Physical Exam   Constitutional: He appears well-developed  HENT:   Head: Atraumatic  Right Ear: External ear, pinna and canal normal  No mastoid tenderness  Tympanic membrane is erythematous     Left Ear: Tympanic membrane, external ear, pinna and canal normal    Nose: Nose normal    Mouth/Throat: Mucous membranes are moist  Oropharynx is clear  Eyes: Pupils are equal, round, and reactive to light  Conjunctivae and EOM are normal    Neck: Normal range of motion  Neck supple  Cardiovascular: Normal rate and regular rhythm  Pulmonary/Chest: Effort normal and breath sounds normal    Abdominal: Soft  Bowel sounds are normal  He exhibits no distension  There is no tenderness  There is no rebound and no guarding  No hernia  Musculoskeletal: Normal range of motion  Neurological: He is alert  He has normal reflexes  Skin: Skin is warm and moist  No petechiae, no purpura and no rash noted  No cyanosis  No jaundice or pallor  Nursing note and vitals reviewed        Vital Signs  ED Triage Vitals [07/03/19 2015]   Temperature Pulse Respirations Blood Pressure SpO2   (!) 97 3 °F (36 3 °C) (!) 141 26 (!) 119/57 99 %      Temp src Heart Rate Source Patient Position - Orthostatic VS BP Location FiO2 (%)   Temporal Monitor Sitting Right leg --      Pain Score       No Pain           Vitals:    07/03/19 2015   BP: (!) 119/57   Pulse: (!) 141   Patient Position - Orthostatic VS: Sitting         Visual Acuity      ED Medications  Medications - No data to display    Diagnostic Studies  Results Reviewed     None                 No orders to display              Procedures  Procedures       ED Course                               MDM    Disposition  Final diagnoses:   Fever   Otitis media     Time reflects when diagnosis was documented in both MDM as applicable and the Disposition within this note     Time User Action Codes Description Comment    7/3/2019  8:30 PM Gian Mahoney Add [R50 9] Fever     7/3/2019  8:30 PM [place], [address] [place] [address] Add [H66 90] Otitis media       ED Disposition     ED Disposition Condition Date/Time Comment    Discharge Stable Wed Jul 3, 2019  8:30 PM Speedy Dunlap discharge to home/self care             Follow-up Information     Follow up With Specialties Details Why Casimiro Harden MD Pediatrics Schedule an appointment as soon as possible for a visit   [address] [place] [address] [address] [place] [address]  [phone]            Patient's Medications   Discharge Prescriptions    AMOXICILLIN (AMOXIL) 400 MG/5ML SUSPENSION    Take 5 mL (400 mg total) by mouth 2 (two) times a day for 7 days       Start Date: 7/3/2019  End Date: 7/10/2019       Order Dose: 400 mg       Quantity: 70 mL    Refills: 0     No discharge procedures on file      ED Provider  Electronically Signed by           Spenser Maravilla PA-C  07/03/19 8300 Please follow instruction sheet. Please do not participate in heavy lifting or strenuous exercise. Do not drive while taking pain medication.    Please go to the emergency department if you experience pain not controlled by pain medication, bleeding that will not stop, fevers or chills. Keep first meal light. Nothing fried, spicy or greasy. Increase fluids.

## 2019-07-10 ENCOUNTER — APPOINTMENT (OUTPATIENT)
Dept: ORTHOPEDIC SURGERY | Facility: CLINIC | Age: 54
End: 2019-07-10
Payer: COMMERCIAL

## 2019-07-10 PROCEDURE — 99024 POSTOP FOLLOW-UP VISIT: CPT

## 2019-07-10 NOTE — HISTORY OF PRESENT ILLNESS
[de-identified] : 54-year-old female status post right shoulder arthroscopic SAD, biceps tenotomy, superior capsular reconstruction. Attending PT 3 x per week. Reports pain that radiates from shoulder into the neck. Denies post op complications. The patient reports pain that is 7/10 in severity. Happy though with progress, understands complexity of the injury.

## 2019-07-10 NOTE — DISCUSSION/SUMMARY
[de-identified] : 54-year-old female status post right shoulder arthroscopic SAD, biceps tenotomy, superior capsular reconstruction\par \par Patient has mild Oc deformity consistent with biceps tenotomy. Discussed this is part of the superior capsular reconstruction. Range of motion is significantly improving, however, patient continues to report significant pain with abduction as well as rotation. However, considering the degree of injury and need for SCR, patient is doing extremely well at this time.\par \par Begin strengthening and conditioning at this time. Discussed that she may not have full function until one year postop. However, patient does report that she feels better from preoperative symptoms.\par \par Continue PT\par \par Followup 3 months

## 2019-07-10 NOTE — REASON FOR VISIT
[FreeTextEntry2] :  status post right shoulder arthroscopic SAD, biceps tenotomy, superior capsular reconstruction 4/23/19.

## 2019-07-26 NOTE — H&P PST ADULT - PSH
Female stress incontinence  sling procedure   H/O abdominal hysterectomy    H/O arthroscopic knee surgery  left  H/O bilateral inguinal hernia repair    H/O carpal tunnel repair  RIGHT Hand 2017  H/O cervical polypectomy    H/O colonoscopy with polypectomy  benign  H/O colposcopy with cervical biopsy    H/O tubal ligation    H/O:     Left wrist fracture  10 years ago with repair and placement of pins  Other functional disorder of bladder  bladder suspension for incontience  S/P trigger finger release  RIGHT Middle Finger 2017
147

## 2019-09-11 ENCOUNTER — APPOINTMENT (OUTPATIENT)
Dept: ORTHOPEDIC SURGERY | Facility: CLINIC | Age: 54
End: 2019-09-11
Payer: COMMERCIAL

## 2019-09-11 VITALS
WEIGHT: 122 LBS | HEIGHT: 63 IN | DIASTOLIC BLOOD PRESSURE: 82 MMHG | SYSTOLIC BLOOD PRESSURE: 127 MMHG | HEART RATE: 96 BPM | BODY MASS INDEX: 21.62 KG/M2

## 2019-09-11 PROCEDURE — 99213 OFFICE O/P EST LOW 20 MIN: CPT

## 2019-09-12 NOTE — DISCUSSION/SUMMARY
[de-identified] : 54-year-old female status post right shoulder arthroscopic SAD, biceps tenotomy, superior capsular reconstruction\par \par Patient has good functional range of motion and strength to the right shoulder following superior capsular reconstruction. No significant deficits are seen on examination. Patient does have some cramping within the biceps muscle following tenotomy, but discussed that this is part of the surgical process. This will improve.\par \par Would recommend continued gradual return to exercise as tolerated. Patient has had a good postoperative outcome despite degree of injury.\par \par Patient requesting followup with MRIs of both knees and she has concerns regarding prior meniscectomy. Patient not willing to obtain x-rays at this time.\par \par Followup after MRI knees, 2 months for review right shoulder pain

## 2019-09-12 NOTE — HISTORY OF PRESENT ILLNESS
[de-identified] : 54-year-old female status post right shoulder arthroscopic SAD, biceps tenotomy, superior capsular reconstruction 4.23.19. She stopped PT 1 month ago because she went on vacation. She has improved ROM but has pain lifting.  She reports some residual pain extreme ROM. Reports weakness in the arm, and pain to the biceps. Denies post op complications. Happy though with progress.

## 2019-09-12 NOTE — PHYSICAL EXAM
[de-identified] : Right shoulder exam\par \par Inspection: No swelling\par Skin: Incisions C/D/I, no drainage, healed\par AROM: , ABD 90, ER 80, IR mid lumbar\par Painful arc ROM: Minimmal\par Tenderness: Min to biceps\par Strength: 4+/5 ER, 5/5 IR, 4+/5 FF\par Vasc: 2+ radial pulse\par Neuro: AIN, PIN, Ulnar nerve in tact to motor\par Sensation: In tact to light touch throughout

## 2019-09-18 ENCOUNTER — OTHER (OUTPATIENT)
Age: 54
End: 2019-09-18

## 2019-09-23 ENCOUNTER — APPOINTMENT (OUTPATIENT)
Dept: ORTHOPEDIC SURGERY | Facility: CLINIC | Age: 54
End: 2019-09-23
Payer: COMMERCIAL

## 2019-09-23 VITALS
SYSTOLIC BLOOD PRESSURE: 121 MMHG | DIASTOLIC BLOOD PRESSURE: 76 MMHG | WEIGHT: 120 LBS | HEIGHT: 63 IN | BODY MASS INDEX: 21.26 KG/M2

## 2019-09-23 DIAGNOSIS — M25.562 PAIN IN RIGHT KNEE: ICD-10-CM

## 2019-09-23 DIAGNOSIS — M25.561 PAIN IN RIGHT KNEE: ICD-10-CM

## 2019-09-23 DIAGNOSIS — G89.29 PAIN IN RIGHT KNEE: ICD-10-CM

## 2019-09-23 PROCEDURE — 99214 OFFICE O/P EST MOD 30 MIN: CPT

## 2019-09-24 PROBLEM — M25.561 CHRONIC PAIN OF BOTH KNEES: Status: ACTIVE | Noted: 2018-10-03

## 2019-09-24 NOTE — HISTORY OF PRESENT ILLNESS
[de-identified] : 54 year old female s/p left knee partial meniscectomy 2 years ago, presents today with right knee pain x 2 years, and chronic left knee pain. No injury reported. The pain is intermittent brought on with walking and stairs. She is not taking pain medication. She obtained MRI (per her request) and is here for review of results. She forgot to bring the MRI CD with her, however. Denies buckling, catching, numbness or tingling.

## 2019-09-24 NOTE — PHYSICAL EXAM
[de-identified] : Oriented to time, place, person\par Mood: Normal\par Affect: Normal\par Appearance: Healthy, well appearing, no acute distress.\par Gait: Normal\par Assistive Devices: None\par \par Left knee exam\par \par Skin: Clean, dry, intact\par Inspection: No obvious malalignment, no masses, no swelling, no effusion, + patellar grind\par Pulses: 2+ DP/PT pulses\par ROM: 0-125 degrees of flexion. No pain with deep knee flexion/extension.\par Tenderness: + MJLT. No LJLT. No pain over the patella facets. No pain to the quadriceps tendon. + pain to the patella tendon. No posterior knee tenderness.\par Stability: Stable to varus, valgus. Negative lachman testing. Negative anterior drawer, negative posterior drawer.\par Strength: 5/5 Q/H/TA/GS/EHL, without atrophy\par Neuro: In tact to light touch throughout, DTR's normal\par Additional tests: Negative McMurrays test, Negative patellar grind test. \par \par Right knee exam\par \par Skin: Clean, dry, intact\par Inspection: No obvious malalignment, no masses, no swelling, no effusion, + patellar grind\par Pulses: 2+ DP/PT pulses\par ROM: 0-125 degrees of flexion. No pain with deep knee flexion/extension.\par Tenderness: No MJLT. No LJLT. No pain over the patella facets. No pain to the quadriceps tendon. + pain to the patella tendon. No posterior knee tenderness.\par Stability: Stable to varus, valgus. Negative lachman testing. Negative anterior drawer, negative posterior drawer.\par Strength: 5/5 Q/H/TA/GS/EHL, without atrophy\par Neuro: In tact to light touch throughout, DTR's normal\par Additional tests: Negative McMurrays test, Negative patellar grind test.  [de-identified] : MRI reports reviewed. No new tear. Images not available. \par \par Patient declined XR.

## 2019-09-24 NOTE — DISCUSSION/SUMMARY
[de-identified] : 54 year old female presents with bilateral knee pain.\par \par Presentation is consistent with early degenerative change and arthrosis to the knee. Described that this is likely due to overuse, and age-related "wear and tear". MRIs reviewed show early breakdown of patellofemoral joint. Patient has no evidence of new meniscal pathology, and is status post prior meniscectomy left knee.\par \par Recommendation: Conservative care & observation; including rest/activity avoidance until less symptomatic with subsequent gradual return to full low impact activity as tolerated. Patient may also use OTC NSAID's or acetaminophen as tolerated, with application of ice/heat to the area 2-3x daily for 20 minutes after periods of activity. \par \par Followup as needed if pain persists for further evaluation and treatment. Possible HA injection.\par

## 2019-09-24 NOTE — ADDENDUM
[FreeTextEntry1] : This note was written by Deisi Veras on 09/23/2019 acting solely as a scribe for Dr. Garry Tony.\par \par All medical record entries made by the Scribe were at my, Dr. Garry Tony, direction and personally dictated by me on 09/23/2019. I have personally reviewed the chart and agree that the record accurately reflects my personal performance of the history, physical exam, assessment and plan.\par

## 2019-09-26 ENCOUNTER — APPOINTMENT (OUTPATIENT)
Dept: ORTHOPEDIC SURGERY | Facility: CLINIC | Age: 54
End: 2019-09-26
Payer: COMMERCIAL

## 2019-09-30 ENCOUNTER — APPOINTMENT (OUTPATIENT)
Dept: ORTHOPEDIC SURGERY | Facility: CLINIC | Age: 54
End: 2019-09-30
Payer: COMMERCIAL

## 2019-09-30 VITALS
HEIGHT: 60 IN | SYSTOLIC BLOOD PRESSURE: 116 MMHG | BODY MASS INDEX: 23.95 KG/M2 | DIASTOLIC BLOOD PRESSURE: 71 MMHG | HEART RATE: 87 BPM | WEIGHT: 122 LBS

## 2019-09-30 DIAGNOSIS — M54.12 RADICULOPATHY, CERVICAL REGION: ICD-10-CM

## 2019-09-30 DIAGNOSIS — M54.16 RADICULOPATHY, LUMBAR REGION: ICD-10-CM

## 2019-09-30 PROCEDURE — 99214 OFFICE O/P EST MOD 30 MIN: CPT

## 2019-09-30 PROCEDURE — 72040 X-RAY EXAM NECK SPINE 2-3 VW: CPT

## 2019-09-30 PROCEDURE — 72070 X-RAY EXAM THORAC SPINE 2VWS: CPT

## 2019-09-30 PROCEDURE — 72100 X-RAY EXAM L-S SPINE 2/3 VWS: CPT

## 2019-10-01 ENCOUNTER — OTHER (OUTPATIENT)
Age: 54
End: 2019-10-01

## 2019-10-14 ENCOUNTER — APPOINTMENT (OUTPATIENT)
Dept: ORTHOPEDIC SURGERY | Facility: CLINIC | Age: 54
End: 2019-10-14
Payer: COMMERCIAL

## 2019-10-14 PROCEDURE — 99214 OFFICE O/P EST MOD 30 MIN: CPT

## 2019-10-23 ENCOUNTER — APPOINTMENT (OUTPATIENT)
Dept: ORTHOPEDIC SURGERY | Facility: CLINIC | Age: 54
End: 2019-10-23

## 2019-12-10 ENCOUNTER — APPOINTMENT (OUTPATIENT)
Dept: NEUROSURGERY | Facility: CLINIC | Age: 54
End: 2019-12-10

## 2019-12-17 ENCOUNTER — APPOINTMENT (OUTPATIENT)
Dept: ORTHOPEDIC SURGERY | Facility: CLINIC | Age: 54
End: 2019-12-17
Payer: COMMERCIAL

## 2019-12-17 DIAGNOSIS — M75.122 COMPLETE ROTATOR CUFF TEAR OR RUPTURE OF LEFT SHOULDER, NOT SPECIFIED AS TRAUMATIC: ICD-10-CM

## 2019-12-17 DIAGNOSIS — M75.121 COMPLETE ROTATOR CUFF TEAR OR RUPTURE OF RIGHT SHOULDER, NOT SPECIFIED AS TRAUMATIC: ICD-10-CM

## 2019-12-17 PROCEDURE — 99213 OFFICE O/P EST LOW 20 MIN: CPT

## 2019-12-17 NOTE — ADDENDUM
[FreeTextEntry1] : This note was written by Deisi Veras on 12/17/2019 acting solely as a scribe for Dr. Garry Tony.\par \par All medical record entries made by the Scribe were at my, Dr. Garry Tony, direction and personally dictated by me on 12/17/2019. I have personally reviewed the chart and agree that the record accurately reflects my personal performance of the history, physical exam, assessment and plan.\par

## 2019-12-17 NOTE — PHYSICAL EXAM
[de-identified] : Oriented to time, place, person\par Mood: Normal\par Affect: Normal\par Appearance: Healthy, well appearing, no acute distress.\par Gait: Normal\par Assistive Devices: None\par \par Right shoulder exam\par \par Inspection: No swelling\par Skin: Incisions C/D/I, no drainage, healed\par AROM: , ABD 90, ER 45, IR mid thoracic\par Painful arc ROM: none\par Tenderness: Min to biceps\par Strength: 4.5/5 ER, 5/5 IR, 4/5 FF\par Vasc: 2+ radial pulse\par Neuro: AIN, PIN, Ulnar nerve in tact to motor\par Sensation: In tact to light touch throughout

## 2019-12-17 NOTE — HISTORY OF PRESENT ILLNESS
[de-identified] : 54-year-old female status post right shoulder arthroscopic SAD, biceps tenotomy, superior capsular reconstruction 4.23.19. She is happy with progress and states that feeling a lot better than prior to surgery. She is attending PT 3 x per week for her spine and they add shoulder exercises. Reports continuation of weakness in the arm, and pain to the biceps. Denies other post op complications. No current problems/pain with the left shoulder.

## 2019-12-17 NOTE — DISCUSSION/SUMMARY
[de-identified] : 54-year-old female status post right shoulder arthroscopic SAD, biceps tenotomy, superior capsular reconstruction\par \par Patient has made exceptional progress with range of motion and function to the right shoulder. I again discussed with the patient that biceps tenotomy was necessary for improvement of shoulder function, and patient is developing some cramping of the long head of the biceps tendon in the myotendinous junction. I discussed that this will be self-limiting, and ultimately resolve, with no functional deficit to the right upper extremity. Patient voiced understanding and appreciation.\par \par Continue PT for lumbar discomfort. Continue activity restriction with weightbearing restrictions to the right upper extremity until one year postop.\par \par Follow up as needed.

## 2019-12-20 NOTE — ASU PATIENT PROFILE, ADULT - NS PRO LACT YNNA
12/19/19 2350   C-SSRS (Frequent Screen)   2. Have you actually had any thoughts of killing yourself? No   6. Have you done anything, started to do anything, or prepared to do anything to end your life? No   Suicide Evaluation Negative screen= no ideation, behaviors or history     Nursing Suicide Assessment Note - Inpatient    Current assessment:    Current C-SSRS score: (P) Negative screen= no ideation, behaviors or history      Protective Factors / Reason for Living: Ability to cope with frustration    Interventions:   · Implemented the Safety / Recovery Plan  · Maintain 15 minute safety checks    Other Interventions Implemented:  · Visual inspection of patient's environment completed. Items removed: none   no

## 2019-12-23 ENCOUNTER — APPOINTMENT (OUTPATIENT)
Dept: ORTHOPEDIC SURGERY | Facility: CLINIC | Age: 54
End: 2019-12-23
Payer: COMMERCIAL

## 2019-12-23 DIAGNOSIS — M50.20 OTHER CERVICAL DISC DISPLACEMENT, UNSPECIFIED CERVICAL REGION: ICD-10-CM

## 2019-12-23 DIAGNOSIS — M51.26 OTHER INTERVERTEBRAL DISC DISPLACEMENT, LUMBAR REGION: ICD-10-CM

## 2019-12-23 PROCEDURE — 99214 OFFICE O/P EST MOD 30 MIN: CPT

## 2020-04-22 NOTE — H&P PST ADULT - NEGATIVE CARDIOVASCULAR SYMPTOMS
, insulin not given due to NPO status. no dyspnea on exertion/no palpitations/no chest pain/no paroxysmal nocturnal dyspnea/no peripheral edema/no claudication

## 2020-05-21 NOTE — ASU PATIENT PROFILE, ADULT - FALL HARM RISK TYPE OF ASSESSMENT
Clinic Care Coordination Contact    Clinical Data: Care Coordinator Outreach  Outreach attempted - per chart review, patient is currently admitted at [facility].     Plan: Care Coordinator will watch the chart for discharge to do an outreach.     MARIE Price  Clinic Care Coordination  [facility], [place], and [facility]  Phone: [phone]      Admission

## 2021-01-13 NOTE — H&P PST ADULT - RESPIRATORY AND THORAX
DATE OF SERVICE:  01/12/2021

 

The patient has been afebrile with stable vital signs.  __________ improved today, and she

appears to be diuresing.  We gave her some additional Lasix this morning.  Given her

underlying cardiopulmonary status and Pablo catheter, I will back down on IV rate, go on

regular diet, and continue to maximize activity and work with pulmonary toilet __________

drains are putting out relatively small amounts at this point, and we will continue the

present antibiotics.

 

 

 

 

Arian August MD

DD:  01/12/2021 06:20:53

DT:  01/13/2021 20:09:54

Job #:  [id number] details…

## 2021-03-08 ENCOUNTER — OUTPATIENT (OUTPATIENT)
Dept: OUTPATIENT SERVICES | Facility: HOSPITAL | Age: 56
LOS: 1 days | End: 2021-03-08
Payer: MEDICARE

## 2021-03-08 VITALS
OXYGEN SATURATION: 99 % | WEIGHT: 116.84 LBS | DIASTOLIC BLOOD PRESSURE: 81 MMHG | RESPIRATION RATE: 18 BRPM | HEIGHT: 61 IN | HEART RATE: 88 BPM | SYSTOLIC BLOOD PRESSURE: 145 MMHG | TEMPERATURE: 98 F

## 2021-03-08 DIAGNOSIS — Z98.890 OTHER SPECIFIED POSTPROCEDURAL STATES: Chronic | ICD-10-CM

## 2021-03-08 DIAGNOSIS — U07.1 COVID-19: ICD-10-CM

## 2021-03-08 DIAGNOSIS — Z90.710 ACQUIRED ABSENCE OF BOTH CERVIX AND UTERUS: Chronic | ICD-10-CM

## 2021-03-08 DIAGNOSIS — E11.9 TYPE 2 DIABETES MELLITUS WITHOUT COMPLICATIONS: ICD-10-CM

## 2021-03-08 DIAGNOSIS — Z98.51 TUBAL LIGATION STATUS: Chronic | ICD-10-CM

## 2021-03-08 DIAGNOSIS — F41.9 ANXIETY DISORDER, UNSPECIFIED: ICD-10-CM

## 2021-03-08 DIAGNOSIS — N39.3 STRESS INCONTINENCE (FEMALE) (MALE): Chronic | ICD-10-CM

## 2021-03-08 DIAGNOSIS — Z98.89 OTHER SPECIFIED POSTPROCEDURAL STATES: Chronic | ICD-10-CM

## 2021-03-08 DIAGNOSIS — N31.8 OTHER NEUROMUSCULAR DYSFUNCTION OF BLADDER: Chronic | ICD-10-CM

## 2021-03-08 DIAGNOSIS — E78.5 HYPERLIPIDEMIA, UNSPECIFIED: ICD-10-CM

## 2021-03-08 DIAGNOSIS — I10 ESSENTIAL (PRIMARY) HYPERTENSION: ICD-10-CM

## 2021-03-08 DIAGNOSIS — Z01.818 ENCOUNTER FOR OTHER PREPROCEDURAL EXAMINATION: ICD-10-CM

## 2021-03-08 DIAGNOSIS — S62.102A FRACTURE OF UNSPECIFIED CARPAL BONE, LEFT WRIST, INITIAL ENCOUNTER FOR CLOSED FRACTURE: Chronic | ICD-10-CM

## 2021-03-08 DIAGNOSIS — J34.3 HYPERTROPHY OF NASAL TURBINATES: ICD-10-CM

## 2021-03-08 LAB
ANION GAP SERPL CALC-SCNC: 8 MMOL/L — SIGNIFICANT CHANGE UP (ref 5–17)
APTT BLD: 31.6 SEC — SIGNIFICANT CHANGE UP (ref 27.5–35.5)
BUN SERPL-MCNC: 22 MG/DL — SIGNIFICANT CHANGE UP (ref 7–23)
CALCIUM SERPL-MCNC: 9.6 MG/DL — SIGNIFICANT CHANGE UP (ref 8.4–10.5)
CHLORIDE SERPL-SCNC: 104 MMOL/L — SIGNIFICANT CHANGE UP (ref 96–108)
CO2 SERPL-SCNC: 30 MMOL/L — SIGNIFICANT CHANGE UP (ref 22–31)
CREAT SERPL-MCNC: 0.52 MG/DL — SIGNIFICANT CHANGE UP (ref 0.5–1.3)
GLUCOSE SERPL-MCNC: 105 MG/DL — HIGH (ref 70–99)
HCT VFR BLD CALC: 39.6 % — SIGNIFICANT CHANGE UP (ref 34.5–45)
HGB BLD-MCNC: 13.3 G/DL — SIGNIFICANT CHANGE UP (ref 11.5–15.5)
INR BLD: 0.96 RATIO — SIGNIFICANT CHANGE UP (ref 0.88–1.16)
MCHC RBC-ENTMCNC: 32.2 PG — SIGNIFICANT CHANGE UP (ref 27–34)
MCHC RBC-ENTMCNC: 33.6 GM/DL — SIGNIFICANT CHANGE UP (ref 32–36)
MCV RBC AUTO: 95.9 FL — SIGNIFICANT CHANGE UP (ref 80–100)
NRBC # BLD: 0 /100 WBCS — SIGNIFICANT CHANGE UP (ref 0–0)
PLATELET # BLD AUTO: 234 K/UL — SIGNIFICANT CHANGE UP (ref 150–400)
POTASSIUM SERPL-MCNC: 4.5 MMOL/L — SIGNIFICANT CHANGE UP (ref 3.5–5.3)
POTASSIUM SERPL-SCNC: 4.5 MMOL/L — SIGNIFICANT CHANGE UP (ref 3.5–5.3)
PROTHROM AB SERPL-ACNC: 11.6 SEC — SIGNIFICANT CHANGE UP (ref 10.6–13.6)
RBC # BLD: 4.13 M/UL — SIGNIFICANT CHANGE UP (ref 3.8–5.2)
RBC # FLD: 12.7 % — SIGNIFICANT CHANGE UP (ref 10.3–14.5)
SODIUM SERPL-SCNC: 142 MMOL/L — SIGNIFICANT CHANGE UP (ref 135–145)
WBC # BLD: 4.06 K/UL — SIGNIFICANT CHANGE UP (ref 3.8–10.5)
WBC # FLD AUTO: 4.06 K/UL — SIGNIFICANT CHANGE UP (ref 3.8–10.5)

## 2021-03-08 PROCEDURE — 93005 ELECTROCARDIOGRAM TRACING: CPT

## 2021-03-08 PROCEDURE — 93010 ELECTROCARDIOGRAM REPORT: CPT | Mod: NC

## 2021-03-08 PROCEDURE — 71046 X-RAY EXAM CHEST 2 VIEWS: CPT

## 2021-03-08 PROCEDURE — 85027 COMPLETE CBC AUTOMATED: CPT

## 2021-03-08 PROCEDURE — 36415 COLL VENOUS BLD VENIPUNCTURE: CPT

## 2021-03-08 PROCEDURE — 85730 THROMBOPLASTIN TIME PARTIAL: CPT

## 2021-03-08 PROCEDURE — 71046 X-RAY EXAM CHEST 2 VIEWS: CPT | Mod: 26

## 2021-03-08 PROCEDURE — 85610 PROTHROMBIN TIME: CPT

## 2021-03-08 PROCEDURE — G0463: CPT

## 2021-03-08 PROCEDURE — 80048 BASIC METABOLIC PNL TOTAL CA: CPT

## 2021-03-08 RX ORDER — POLYETHYLENE GLYCOL 3350 17 G/17G
17 POWDER, FOR SOLUTION ORAL
Qty: 0 | Refills: 0 | DISCHARGE

## 2021-03-08 NOTE — H&P PST ADULT - NSANTHOSAYNRD_GEN_A_CORE
neck 12.75 inches/No. CAROL screening performed.  STOP BANG Legend: 0-2 = LOW Risk; 3-4 = INTERMEDIATE Risk; 5-8 = HIGH Risk

## 2021-03-08 NOTE — H&P PST ADULT - NSICDXPROBLEM_GEN_ALL_CORE_FT
PROBLEM DIAGNOSES  Problem: Disorder of shoulder  Assessment and Plan: Right Shoulder Major Debridement Subacromial Decompression Arthroscopy Rotator Cuff Repair , Biceps Tenotomy , Possible Superior Capsular Reconstruction  Pre op instructions reviewed with pt including Hibiclens ; pt appears to have a good understanding of pre op instructions  Pt to Dr Mccord for pre op medical evaluation   medications as per pt ; pt does not take medications as prescribed     Problem: Diabetes mellitus  Assessment and Plan: BMP,HGBA1C done 4/08/19  Pt to hold metformin day of surgery  FS dos    Problem: GERD (gastroesophageal reflux disease)  Assessment and Plan: Pt to take Zegerid dos     Problem: Asthma  Assessment and Plan: Pt to use ProAir dos if needed ;pt to bring dos     Problem: Hypertension  Assessment and Plan: Pt to take metoprolol dos    Problem: CAROL (obstructive sleep apnea)  Assessment and Plan: CAROL Precautions  OR booking notified via fax PROBLEM DIAGNOSES  Problem: Hypertrophy of nasal turbinates  Assessment and Plan: Scheduled for bilateral inferior turbinate cautery with Dr madrid on 03/17/2021.  Pre op instructions given and patient verbalized understanding.  CBC, BMP, PT/PTT/INR, COVID-19, EKG chest xray and medical clearance pending.      Problem: Anxiety  Assessment and Plan: Takes medications as prescribed     Problem: COVID-19  Assessment and Plan: Chest x ray     Problem: Hyperlipidemia  Assessment and Plan: Takes medications as prescribed     Problem: Hypertension  Assessment and Plan: takes medications as prescribed     Problem: Diabetes mellitus  Assessment and Plan: POC AM of procedure.  Instructed not to take PO diabetic medication morning of procedure

## 2021-03-08 NOTE — H&P PST ADULT - WEIGHT IN KG
June 11, 2019    Schuyler Diaz  [address]  [facility] [address]        Dear Efraín,    Please provide a new Respironics by [employer] Nebulizer with a \"Angeled\" mouth piece as hers has broken. The item number was [id number].  Sincerely,    Electronically signed    Dallas Hartley MD                                                
53

## 2021-03-08 NOTE — H&P PST ADULT - NSICDXPASTSURGICALHX_GEN_ALL_CORE_FT
PAST SURGICAL HISTORY:  Female stress incontinence sling procedure ; revision x 2 last     H/O abdominal hysterectomy     H/O arthroscopic knee surgery left    H/O arthroscopy Right Shoulder    H/O bilateral inguinal hernia repair     H/O carpal tunnel repair RIGHT Hand 2017    H/O cervical polypectomy     H/O colonoscopy with polypectomy benign    H/O colposcopy with cervical biopsy     H/O tubal ligation     H/O:      History of D&C     Left wrist fracture 10 years ago with repair and placement of pins     (normal spontaneous vaginal delivery)     Other functional disorder of bladder bladder suspension for incontience    S/P carpal tunnel release left; and left middle trigger finger release; 17    S/P trigger finger release RIGHT Middle Finger 2017

## 2021-03-08 NOTE — H&P PST ADULT - HISTORY OF PRESENT ILLNESS
57 y/o female with PMH of DM, hypothyroidism, hypertension, fibromyalgia, anemia, asthma COVID-19 (3/202), and anxiety presents for PST.  Scheduled for bilateral inferior turbinate cautery on 3/17/2021 with Dr Davis.  COVID-19 TBS - information provided

## 2021-03-08 NOTE — H&P PST ADULT - NSICDXPASTMEDICALHX_GEN_ALL_CORE_FT
PAST MEDICAL HISTORY:  Anemia     Anxiety     Arthritis     Asthma with allergic rhinitis     Asthma, stable     Carpal tunnel syndrome, left upper limb     Carpal tunnel syndrome, right upper limb     COVID-19 HX 3/2020,- NOT  hospitalized,    Depression controlled with meds    Dyslipidemia     Fibroids uterine    Fibromyalgia     Foot fracture, right s/p fall on 9/3/2014, no surgery recommended,, foot brace maintained f/u with Dr. Ld Jay (orthopedic)    Gastroparesis     GERD (gastroesophageal reflux disease)     H/O diabetic neuropathy     Hypothyroid     Left knee pain pt reports bilateral knee pain    Ovarian cyst left    Prediabetes     Stress incontinence in female     Thyroid nodule s/p biopsy (benign)    Trigger finger, left middle finger     Trigger finger, right middle finger     Type 2 diabetes mellitus

## 2021-03-16 ENCOUNTER — TRANSCRIPTION ENCOUNTER (OUTPATIENT)
Age: 56
End: 2021-03-16

## 2021-03-17 ENCOUNTER — OUTPATIENT (OUTPATIENT)
Dept: OUTPATIENT SERVICES | Facility: HOSPITAL | Age: 56
LOS: 1 days | End: 2021-03-17
Payer: MEDICARE

## 2021-03-17 VITALS
RESPIRATION RATE: 16 BRPM | DIASTOLIC BLOOD PRESSURE: 62 MMHG | SYSTOLIC BLOOD PRESSURE: 104 MMHG | OXYGEN SATURATION: 98 % | HEART RATE: 70 BPM | TEMPERATURE: 98 F

## 2021-03-17 VITALS
DIASTOLIC BLOOD PRESSURE: 64 MMHG | RESPIRATION RATE: 16 BRPM | OXYGEN SATURATION: 100 % | HEART RATE: 71 BPM | SYSTOLIC BLOOD PRESSURE: 105 MMHG | WEIGHT: 116.84 LBS | HEIGHT: 61 IN | TEMPERATURE: 98 F

## 2021-03-17 DIAGNOSIS — Z98.890 OTHER SPECIFIED POSTPROCEDURAL STATES: Chronic | ICD-10-CM

## 2021-03-17 DIAGNOSIS — Z98.89 OTHER SPECIFIED POSTPROCEDURAL STATES: Chronic | ICD-10-CM

## 2021-03-17 DIAGNOSIS — N39.3 STRESS INCONTINENCE (FEMALE) (MALE): Chronic | ICD-10-CM

## 2021-03-17 DIAGNOSIS — S62.102A FRACTURE OF UNSPECIFIED CARPAL BONE, LEFT WRIST, INITIAL ENCOUNTER FOR CLOSED FRACTURE: Chronic | ICD-10-CM

## 2021-03-17 DIAGNOSIS — Z90.710 ACQUIRED ABSENCE OF BOTH CERVIX AND UTERUS: Chronic | ICD-10-CM

## 2021-03-17 DIAGNOSIS — N31.8 OTHER NEUROMUSCULAR DYSFUNCTION OF BLADDER: Chronic | ICD-10-CM

## 2021-03-17 DIAGNOSIS — Z98.51 TUBAL LIGATION STATUS: Chronic | ICD-10-CM

## 2021-03-17 DIAGNOSIS — J34.3 HYPERTROPHY OF NASAL TURBINATES: ICD-10-CM

## 2021-03-17 LAB — GLUCOSE BLDC GLUCOMTR-MCNC: 81 MG/DL — SIGNIFICANT CHANGE UP (ref 70–99)

## 2021-03-17 PROCEDURE — 82962 GLUCOSE BLOOD TEST: CPT

## 2021-03-17 PROCEDURE — 30801 ABLATE INF TURBINATE SUPERF: CPT

## 2021-03-17 RX ORDER — METOPROLOL TARTRATE 50 MG
1 TABLET ORAL
Qty: 0 | Refills: 0 | DISCHARGE

## 2021-03-17 RX ORDER — NORTRIPTYLINE HYDROCHLORIDE 10 MG/1
1 CAPSULE ORAL
Qty: 0 | Refills: 0 | DISCHARGE

## 2021-03-17 RX ORDER — CHOLECALCIFEROL (VITAMIN D3) 125 MCG
1 CAPSULE ORAL
Qty: 0 | Refills: 0 | DISCHARGE

## 2021-03-17 RX ORDER — GABAPENTIN 400 MG/1
1 CAPSULE ORAL
Qty: 0 | Refills: 0 | DISCHARGE

## 2021-03-17 RX ORDER — ACETAMINOPHEN 500 MG
650 TABLET ORAL EVERY 6 HOURS
Refills: 0 | Status: DISCONTINUED | OUTPATIENT
Start: 2021-03-17 | End: 2021-03-31

## 2021-03-17 RX ORDER — CYCLOBENZAPRINE HYDROCHLORIDE 10 MG/1
0 TABLET, FILM COATED ORAL
Qty: 0 | Refills: 0 | DISCHARGE

## 2021-03-17 RX ORDER — ALBUTEROL 90 UG/1
2 AEROSOL, METERED ORAL
Qty: 0 | Refills: 0 | DISCHARGE

## 2021-03-17 RX ORDER — ONDANSETRON 8 MG/1
4 TABLET, FILM COATED ORAL ONCE
Refills: 0 | Status: DISCONTINUED | OUTPATIENT
Start: 2021-03-17 | End: 2021-03-17

## 2021-03-17 RX ORDER — OMEGA-3 ACID ETHYL ESTERS 1 G
1 CAPSULE ORAL
Qty: 0 | Refills: 0 | DISCHARGE

## 2021-03-17 RX ORDER — OMEPRAZOLE AND SODIUM BICARBONATE 40; 1100 MG/1; MG/1
1 CAPSULE, GELATIN COATED ORAL
Qty: 0 | Refills: 0 | DISCHARGE

## 2021-03-17 RX ORDER — HYDROMORPHONE HYDROCHLORIDE 2 MG/ML
0.5 INJECTION INTRAMUSCULAR; INTRAVENOUS; SUBCUTANEOUS
Refills: 0 | Status: DISCONTINUED | OUTPATIENT
Start: 2021-03-17 | End: 2021-03-17

## 2021-03-17 RX ORDER — MILK THISTLE 150 MG
1 CAPSULE ORAL
Qty: 0 | Refills: 0 | DISCHARGE

## 2021-03-17 RX ORDER — ALPRAZOLAM 0.25 MG
1 TABLET ORAL
Qty: 0 | Refills: 0 | DISCHARGE

## 2021-03-17 RX ORDER — METFORMIN HYDROCHLORIDE 850 MG/1
1 TABLET ORAL
Qty: 0 | Refills: 0 | DISCHARGE

## 2021-03-17 RX ORDER — LEVOTHYROXINE SODIUM 125 MCG
1 TABLET ORAL
Qty: 0 | Refills: 0 | DISCHARGE

## 2021-03-17 RX ORDER — SODIUM CHLORIDE 9 MG/ML
1000 INJECTION, SOLUTION INTRAVENOUS
Refills: 0 | Status: DISCONTINUED | OUTPATIENT
Start: 2021-03-17 | End: 2021-03-17

## 2021-03-17 RX ORDER — ATORVASTATIN CALCIUM 80 MG/1
0 TABLET, FILM COATED ORAL
Qty: 0 | Refills: 0 | DISCHARGE

## 2021-03-17 RX ORDER — MELOXICAM 15 MG/1
1 TABLET ORAL
Qty: 0 | Refills: 0 | DISCHARGE

## 2021-03-17 RX ORDER — ASCORBIC ACID 60 MG
1 TABLET,CHEWABLE ORAL
Qty: 0 | Refills: 0 | DISCHARGE

## 2021-03-17 RX ADMIN — SODIUM CHLORIDE 50 MILLILITER(S): 9 INJECTION, SOLUTION INTRAVENOUS at 12:21

## 2021-03-17 NOTE — ASU DISCHARGE PLAN (ADULT/PEDIATRIC) - CALL YOUR DOCTOR IF YOU HAVE ANY OF THE FOLLOWING:
Bleeding that does not stop/Pain not relieved by Medications/Fever greater than (need to indicate Fahrenheit or Celsius)/Wound/Surgical Site with redness, or foul smelling discharge or pus/Nausea and vomiting that does not stop/Unable to urinate/Inability to tolerate liquids or foods

## 2021-03-17 NOTE — ASU DISCHARGE PLAN (ADULT/PEDIATRIC) - CARE PROVIDER_API CALL
Lexii Davis  OTOLARYNGOLOGY  [address] [place] [id number]  Phone: [phone]  Fax: [phone]  Follow Up Time:

## 2021-03-17 NOTE — ASU PATIENT PROFILE, ADULT - PSH
Female stress incontinence  sling procedure ; revision x 2 last   H/O abdominal hysterectomy    H/O arthroscopic knee surgery  left  H/O arthroscopy  Right Shoulder  H/O bilateral inguinal hernia repair    H/O carpal tunnel repair  RIGHT Hand 2017  H/O cervical polypectomy    H/O colonoscopy with polypectomy  benign  H/O colposcopy with cervical biopsy    H/O tubal ligation    H/O:     History of D&C    Left wrist fracture  10 years ago with repair and placement of pins   (normal spontaneous vaginal delivery)    Other functional disorder of bladder  bladder suspension for incontience  S/P carpal tunnel release  left; and left middle trigger finger release; 17  S/P trigger finger release  RIGHT Middle Finger 2017

## 2021-03-17 NOTE — ASU DISCHARGE PLAN (ADULT/PEDIATRIC) - ASU DC SPECIAL INSTRUCTIONSFT
Eat a light dinner tonight; you may advance to your regular diet tomorrow as tolerated.  Tylenol (325mg) 2 tabs by mouth every 6 hours as needed for pain.  Avoid NSAIDs (Mobic, Alleve, Motrin, Ibuprofen) and aspirin products x 48 hours.  Follow-up Dr. Davis in 2 weeks; please call office for appointment.

## 2021-03-17 NOTE — BRIEF OPERATIVE NOTE - NSICDXBRIEFPROCEDURE_GEN_ALL_CORE_FT
PROCEDURES:  Complex nasal cautery 17-Mar-2021 13:18:12 inferior turbinate cautery Quin Hendrickson

## 2021-03-17 NOTE — ASU PATIENT PROFILE, ADULT - PMH
Anemia    Anxiety    Arthritis    Asthma with allergic rhinitis    Asthma, stable    Carpal tunnel syndrome, left upper limb    Carpal tunnel syndrome, right upper limb    COVID-19  HX 3/2020,- NOT  hospitalized,  Depression  controlled with meds  Dyslipidemia    Fibroids  uterine  Fibromyalgia    Foot fracture, right  s/p fall on 9/3/2014, no surgery recommended,, foot brace maintained f/u with Dr. Ld Jay (orthopedic)  Gastroparesis    GERD (gastroesophageal reflux disease)    H/O diabetic neuropathy    Hypothyroid    Left knee pain  pt reports bilateral knee pain  Ovarian cyst  left  Prediabetes    Stress incontinence in female    Thyroid nodule  s/p biopsy (benign)  Trigger finger, left middle finger    Trigger finger, right middle finger    Type 2 diabetes mellitus

## 2021-03-24 NOTE — H&P PST ADULT - LAST STRESS TEST
[denies

## 2021-05-21 NOTE — H&P PST ADULT - TEACHING/LEARNING FACTORS IMPACT ABILITY TO LEARN
I personally performed the service described in the documentation recorded by the scribe in my presence, and it accurately and completely records my words and actions. none

## 2021-06-16 PROBLEM — U07.1 COVID-19: Chronic | Status: ACTIVE | Noted: 2021-03-08

## 2021-07-02 ENCOUNTER — APPOINTMENT (OUTPATIENT)
Dept: SURGERY | Facility: CLINIC | Age: 56
End: 2021-07-02

## 2021-11-11 NOTE — H&P PST ADULT - EXTREMITIES
Pre-procedure indication: Patient continues to have pain after trials of physical therapy and non steriodal and/or steriod therapy and remains symptomatic.  Since patient has not responded to conservative therapy and with radiological and clinical evidence of pain pathology will proceed with inteventional treatment.    Preprocedure Dx:  Lumbar radiculopathy    Post-procedure Dx.  Lumbar radiculopathy    Procedure performed:  L2-3 epidural steroid injection    Anesthesia: Local    Description of procedure: After informed consent was obtained and all the risks, benefits, alternatives were discussed with the patient. Patient was brought to the procedure room. Patient was placed in the prone position. Appropriate time out was called. The area was prepped and draped in the usual sterile manner. Utilizing fluoroscopy the landmarks were identified. Then using a 25-gauge 1-1/2 inch needle and 1% lidocaine skin wheal was raised over the entry point. Then using an 18-gauge 3-1/2 inch Tuohy needle was inserted and advanced under intermittent fluoroscopic evaluation with a loss-of-resistance technique. Upon entering the epidural space the needle was aspirated negative for heme and CSF.     Then utilizing Omnipaque M300 contrast dye total volume of 0.5 mL was injected. This showed posterior epidural spread with no intrathecal uptake in the AP, lateral oblique, lateral views.   After proper confirmation of epidural spread this was followed by an injection of 2 mL of 0.2% Ropivicaine and 80 mg of Depo-Medrol. After injection the needle was removed . The area was dressed and clean. The patient was taken to the post procedure no immediate complication the patient tolerated procedure well.    Contrast: 2 ml's were drawn and 8 ml's were wasted    
detailed exam

## 2022-02-15 ENCOUNTER — APPOINTMENT (OUTPATIENT)
Dept: RADIOLOGY | Facility: HOSPITAL | Age: 57
End: 2022-02-15
Payer: MEDICARE

## 2022-02-15 ENCOUNTER — OUTPATIENT (OUTPATIENT)
Dept: OUTPATIENT SERVICES | Facility: HOSPITAL | Age: 57
LOS: 1 days | End: 2022-02-15
Payer: MEDICARE

## 2022-02-15 DIAGNOSIS — Z98.89 OTHER SPECIFIED POSTPROCEDURAL STATES: Chronic | ICD-10-CM

## 2022-02-15 DIAGNOSIS — Z98.890 OTHER SPECIFIED POSTPROCEDURAL STATES: Chronic | ICD-10-CM

## 2022-02-15 DIAGNOSIS — R10.13 EPIGASTRIC PAIN: ICD-10-CM

## 2022-02-15 DIAGNOSIS — N39.3 STRESS INCONTINENCE (FEMALE) (MALE): Chronic | ICD-10-CM

## 2022-02-15 DIAGNOSIS — Z98.51 TUBAL LIGATION STATUS: Chronic | ICD-10-CM

## 2022-02-15 DIAGNOSIS — N31.8 OTHER NEUROMUSCULAR DYSFUNCTION OF BLADDER: Chronic | ICD-10-CM

## 2022-02-15 DIAGNOSIS — R13.10 DYSPHAGIA, UNSPECIFIED: ICD-10-CM

## 2022-02-15 DIAGNOSIS — Z00.00 ENCOUNTER FOR GENERAL ADULT MEDICAL EXAMINATION WITHOUT ABNORMAL FINDINGS: ICD-10-CM

## 2022-02-15 DIAGNOSIS — S62.102A FRACTURE OF UNSPECIFIED CARPAL BONE, LEFT WRIST, INITIAL ENCOUNTER FOR CLOSED FRACTURE: Chronic | ICD-10-CM

## 2022-02-15 DIAGNOSIS — K22.0 ACHALASIA OF CARDIA: ICD-10-CM

## 2022-02-15 DIAGNOSIS — Z90.710 ACQUIRED ABSENCE OF BOTH CERVIX AND UTERUS: Chronic | ICD-10-CM

## 2022-02-15 PROCEDURE — 74221 X-RAY XM ESOPHAGUS 2CNTRST: CPT | Mod: 26

## 2022-02-15 PROCEDURE — 74221 X-RAY XM ESOPHAGUS 2CNTRST: CPT

## 2022-12-29 ENCOUNTER — EMERGENCY (EMERGENCY)
Facility: HOSPITAL | Age: 57
LOS: 1 days | Discharge: HOME | End: 2022-12-29
Attending: EMERGENCY MEDICINE | Admitting: EMERGENCY MEDICINE
Payer: MEDICARE

## 2022-12-29 VITALS
HEIGHT: 60 IN | TEMPERATURE: 100 F | HEART RATE: 123 BPM | SYSTOLIC BLOOD PRESSURE: 128 MMHG | RESPIRATION RATE: 21 BRPM | DIASTOLIC BLOOD PRESSURE: 61 MMHG | WEIGHT: 121.92 LBS | OXYGEN SATURATION: 96 %

## 2022-12-29 DIAGNOSIS — Z98.89 OTHER SPECIFIED POSTPROCEDURAL STATES: Chronic | ICD-10-CM

## 2022-12-29 DIAGNOSIS — Z98.890 OTHER SPECIFIED POSTPROCEDURAL STATES: Chronic | ICD-10-CM

## 2022-12-29 DIAGNOSIS — S62.102A FRACTURE OF UNSPECIFIED CARPAL BONE, LEFT WRIST, INITIAL ENCOUNTER FOR CLOSED FRACTURE: Chronic | ICD-10-CM

## 2022-12-29 DIAGNOSIS — N31.8 OTHER NEUROMUSCULAR DYSFUNCTION OF BLADDER: Chronic | ICD-10-CM

## 2022-12-29 DIAGNOSIS — Z98.51 TUBAL LIGATION STATUS: Chronic | ICD-10-CM

## 2022-12-29 DIAGNOSIS — Z90.710 ACQUIRED ABSENCE OF BOTH CERVIX AND UTERUS: Chronic | ICD-10-CM

## 2022-12-29 DIAGNOSIS — N39.3 STRESS INCONTINENCE (FEMALE) (MALE): Chronic | ICD-10-CM

## 2022-12-29 LAB
ANION GAP SERPL CALC-SCNC: 8 MMOL/L — SIGNIFICANT CHANGE UP (ref 5–17)
BASOPHILS # BLD AUTO: 0.01 K/UL — SIGNIFICANT CHANGE UP (ref 0–0.2)
BASOPHILS NFR BLD AUTO: 0.2 % — SIGNIFICANT CHANGE UP (ref 0–2)
BUN SERPL-MCNC: 10 MG/DL — SIGNIFICANT CHANGE UP (ref 7–23)
CALCIUM SERPL-MCNC: 8.7 MG/DL — SIGNIFICANT CHANGE UP (ref 8.4–10.5)
CHLORIDE SERPL-SCNC: 100 MMOL/L — SIGNIFICANT CHANGE UP (ref 96–108)
CO2 SERPL-SCNC: 25 MMOL/L — SIGNIFICANT CHANGE UP (ref 22–31)
CREAT SERPL-MCNC: 0.6 MG/DL — SIGNIFICANT CHANGE UP (ref 0.5–1.3)
EGFR: 104 ML/MIN/1.73M2 — SIGNIFICANT CHANGE UP
EOSINOPHIL # BLD AUTO: 0 K/UL — SIGNIFICANT CHANGE UP (ref 0–0.5)
EOSINOPHIL NFR BLD AUTO: 0 % — SIGNIFICANT CHANGE UP (ref 0–6)
FLUAV AG NPH QL: DETECTED
FLUBV AG NPH QL: SIGNIFICANT CHANGE UP
GLUCOSE SERPL-MCNC: 122 MG/DL — HIGH (ref 70–99)
HCT VFR BLD CALC: 34.7 % — SIGNIFICANT CHANGE UP (ref 34.5–45)
HGB BLD-MCNC: 11.8 G/DL — SIGNIFICANT CHANGE UP (ref 11.5–15.5)
IMM GRANULOCYTES NFR BLD AUTO: 0.4 % — SIGNIFICANT CHANGE UP (ref 0–0.9)
LYMPHOCYTES # BLD AUTO: 0.32 K/UL — LOW (ref 1–3.3)
LYMPHOCYTES # BLD AUTO: 5.7 % — LOW (ref 13–44)
MCHC RBC-ENTMCNC: 31.6 PG — SIGNIFICANT CHANGE UP (ref 27–34)
MCHC RBC-ENTMCNC: 34 GM/DL — SIGNIFICANT CHANGE UP (ref 32–36)
MCV RBC AUTO: 92.8 FL — SIGNIFICANT CHANGE UP (ref 80–100)
MONOCYTES # BLD AUTO: 0.46 K/UL — SIGNIFICANT CHANGE UP (ref 0–0.9)
MONOCYTES NFR BLD AUTO: 8.1 % — SIGNIFICANT CHANGE UP (ref 2–14)
NEUTROPHILS # BLD AUTO: 4.84 K/UL — SIGNIFICANT CHANGE UP (ref 1.8–7.4)
NEUTROPHILS NFR BLD AUTO: 85.6 % — HIGH (ref 43–77)
NRBC # BLD: 0 /100 WBCS — SIGNIFICANT CHANGE UP (ref 0–0)
PLATELET # BLD AUTO: 178 K/UL — SIGNIFICANT CHANGE UP (ref 150–400)
POTASSIUM SERPL-MCNC: 3.4 MMOL/L — LOW (ref 3.5–5.3)
POTASSIUM SERPL-SCNC: 3.4 MMOL/L — LOW (ref 3.5–5.3)
RBC # BLD: 3.74 M/UL — LOW (ref 3.8–5.2)
RBC # FLD: 13.8 % — SIGNIFICANT CHANGE UP (ref 10.3–14.5)
RSV RNA NPH QL NAA+NON-PROBE: SIGNIFICANT CHANGE UP
SARS-COV-2 RNA SPEC QL NAA+PROBE: SIGNIFICANT CHANGE UP
SODIUM SERPL-SCNC: 133 MMOL/L — LOW (ref 135–145)
WBC # BLD: 5.65 K/UL — SIGNIFICANT CHANGE UP (ref 3.8–10.5)
WBC # FLD AUTO: 5.65 K/UL — SIGNIFICANT CHANGE UP (ref 3.8–10.5)

## 2022-12-29 PROCEDURE — 80048 BASIC METABOLIC PNL TOTAL CA: CPT

## 2022-12-29 PROCEDURE — 85025 COMPLETE CBC W/AUTO DIFF WBC: CPT

## 2022-12-29 PROCEDURE — 99284 EMERGENCY DEPT VISIT MOD MDM: CPT

## 2022-12-29 PROCEDURE — 96374 THER/PROPH/DIAG INJ IV PUSH: CPT

## 2022-12-29 PROCEDURE — 36415 COLL VENOUS BLD VENIPUNCTURE: CPT

## 2022-12-29 PROCEDURE — 87637 SARSCOV2&INF A&B&RSV AMP PRB: CPT

## 2022-12-29 PROCEDURE — 99284 EMERGENCY DEPT VISIT MOD MDM: CPT | Mod: 25

## 2022-12-29 RX ORDER — KETOROLAC TROMETHAMINE 30 MG/ML
30 SYRINGE (ML) INJECTION ONCE
Refills: 0 | Status: DISCONTINUED | OUTPATIENT
Start: 2022-12-29 | End: 2022-12-29

## 2022-12-29 RX ORDER — SODIUM CHLORIDE 9 MG/ML
1000 INJECTION INTRAMUSCULAR; INTRAVENOUS; SUBCUTANEOUS ONCE
Refills: 0 | Status: COMPLETED | OUTPATIENT
Start: 2022-12-29 | End: 2022-12-29

## 2022-12-29 RX ADMIN — Medication 30 MILLIGRAM(S): at 22:30

## 2022-12-29 RX ADMIN — SODIUM CHLORIDE 2000 MILLILITER(S): 9 INJECTION INTRAMUSCULAR; INTRAVENOUS; SUBCUTANEOUS at 22:29

## 2022-12-29 NOTE — ED PROVIDER NOTE - CLINICAL SUMMARY MEDICAL DECISION MAKING FREE TEXT BOX
A 57-year-old female with history no significant history history of presents to ED complaining of cough congestion sore throat body ache for 2 days.  No shortness of breath.  Patient is vaccinated for COVID, normal exam, testes positive for flu A. started on oral tamiflu

## 2022-12-29 NOTE — ED PROVIDER NOTE - PATIENT PORTAL LINK FT
You can access the FollowMyHealth Patient Portal offered by [facility] by registering at the following website: [email or web address]. By joining [employer]’s FollowMyHealth portal, you will also be able to view your health information using other applications (apps) compatible with our system.

## 2022-12-29 NOTE — ED PROVIDER NOTE - NSFOLLOWUPINSTRUCTIONS_ED_ALL_ED_FT
Gripe en los adultos    Influenza, Adult      La gripe, también llamada “influenza”, es earlene infección viral que afecta principalmente las vías respiratorias. [place] incluye los pulmones, la nariz y la garganta. Se transmite fácilmente de persona a persona (es contagiosa). Provoca síntomas del resfrío común, junto con fiebre marek y dolor corporal.      ¿Cuáles son las causas?    La causa de esta afección es el virus de la influenza. Puede contraer el virus de las siguientes maneras:  •Al inhalar las gotitas que están en el aire liberadas por la tos o el estornudo de earlene persona infectada.      •Tocar algo que tiene el virus (se ha contaminado) y luego tocarse la boca, la nariz o los ojos.        ¿Qué incrementa el riesgo?    Los siguientes factores pueden hacer que sea propenso a contraer la gripe:  •No lavarse o desinfectarse las catalina con frecuencia.      •Tener contacto cercano con muchas personas jabari la temporada de resfrío y gripe.      •Tocarse la boca, los ojos o la nariz sin antes lavarse ni desinfectarse las catalina.      •No recibir la vacuna anual contra la gripe.      Puede correr un mayor riesgo de tener gripe, incluso problemas graves, sergey earlene infección pulmonar (neumonía), si:  •Es mayor de 65 años de edad.      •Está embarazada.      •Tiene debilitado el sistema que combate las enfermedades (sistema inmunitario). [place] incluye a personas que tienen VIH o síndrome de inmunodeficiencia adquirida (SIDA), están recibiendo quimioterapia o están tomando medicamentos que reducen (suprimen) el sistema inmunitario.      •Tiene earlene enfermedad a chi plazo (crónica), sergey earlene enfermedad cardíaca, enfermedad renal, diabetes o enfermedad pulmonar.      •Tiene un trastorno hepático.      •Tiene mucho sobrepeso (obesidad mórbida).      •Tiene anemia.      •Tiene asma.        ¿Cuáles son los signos o síntomas?    Los síntomas de esta afección por lo general comienzan de repente y bates entre 4 y 14 días. Estos pueden incluir:  •Fiebre y escalofríos.      •Caitie de nael, caitie en el cuerpo o caitie musculares.      •Dolor de garganta.      •Tos.      •Secreción o congestión nasal.      •Malestar en el pecho.      •Falta de apetito.      •Debilidad o fatiga.      •Mareos.      •Náuseas o vómitos.        ¿Cómo se diagnostica?    Esta afección se puede diagnosticar en función de lo siguiente:  •Los síntomas y los antecedentes médicos.      •Un examen físico.       •Un hisopado de nariz o garganta y el análisis del líquido extraído para detectar el virus de la gripe.        ¿Cómo se trata?    Si la gripe se diagnostica pronto, se le puede tratar con un medicamento antiviral que se administra por vía oral (por la boca) o por vía intravenosa (i.v.). [place] puede ayudar a reducir la gravedad de la enfermedad y cuánto dura.    Cuidarse en pfeiffer hogar también puede ayudar a aliviar los síntomas. El médico puede recomendarle lo siguiente:  •Usar medicamentos de venta rajeev.      •Beber mucho líquido.      En muchos casos, la gripe desaparece anna marie. Si tiene síntomas graves o complicaciones, puede tratarse en un hospital.      Siga estas instrucciones en pfeiffer casa:    Actividad     •Descanse según sea necesario y duerma danielito.      •Quédese en pfeiffer casa y no concurra al trabajo o a la escuela sergey se lo haya indicado pfeiffer médico. A menos que visite al médico, evite salir de pfeiffer casa hasta que la fiebre haya desaparecido por 24 horas sin kelly medicamentos.      Comida y bebida     •[place] earlene solución de rehidratación oral (SRO). Esta es earlene bebida que se vende en farmacias y tiendas minoristas.      •Sanjana suficiente líquido sergey para mantener la orina de color amarillo pálido.      •En la medida en que pueda, sanjana líquidos transparentes en pequeñas cantidades. Los líquidos transparentes incluyen agua, cubitos de hielo, jugo de fruta rebajado con agua y bebidas deportivas bajas en calorías.      •En la medida en que pueda, consuma alimentos blandos y fáciles de digerir en pequeñas cantidades. Estos alimentos incluyen bananas, compota de manzana, arroz, dayday magras, tostadas y galletas saladas.      •Evite consumir líquidos que contengan mucha azúcar o cafeína, sergey bebidas energéticas, bebidas deportivas comunes y refrescos.      •Evite kelly alcohol.      •Evite los alimentos condimentados o con alto contenido de grasa.        Indicaciones generales                   •Use los medicamentos de venta rajeev y los recetados solamente sergey se lo haya indicado el médico.    •Use un humidificador de aire frío para agregar humedad al aire de pfeiffer casa. [place] puede facilitar la respiración.  •Cuando utilice un humidificador de vapor frío, límpielo a diario. Vacíe el agua y cámbiela por agua limpia.        •Al toser o estornudar, cúbrase la boca y la nariz.      •Lávese las catalina frecuentemente con agua y jabón y jabari al menos 20 segundos, en especial después de toser o estornudar. Use desinfectante para catalina con alcohol si no dispone de agua y jabón.      •Cumpla con todas las visitas de seguimiento. [place] es importante.        ¿Cómo se previene?     •Colóquese la vacuna anual contra la gripe. Esta suele estar disponible a finales del verano, en el otoño o en el invierno. Pregúntele al médico cuándo debe colocarse la vacuna contra la gripe.      •Evite el contacto con personas que están enfermas jabari la temporada de resfrío y gripe. Generalmente es jabari el otoño y el invierno.        Comuníquese con un médico si:    •Tiene nuevos síntomas.    •Tiene los siguientes síntomas:  •Dolor de pecho.      •Diarrea.      •Fiebre.        •La tos empeora.      •Produce más mucosidad.      •Siente náuseas o vomita.        Solicite ayuda de inmediato si:    •Presenta falta el aire o dificultad para respirar.      •La piel o las uñas se ponen de un color azulado.      •Presenta dolor intenso o rigidez en el olimpia.      •Siente un dolor repentino en la nael, la juan luis o el oído.      •No puede comer ni beber sin vomitar.      Estos síntomas pueden representar un problema grave que constituye earlene emergencia. No espere a noemy si los síntomas desaparecen. Solicite atención médica de inmediato. Comuníquese con el servicio de emergencias de pfeiffer localidad (911 en los [place]). No conduzca por opal propios medios hasta el hospital.       Resumen    •La gripe, también llamada “influenza”, es earlene infección viral que afecta principalmente las vías respiratorias.      •Los síntomas de la gripe normalmente comienzan de repente y bates entre 4 y 14 días.      •Colocarse la vacuna anual contra la gripe es la mejor manera de prevenir el contagio de la gripe.      •Quédese en pfeiffer casa y no concurra al trabajo o a la escuela sergey se lo haya indicado pfeiffer médico. A menos que visite al médico, evite salir de pfeiffer casa hasta que la fiebre haya desaparecido por 24 horas sin kelly medicamentos.      •Cumpla con todas las visitas de seguimiento. [place] es importante.      Esta información no tiene sergey fin reemplazar el consejo del médico. Asegúrese de hacerle al médico cualquier pregunta que tenga.      Document Revised: 10/13/2021 Document Reviewed: 09/07/2021    Elsevier Patient Education © 2022 [employer]

## 2022-12-29 NOTE — ED PROVIDER NOTE - OBJECTIVE STATEMENT
A 57-year-old female with history no significant history history of presents to ED complaining of cough congestion sore throat body ache for 2 days.  No shortness of breath.  Patient is vaccinated for COVID

## 2023-01-10 ENCOUNTER — APPOINTMENT (OUTPATIENT)
Dept: UROGYNECOLOGY | Facility: CLINIC | Age: 58
End: 2023-01-10

## 2023-03-06 NOTE — ASU PREOP CHECKLIST - HEIGHT IN CM
[FreeTextEntry1] : I had a lengthy discussion with the patient in regards to the treatment plan and diagnosis.  The patient does have objective weakness findings on my exam.  Furthermore I am concerned in regards to compression along the patient's spinal cord and/or nerve roots.  As a result I would like to proceed with an MRI of the patient's cervical spine. In tandem, the patient can continue with a conservative home exercise program. Rx prednisone taper, Tizanidine.  I will have the patient follow-up in 3 to 4 weeks for repeat clinical evaluation.  I encouraged the patient to reach out to me directly at any point if their symptoms worsen or change in any way.	\par \par Conservative Treatment Statement\par The patient has tried the following treatments:\par Activity modification         	+\par Ice/Compression                    +\par Braces                                     -\par NSAIDS                                  +\par Physical Therapy                    +\par \par Please note the above modalities have been tried for 6+ weeks over the past 2 months. 	 
157.48

## 2023-05-11 NOTE — ASU PREOPERATIVE ASSESSMENT, ADULT ([facility] ONLY) - HEIGHT IN FEET
5 No. DANIEL screening performed.  STOP BANG Legend: 0-2 = LOW Risk; 3-4 = INTERMEDIATE Risk; 5-8 = HIGH Risk

## 2023-05-15 ENCOUNTER — APPOINTMENT (OUTPATIENT)
Dept: UROGYNECOLOGY | Facility: CLINIC | Age: 58
End: 2023-05-15
Payer: MEDICARE

## 2023-05-15 VITALS
SYSTOLIC BLOOD PRESSURE: 120 MMHG | HEART RATE: 80 BPM | OXYGEN SATURATION: 99 % | WEIGHT: 122 LBS | BODY MASS INDEX: 23.95 KG/M2 | DIASTOLIC BLOOD PRESSURE: 70 MMHG | TEMPERATURE: 98 F | HEIGHT: 60 IN

## 2023-05-15 DIAGNOSIS — R35.0 FREQUENCY OF MICTURITION: ICD-10-CM

## 2023-05-15 LAB
BILIRUB UR QL STRIP: NORMAL
CLARITY UR: CLEAR
COLLECTION METHOD: NORMAL
GLUCOSE UR-MCNC: NORMAL
HCG UR QL: 0.2 EU/DL
HGB UR QL STRIP.AUTO: NORMAL
KETONES UR-MCNC: NORMAL
LEUKOCYTE ESTERASE UR QL STRIP: NORMAL
NITRITE UR QL STRIP: NORMAL
PH UR STRIP: 7.5
PROT UR STRIP-MCNC: NORMAL
SP GR UR STRIP: 1.02

## 2023-05-15 PROCEDURE — 51736 URINE FLOW MEASUREMENT: CPT

## 2023-05-15 PROCEDURE — 51725 SIMPLE CYSTOMETROGRAM: CPT

## 2023-05-15 PROCEDURE — 81003 URINALYSIS AUTO W/O SCOPE: CPT | Mod: QW

## 2023-05-15 PROCEDURE — 99204 OFFICE O/P NEW MOD 45 MIN: CPT | Mod: 25

## 2023-05-16 RX ORDER — ESTRADIOL 0.1 MG/G
0.1 CREAM VAGINAL
Qty: 1 | Refills: 3 | Status: ACTIVE | COMMUNITY
Start: 2018-10-23 | End: 1900-01-01

## 2023-06-02 NOTE — H&P PST ADULT - PROBLEM/PLAN-2
[Change in Activity] : no change in activity [Fever] : no fever [Wgt Loss (___ Lbs)] : no recent weight loss [Eye Discharge] : no eye discharge [Redness] : no redness [Swollen Eyelids] : no swollen eyelids [Change in Vision] : no change in vision  [Nasal Stuffiness] : no nasal congestion [Sore Throat] : no sore throat [Earache] : no earache [Nosebleeds] : no epistaxis [Cyanosis] : no cyanosis [Edema] : no edema [Diaphoresis] : not diaphoretic [Exercise Intolerance] : no persistence of exercise intolerance [Chest Pain] : no chest pain or discomfort [Palpitations] : no palpitations [Tachypnea] : not tachypneic [Wheezing] : no wheezing [Cough] : no cough [Shortness of Breath] : no shortness of breath [Change in Appetite] : no change in appetite [Vomiting] : no vomiting [Diarrhea] : no diarrhea [Abdominal Pain] : no abdominal pain [Constipation] : no constipation [Fainting (Syncope)] : no fainting [Seizure] : no seizures [Headache] : no headache [Dizziness] : no dizziness [Limping] : no limping [Joint Pains] : no arthralgias [Joint Swelling] : no joint swelling [Back Pain] : ~T no back pain [Muscle Aches] : no muscle aches [Rash] : no rash [Insect Bites] : no insect bites [Skin Lesions] : no skin lesions [Bruising] : no tendency for easy bruising [Swollen Glands] : no lymphadenopathy [Sleep Disturbances] : ~T no sleep disturbances [Hyperactive] : no hyperactive behavior [Emotional Problems] : no ~T emotional problems [Change In Personality] : ~T no personality change [Dec Urine Output] : no oliguria [Urinary Frequency] : no change in urinary frequency [Pain During Urination (Dysuria)] : no dysuria [Vaginal Discharge] : no vaginal discharge [Pubertal Concerns] : no pubertal concerns [Delayed Menarche] : no delayed menarche [Irregular Periods] : no irregular periods DISPLAY PLAN FREE TEXT

## 2023-06-09 ENCOUNTER — NON-APPOINTMENT (OUTPATIENT)
Age: 58
End: 2023-06-09

## 2023-06-13 NOTE — HISTORY OF PRESENT ILLNESS
[FreeTextEntry1] : This is a 58-year-old woman who originally came to me in 2015 having a previous a spark sling.  She ended up with a subsequent mid urethral sling which failed and a fascial sling in 2016   is doing well from incontinence viewpoint but has frequency urgency hesitancy and  a chief complaint of burning when she urinates as well as dyspareunia\par \par Examination abdomen soft nontender extremities no pain\par external genitalia show atrophic changes\par Kenna vulva and urethral area are erythematous consistent with atrophy and possible chafing related to intercourse.\par Vaginally mucosa is atrophic but there is no evidence of banding or mesh extrusion.\par \par TRANS-URETHRAL BLADDER CATHERIZATION: Due to symptoms of hesitancy, to rule out UTI, and to rule our retention, sterile prep and bladder catherization was performed.\par Post Void Residual volume was  50  cc.\par Urine analysis demonstrated  negative\par \par \par DIAGNOSTIC PROCEDURE: SIMPLE CYSTOMETRY :\par  -diagnostic test indicated as general examination, history, urine analysis and  microscopy failed to explain patient complaints\par \par In the supine position, the urethra was prepped with Betadine. A 16 [place] catheter was gently passed into the bladder with sterile technique and secured in place.\par A urine sample was obtained via catheterization. Sterile water was infused by gravity via an irrigation syringe.\par Patient sensation, change in flow rate, and capacity were observed: \par \par First Sensation   10       (cc)      First Urgency   150     Increase Urge    170     Capacity  300      \par Pain with Fill:  NEGATIVE   \par Sensory Urgency:      moderate \par Post Fill Void     304     (cc):     she is able to empty the bladder completely\par \par Urine flow pattern intermittent urinary stream with low peak urinary flow rate of 11 mL per second                 \par \par Involuntary detrusor contractions:   contractions present\par \par Cough Stress Test :    negative    \par \par Diagnoses: \par \par genitourinary symptoms of menopause atrophic changes of the urethra like accounting for atrophy with a negative urine analysis\par R. Detailed instructions using one cream each night with 1 g each night was provided in detail \par \par Vitamin C one thousand milligrams \par cranberry tablets twice daily \par continue.Pyridium which  she or he has in her possession \par \par \par Returns in six weeks will consider vaginally dilators of the atrophy is \par improved. E distal treatment for overactive bladder would include medications Botox \par \par Vagina snug consistent with pelvic floor dysfunction and vaginally stenosis\par \par Documented vaginally abstinence from penetration while treating with estrogen alternating with Lotrisone\par \par Risks, benefits adverse reactions including the relationship between estrogens and malignancy were counseled in detail\par \par Consider more dedicated treatment of overactive bladder but I believe most of the symptoms related to atrophic changes\par \par Estrogen has been effective consider vaginally dilators at next visit. Patient was instructed to purchase imaginal dilator set\par \par \par Review of previous notes, labs, current prescriptions was performed.\par History , Physical counseling was performed. \par All questions answered in lay language\par Total time for encounter was  48     min\par A chaperone was present for the entirety of the encounter\par \par \par \par

## 2023-06-30 NOTE — ED ADULT NURSE NOTE - NSSEPSISSUSPECTED_ED_A_ED
PCP: Christian Jones MD     Last appt:  5/24/2023      Future Appointments   Date Time Provider [address]  [address]   7/25/2023 11:40 AM NURSE RODY RUIZ   9/29/2023 11:10 AM MD RODY Zaragoza          Requested Prescriptions     Pending Prescriptions Disp Refills    clotrimazole-betamethasone (LOTRISONE) 1-0.05 % cream [Pharmacy Med Name: Clotrimazole-Betamethasone 1-0.05 % External Cream] 15 g 0     Sig: APPLY  CREAM TOPICALLY TO AFFECTED AREA TWICE DAILY
No

## 2023-07-17 ENCOUNTER — APPOINTMENT (OUTPATIENT)
Dept: UROGYNECOLOGY | Facility: CLINIC | Age: 58
End: 2023-07-17
Payer: MEDICARE

## 2023-07-17 PROCEDURE — 99214 OFFICE O/P EST MOD 30 MIN: CPT | Mod: 25

## 2023-07-17 PROCEDURE — 51701 INSERT BLADDER CATHETER: CPT

## 2023-07-17 RX ORDER — CLOTRIMAZOLE AND BETAMETHASONE DIPROPIONATE 10; .5 MG/G; MG/G
1-0.05 CREAM TOPICAL
Qty: 1 | Refills: 3 | Status: ACTIVE | COMMUNITY
Start: 2018-10-23 | End: 1900-01-01

## 2023-07-18 LAB
APPEARANCE: CLEAR
BACTERIA: NEGATIVE /HPF
BILIRUBIN URINE: NEGATIVE
BLOOD URINE: NEGATIVE
CAST: 0 /LPF
COLOR: ABNORMAL
EPITHELIAL CELLS: 0 /HPF
GLUCOSE QUALITATIVE U: NEGATIVE MG/DL
KETONES URINE: NEGATIVE MG/DL
LEUKOCYTE ESTERASE URINE: ABNORMAL
MICROSCOPIC-UA: NORMAL
NITRITE URINE: POSITIVE
PH URINE: 7.5
PROTEIN URINE: NEGATIVE MG/DL
RED BLOOD CELLS URINE: 1 /HPF
REVIEW: NORMAL
SPECIFIC GRAVITY URINE: 1.01
UROBILINOGEN URINE: 1 MG/DL
WHITE BLOOD CELLS URINE: 0 /HPF

## 2023-07-20 NOTE — HISTORY OF PRESENT ILLNESS
[FreeTextEntry1] : This is a 58-year-old woman is being treated for menopausal atrophic changes of the urethra. \par \par Examination abdomen soft nontender extremities no pain\par external genitalia show atrophic changes\par Kenna vulva and urethral area are erythematous consistent with atrophy and possible chafing related to intercourse.\par Vaginally mucosa is atrophic but there is no evidence of banding or mesh extrusion.\par \par Vagina snug consistent with pelvic floor dysfunction and vaginally stenosis\par Dilator 3.25in used today and will continue to use at home 3 x  week for 15 min intervals\par If she feels comfortable with 3.25, pt advised to advance to 4 in\par \par TRANS-URETHRAL BLADDER CATHERIZATION: Due to symptoms of hesitancy, to rule out UTI, and to rule our retention, sterile prep and bladder catherization was performed.\par Post Void Residual volume was  50  cc.\par Urine analysis not demonstrated as pt took pyridium \par UA and culture sent\par Macrobid BID x 3 days sent until cultures result for s/s of UTI\par \par Pt will continue to take Vitamin C one thousand milligrams \par cranberry tablets twice daily \par alternate estradiol cream and lotrisone cream\par continue.Pyridium PRN\par \par \par Returns in four weeks \par \par \par Review of previous notes, labs, current prescriptions was performed.\par History , Physical counseling was performed. \par All questions answered in lay language\par Total time for encounter was       min\par A chaperone was present for the entirety of the encounter\par \par \par \par

## 2023-07-21 LAB — BACTERIA UR CULT: ABNORMAL

## 2023-07-21 RX ORDER — AMOXICILLIN AND CLAVULANATE POTASSIUM 500; 125 MG/1; MG/1
500-125 TABLET, FILM COATED ORAL
Qty: 10 | Refills: 0 | Status: ACTIVE | COMMUNITY
Start: 2023-07-21 | End: 1900-01-01

## 2023-07-21 RX ORDER — NITROFURANTOIN (MONOHYDRATE/MACROCRYSTALS) 25; 75 MG/1; MG/1
100 CAPSULE ORAL
Qty: 10 | Refills: 0 | Status: DISCONTINUED | COMMUNITY
Start: 2023-07-17 | End: 2023-07-21

## 2023-07-31 ENCOUNTER — APPOINTMENT (OUTPATIENT)
Dept: ORTHOPEDIC SURGERY | Facility: CLINIC | Age: 58
End: 2023-07-31
Payer: MEDICARE

## 2023-07-31 DIAGNOSIS — G89.29 PAIN IN LEFT KNEE: ICD-10-CM

## 2023-07-31 DIAGNOSIS — M25.562 PAIN IN LEFT KNEE: ICD-10-CM

## 2023-07-31 PROCEDURE — 99214 OFFICE O/P EST MOD 30 MIN: CPT

## 2023-07-31 PROCEDURE — 73564 X-RAY EXAM KNEE 4 OR MORE: CPT | Mod: LT

## 2023-08-03 PROBLEM — M25.562 CHRONIC PAIN OF LEFT KNEE: Status: ACTIVE | Noted: 2023-08-03

## 2023-08-03 NOTE — HISTORY OF PRESENT ILLNESS
[de-identified] : 58 year old female presents today for left knee pain x 8 years. S/P left knee arthroscopy/ removal cyst in the knee 2012 and was doing well until 2 years ago. She was recently seen by outside orthopedist; MRI was obtained and offered patient a steroid injection which she declined. The pain is intermittent not sure what triggers pain. She is not taking pain medication. Denies catching, Locking numbness or tingling.

## 2023-08-03 NOTE — ADDENDUM
[FreeTextEntry1] : This note was written by Kaila Barajas on 07/31/2023 acting solely as a scribe for Dr. Garry Tony.  All medical record entries made by the Scribe were at my, Dr. Garry Tony, direction and personally dictated by me on 07/31/2023. I have personally reviewed the chart and agree that the record accurately reflects my personal performance of the history, physical exam, assessment and plan.

## 2023-08-03 NOTE — PHYSICAL EXAM
[de-identified] : Oriented to time, place, person Mood: Normal Affect: Normal Appearance: Healthy, well appearing, no acute distress. Gait: Normal Assistive Devices: None  Left Knee Exam:  Skin: Clean, dry, intact Inspection: No obvious malalignment, no masses, no swelling, no effusion Pulses: 2+ DP/PT pulses  ROM: 0-135 degrees of flexion. No pain with deep knee flexion/extension. Tenderness: + MJLT. No LJLT. No pain over the patella facets. No pain to the quadriceps tendon. No pain to the patella tendon. No posterior knee tenderness. Stability: Stable to varus, valgus. Negative Lachman testing. Negative anterior drawer, negative posterior drawer. Strength: 5/5 Q/H/TA/GS/EHL, without atrophy Neuro: Intact to light touch throughout, DTRs normal Additional Tests: Negative Meenakshi's test, Negative patellar grind test  [de-identified] : The following radiographs were ordered and read by me during this patients visit. I reviewed each radiograph in detail with the patient and discussed the findings as highlighted below.   4 views of the left knee were obtained today, 07/31/2023, that show no acute fracture or dislocation. There is mild medial, minimal lateral and minimal patellofemoral degenerative changes seen. There is no significant malalignment. No significant other obvious osseous abnormality, otherwise unremarkable.   MRI left knee dated 5/25/2023 shows evidence of prior meniscectomy, continued peripheral degenerative medial meniscus tear. Early medial OA and PFJ. No signifincant Bakers cyst.   We independently reviewed and discussed in detail the images and the radiologic reports with the patient.

## 2023-08-03 NOTE — DISCUSSION/SUMMARY
[de-identified] : 57 y/o female with left knee pain.   Patient presents for evaluation of chronic knee pain. Imaging shows early degenerative changes s/p meniscectomy, and MRI shows continued degenerative medial meniscus tear. Presentation is consistent with chronic degenerative changes and arthrosis within the knee. Pain is likely due to overuse, and age-related "wear and tear". Surgical intervention not required.   Recommendation: Continued conservative care & observation; including rest/activity avoidance until less symptomatic with low impact activity as tolerated. Patient may also use OTC NSAIDs or acetaminophen as tolerated, with application of ice/heat to the area 2-3x daily for 20 minutes after periods of activity.   Follow up as needed if pain persists for further evaluation and treatment.

## 2023-08-21 ENCOUNTER — APPOINTMENT (OUTPATIENT)
Dept: UROGYNECOLOGY | Facility: CLINIC | Age: 58
End: 2023-08-21
Payer: MEDICARE

## 2023-08-21 VITALS
HEIGHT: 60 IN | BODY MASS INDEX: 23.95 KG/M2 | DIASTOLIC BLOOD PRESSURE: 78 MMHG | SYSTOLIC BLOOD PRESSURE: 120 MMHG | HEART RATE: 91 BPM | OXYGEN SATURATION: 98 % | TEMPERATURE: 97.3 F | WEIGHT: 122 LBS

## 2023-08-21 DIAGNOSIS — R39.11 HESITANCY OF MICTURITION: ICD-10-CM

## 2023-08-21 PROCEDURE — 99214 OFFICE O/P EST MOD 30 MIN: CPT

## 2023-08-21 NOTE — HISTORY OF PRESENT ILLNESS
[FreeTextEntry1] : This is a patient who returned to me recently for atrophic changes and dyspareunia with vaginal stenosis.  She was known to me previously having come to me with an outside institution [facility] procedure and required a fascial sling by myself.  She has developed atrophy and dyspareunia recently.  She has marked improvement in vaginal comfort without intercourse with estrogen gin and Lotrisone alternating days.  She is also started with vaginal dilator set.  On examination the vaginal mucosa is markedly improved and is no longer sensitive to touch.  There is tolerance to stretching.  I have counseled her regarding myofascial release and dilator utilization.  There is a long wait for pelvic floor physical therapy and has she is on a waiting list.  I recommended continue to abstain for at least 2 to 3 weeks until she is using the second largest dilator and myofascial release for about 3 weeks.  I then counseled that lubrication would be necessary and slow start to Initiation of sexual activity.  She will use estrogen only and can use the Lotrisone as needed.  Follow-up in 3 months.  Review of previous notes, labs, current prescriptions was performed. History , Physical counseling was performed.  All questions answered in lay language Total time for encounter was    47   min A chaperone was present for the entirety of the encounter

## 2023-11-14 ENCOUNTER — APPOINTMENT (OUTPATIENT)
Dept: UROGYNECOLOGY | Facility: CLINIC | Age: 58
End: 2023-11-14
Payer: MEDICARE

## 2023-11-14 VITALS
SYSTOLIC BLOOD PRESSURE: 130 MMHG | HEIGHT: 60 IN | HEART RATE: 91 BPM | BODY MASS INDEX: 25.13 KG/M2 | OXYGEN SATURATION: 98 % | TEMPERATURE: 97.1 F | WEIGHT: 128 LBS | DIASTOLIC BLOOD PRESSURE: 76 MMHG

## 2023-11-14 DIAGNOSIS — M62.838 OTHER MUSCLE SPASM: ICD-10-CM

## 2023-11-14 PROBLEM — R39.11 HESITANCY: Status: ACTIVE | Noted: 2023-05-15

## 2023-11-14 PROCEDURE — 51798 US URINE CAPACITY MEASURE: CPT

## 2023-11-14 PROCEDURE — 99214 OFFICE O/P EST MOD 30 MIN: CPT | Mod: 25

## 2023-12-04 RX ORDER — BACLOFEN 100 %
POWDER (GRAM) MISCELLANEOUS
Qty: 30 | Refills: 0 | Status: ACTIVE | COMMUNITY
Start: 2023-11-14 | End: 1900-01-01

## 2023-12-18 ENCOUNTER — APPOINTMENT (OUTPATIENT)
Dept: ULTRASOUND IMAGING | Facility: CLINIC | Age: 58
End: 2023-12-18
Payer: MEDICARE

## 2023-12-18 ENCOUNTER — OUTPATIENT (OUTPATIENT)
Dept: OUTPATIENT SERVICES | Facility: HOSPITAL | Age: 58
LOS: 1 days | End: 2023-12-18
Payer: MEDICARE

## 2023-12-18 DIAGNOSIS — N31.8 OTHER NEUROMUSCULAR DYSFUNCTION OF BLADDER: Chronic | ICD-10-CM

## 2023-12-18 DIAGNOSIS — Z98.890 OTHER SPECIFIED POSTPROCEDURAL STATES: Chronic | ICD-10-CM

## 2023-12-18 DIAGNOSIS — Z98.89 OTHER SPECIFIED POSTPROCEDURAL STATES: Chronic | ICD-10-CM

## 2023-12-18 DIAGNOSIS — Z90.710 ACQUIRED ABSENCE OF BOTH CERVIX AND UTERUS: Chronic | ICD-10-CM

## 2023-12-18 DIAGNOSIS — S62.102A FRACTURE OF UNSPECIFIED CARPAL BONE, LEFT WRIST, INITIAL ENCOUNTER FOR CLOSED FRACTURE: Chronic | ICD-10-CM

## 2023-12-18 DIAGNOSIS — K76.0 FATTY (CHANGE OF) LIVER, NOT ELSEWHERE CLASSIFIED: ICD-10-CM

## 2023-12-18 DIAGNOSIS — Z98.51 TUBAL LIGATION STATUS: Chronic | ICD-10-CM

## 2023-12-18 PROCEDURE — 76700 US EXAM ABDOM COMPLETE: CPT

## 2023-12-18 PROCEDURE — 76700 US EXAM ABDOM COMPLETE: CPT | Mod: 26

## 2024-01-09 ENCOUNTER — APPOINTMENT (OUTPATIENT)
Dept: OBGYN | Facility: CLINIC | Age: 59
End: 2024-01-09

## 2024-01-16 ENCOUNTER — APPOINTMENT (OUTPATIENT)
Dept: UROGYNECOLOGY | Facility: CLINIC | Age: 59
End: 2024-01-16
Payer: MEDICARE

## 2024-01-16 VITALS
OXYGEN SATURATION: 97 % | BODY MASS INDEX: 25.13 KG/M2 | WEIGHT: 128 LBS | SYSTOLIC BLOOD PRESSURE: 120 MMHG | HEIGHT: 60 IN | HEART RATE: 90 BPM | DIASTOLIC BLOOD PRESSURE: 72 MMHG | TEMPERATURE: 97.8 F

## 2024-01-16 PROCEDURE — 99214 OFFICE O/P EST MOD 30 MIN: CPT

## 2024-01-16 NOTE — HISTORY OF PRESENT ILLNESS
[FreeTextEntry1] : This is a 59-year-old woman taking care of in our practice for dyspareunia vaginal stenosis and pelvic floor dysfunction.  She has had excellent improvement with the combination of vaginal self myofascial release, dedicated professional physical therapy with [facility] women's pelvic  and muscle relaxation techniques.  She is able to have intercourse with for less discomfort although it is not overtly satisfying.  Psychologically she would like to stop having intercourse and we a long discussion regarding the psychosocial issues regarding the wish to cease sexual activity and potential ways to introduce this to her spouse.  She is in a good relationship and I encouraged open conversation.  On examination the abdomen soft nontender no masses the pelvic examination is far more tolerable to 1 and 2 finger examination.  Posterior stretch allows a 1 to 2 cm traverse of the posterior fourchette which is markedly improved than previous.  There is still residual increased pelvic spasm at 4 and 8:00 and I have encouraged her to work on these areas and given her a diagram to share with her physical therapist.  She will be returning to her country and spending more time in [facility] and she will come to see me prior to her leaving the country so we will make sure she has prescriptions and recommendations prior to being out of the country more than in the country.   Review of previous notes, labs, current prescriptions was performed. History , Physical counseling was performed.  All questions answered in lay language Total time for encounter was   39    min A chaperone was present for the entirety of the encounter

## 2024-02-14 ENCOUNTER — APPOINTMENT (OUTPATIENT)
Dept: OBGYN | Facility: CLINIC | Age: 59
End: 2024-02-14

## 2024-03-14 DIAGNOSIS — N95.2 POSTMENOPAUSAL ATROPHIC VAGINITIS: ICD-10-CM

## 2024-03-14 RX ORDER — ESTRADIOL 0.1 MG/G
0.1 CREAM VAGINAL
Qty: 1 | Refills: 3 | Status: ACTIVE | COMMUNITY
Start: 2024-03-14 | End: 1900-01-01

## 2024-03-18 ENCOUNTER — APPOINTMENT (OUTPATIENT)
Dept: NUCLEAR MEDICINE | Facility: HOSPITAL | Age: 59
End: 2024-03-18
Payer: MEDICARE

## 2024-03-18 ENCOUNTER — OUTPATIENT (OUTPATIENT)
Dept: OUTPATIENT SERVICES | Facility: HOSPITAL | Age: 59
LOS: 1 days | End: 2024-03-18

## 2024-03-18 DIAGNOSIS — Z98.89 OTHER SPECIFIED POSTPROCEDURAL STATES: Chronic | ICD-10-CM

## 2024-03-18 DIAGNOSIS — S62.102A FRACTURE OF UNSPECIFIED CARPAL BONE, LEFT WRIST, INITIAL ENCOUNTER FOR CLOSED FRACTURE: Chronic | ICD-10-CM

## 2024-03-18 DIAGNOSIS — N31.8 OTHER NEUROMUSCULAR DYSFUNCTION OF BLADDER: Chronic | ICD-10-CM

## 2024-03-18 DIAGNOSIS — Z90.710 ACQUIRED ABSENCE OF BOTH CERVIX AND UTERUS: Chronic | ICD-10-CM

## 2024-03-18 DIAGNOSIS — R11.0 NAUSEA: ICD-10-CM

## 2024-03-18 DIAGNOSIS — K31.84 GASTROPARESIS: ICD-10-CM

## 2024-03-18 DIAGNOSIS — Z98.890 OTHER SPECIFIED POSTPROCEDURAL STATES: Chronic | ICD-10-CM

## 2024-03-18 DIAGNOSIS — Z98.51 TUBAL LIGATION STATUS: Chronic | ICD-10-CM

## 2024-03-18 DIAGNOSIS — N39.3 STRESS INCONTINENCE (FEMALE) (MALE): Chronic | ICD-10-CM

## 2024-03-18 PROCEDURE — 78264 GASTRIC EMPTYING IMG STUDY: CPT | Mod: 26

## 2024-05-20 ENCOUNTER — APPOINTMENT (OUTPATIENT)
Dept: OBGYN | Facility: CLINIC | Age: 59
End: 2024-05-20

## 2024-06-05 ENCOUNTER — APPOINTMENT (OUTPATIENT)
Dept: UROGYNECOLOGY | Facility: CLINIC | Age: 59
End: 2024-06-05
Payer: MEDICARE

## 2024-06-05 VITALS
DIASTOLIC BLOOD PRESSURE: 73 MMHG | HEIGHT: 60 IN | SYSTOLIC BLOOD PRESSURE: 127 MMHG | BODY MASS INDEX: 25.13 KG/M2 | OXYGEN SATURATION: 96 % | WEIGHT: 128 LBS | HEART RATE: 86 BPM

## 2024-06-05 DIAGNOSIS — N39.41 URGE INCONTINENCE: ICD-10-CM

## 2024-06-05 DIAGNOSIS — L29.2 PRURITUS VULVAE: ICD-10-CM

## 2024-06-05 PROCEDURE — 99215 OFFICE O/P EST HI 40 MIN: CPT | Mod: 25

## 2024-06-05 PROCEDURE — 99459 PELVIC EXAMINATION: CPT

## 2024-06-05 PROCEDURE — 51701 INSERT BLADDER CATHETER: CPT

## 2024-06-05 RX ORDER — ESTRADIOL 0.1 MG/G
0.1 CREAM VAGINAL
Qty: 1 | Refills: 3 | Status: ACTIVE | COMMUNITY
Start: 2023-05-15 | End: 1900-01-01

## 2024-06-05 NOTE — HISTORY OF PRESENT ILLNESS
[FreeTextEntry1] : RYAN HARDEN is a 59-year-old P2 presenting for evaluation of UTI 2 weeks ago, treated at [facility]. One week later started having urgency urinary incontinence. Reports frequent UTI. Former patient of Dr. Miner, last seen in 1/2024. Pt has a h/o chronic dysuria. Also reports vulvar itching x 5 days. Extensive review of previous notes done. Pt with long pelvic floor disorder history. S/p total hysterectomy for fibroids. H/o suprapubic arc (SPARC) sling in 2012 at OSH. Required catheterization for some time after this. Had persistent EDILSON symptoms and underwent a sling lysis and placement of midurethral retropubic TVT in 2015 with Dr. Miner. Pt failed 2nd sling at the time. She then underwent a fascial sling in 2016, which was complicated by urinary retention requiring self-catheterization; this spontaneously resolved. She then developed dysuria, vaginal irritation, and dyspareunia. Pt also had persistent OAB symptoms over time. For dyspareunia she had been in pelvic floor PT and was using vaginal dilators. She is using vaginal estrogen cream every other day. No longer having sexual intercourse with .  Daytime voids: 6+  Nighttime voids: 4+  Her fluid intake includes: 40 oz water

## 2024-06-05 NOTE — DISCUSSION/SUMMARY
[FreeTextEntry1] : #Urinary incontinence - Unclear etiology of new onset urinary symptoms in context of recent UTI; may be recurrence of OAB symptoms. Urine dipstick negative however based on symptoms will send urine culture.  - If negative would recommend urodynamic testing to further evaluate bladder function given extensive urogynecologic history. - Continue vaginal estrogen therapy, refill Rx sent to pharmacy.  #Vulvar itching - Will r/o out HSV due to 1mm possible vesicular labial lesion, otherwise vulvar examination unremarkable

## 2024-06-05 NOTE — PHYSICAL EXAM
[Chaperone Present] : A chaperone was present in the examining room during all aspects of the physical examination [96004] : A chaperone was present during the pelvic exam. [Labia Majora] : were normal [Labia Minora] : were normal [Normal] : was normal [Normal Appearance] : general appearance was normal [Atrophy] : atrophy [Absent] : absent [FreeTextEntry2] : Fabián [FreeTextEntry1] : General: Well, appearing, no acute distress HEENT: Normocephalic, atraumatic Respiratory: Speaking in full sentences comfortably, normal work of breathing and no cough during visit Extremities: No upper extremity edema noted Skin: No obvious rash or skin lesions Neuro: Alert and oriented x 3, speech is fluent, normal rate Psych: Normal mood and affect [de-identified] : 1mm vesicular lesion right labia majora

## 2024-06-09 ENCOUNTER — NON-APPOINTMENT (OUTPATIENT)
Age: 59
End: 2024-06-09

## 2024-06-09 LAB
HSV+VZV DNA SPEC QL NAA+PROBE: NOT DETECTED
SPECIMEN SOURCE: NORMAL

## 2024-08-02 ENCOUNTER — APPOINTMENT (OUTPATIENT)
Dept: UROGYNECOLOGY | Facility: CLINIC | Age: 59
End: 2024-08-02
Payer: MEDICARE

## 2024-08-02 ENCOUNTER — OUTPATIENT (OUTPATIENT)
Dept: OUTPATIENT SERVICES | Facility: HOSPITAL | Age: 59
LOS: 1 days | End: 2024-08-02
Payer: MEDICARE

## 2024-08-02 DIAGNOSIS — Z98.890 OTHER SPECIFIED POSTPROCEDURAL STATES: Chronic | ICD-10-CM

## 2024-08-02 DIAGNOSIS — Z98.89 OTHER SPECIFIED POSTPROCEDURAL STATES: Chronic | ICD-10-CM

## 2024-08-02 DIAGNOSIS — R33.9 RETENTION OF URINE, UNSPECIFIED: ICD-10-CM

## 2024-08-02 DIAGNOSIS — Z98.51 TUBAL LIGATION STATUS: Chronic | ICD-10-CM

## 2024-08-02 DIAGNOSIS — Z90.710 ACQUIRED ABSENCE OF BOTH CERVIX AND UTERUS: Chronic | ICD-10-CM

## 2024-08-02 DIAGNOSIS — S62.102A FRACTURE OF UNSPECIFIED CARPAL BONE, LEFT WRIST, INITIAL ENCOUNTER FOR CLOSED FRACTURE: Chronic | ICD-10-CM

## 2024-08-02 DIAGNOSIS — N31.8 OTHER NEUROMUSCULAR DYSFUNCTION OF BLADDER: Chronic | ICD-10-CM

## 2024-08-02 DIAGNOSIS — Z01.818 ENCOUNTER FOR OTHER PREPROCEDURAL EXAMINATION: ICD-10-CM

## 2024-08-02 DIAGNOSIS — N39.3 STRESS INCONTINENCE (FEMALE) (MALE): Chronic | ICD-10-CM

## 2024-08-02 PROCEDURE — 51729 CYSTOMETROGRAM W/VP&UP: CPT

## 2024-08-02 PROCEDURE — 51784 ANAL/URINARY MUSCLE STUDY: CPT | Mod: 26

## 2024-08-02 PROCEDURE — 51729 CYSTOMETROGRAM W/VP&UP: CPT | Mod: 26

## 2024-08-02 PROCEDURE — 51797 INTRAABDOMINAL PRESSURE TEST: CPT | Mod: 26

## 2024-08-02 PROCEDURE — 51797 INTRAABDOMINAL PRESSURE TEST: CPT

## 2024-08-02 PROCEDURE — 51784 ANAL/URINARY MUSCLE STUDY: CPT

## 2024-08-05 NOTE — H&P PST ADULT - LAB RESULTS AND INTERPRETATION
[Father] : father [Fruit] : fruit [Vegetables] : vegetables [Meat] : meat [Grains] : grains [Eggs] : eggs [Fish] : fish [Normal] : Normal [In own bed] : In own bed [Brushing teeth] : Brushing teeth [Yes] : Patient goes to dentist yearly [Toothpaste] : Primary Fluoride Source: Toothpaste [Child Cooperates] : Child cooperates [Parent has appropriate responses to behavior] : Parent has appropriate responses to behavior [In Pre-K] : In Pre-K [Adequate performance] : Adequate performance [Adequate attention] : Adequate attention [No difficulties with Homework] : No difficulties with homework  [No] : Not at  exposure [Water heater temperature set at <120 degrees F] : Water heater temperature set at <120 degrees F cbc, cmp, hgba1c done 4/08/19 [Car seat in back seat] : Car seat in back seat [Carbon Monoxide Detectors] : Carbon monoxide detectors [Smoke Detectors] : Smoke detectors [Supervised outdoor play] : Supervised outdoor play [NO] : No [Exposure to electronic nicotine delivery system] : No exposure to electronic nicotine delivery system

## 2024-08-13 ENCOUNTER — APPOINTMENT (OUTPATIENT)
Dept: UROGYNECOLOGY | Facility: CLINIC | Age: 59
End: 2024-08-13
Payer: MEDICARE

## 2024-08-13 DIAGNOSIS — N94.89 OTHER SPECIFIED CONDITIONS ASSOCIATED WITH FEMALE GENITAL ORGANS AND MENSTRUAL CYCLE: ICD-10-CM

## 2024-08-13 DIAGNOSIS — N39.41 URGE INCONTINENCE: ICD-10-CM

## 2024-08-13 PROCEDURE — 99213 OFFICE O/P EST LOW 20 MIN: CPT

## 2024-08-13 NOTE — HISTORY OF PRESENT ILLNESS
[FreeTextEntry1] : Monet presents for f/u urinary incontinence and longstanding h/o vaginal burning despite vaginal estrogen use. Pt was doing well with pelvic floor PT earlier in the year from urinary perspective. She stopped in May and her symptoms returned a few weeks later. Admits to not continuing with pelvic floor exercises techniques learned in PT but has been doing dilator therapy. She has s/p normal UDS. She is mostly bothered by persistent chronic vaginal discomfort. Denies any dysuria.

## 2024-08-13 NOTE — DISCUSSION/SUMMARY
[FreeTextEntry1] : #Urinary incontinence - Discussed symptoms, negative UDS - Discussed options for pharmacotherapy, pt would like to resume pelvic floor exercises on her own for now and potentially restart PT if ineffective before exploring other alternatives. - Follow-up in 6-8 weeks.  #Chronic vaginal discomfort/burning - Referral to Dr. Dey

## 2024-08-15 ENCOUNTER — NON-APPOINTMENT (OUTPATIENT)
Age: 59
End: 2024-08-15

## 2024-11-04 NOTE — ASU DISCHARGE PLAN (ADULT/PEDIATRIC) - "IF YOU OR YOUR GUARDIAN/FAMILY IS A SMOKER, IT IS IMPORTANT FOR YOUR HEALTH TO STOP SMOKING. PLEASE BE AWARE THAT SECOND HAND SMOKE IS ALSO HARMFUL."
73 years old male with a PMHx of peripheral neuropathy, microdiscectomy, eczema and SCC of left gingivolabial sulcus s/p L SND 1-3, excision of gingival lesion and marginal mandibulectomy, L RFFF w/ STSG from L thigh on 10/24. Patient was discharged on 11/1/2024 and came back to ED this morning concerns for discharged from his mouth. Reported left neck is street than yesterday. PRS saw and noticed some dehiscence from the left arm flap, started wet to dry. CT scan performed showing collection at surgical site now s/p I&D by PRS team.  Statement Selected yes

## 2024-12-23 ENCOUNTER — APPOINTMENT (OUTPATIENT)
Dept: ORTHOPEDIC SURGERY | Facility: CLINIC | Age: 59
End: 2024-12-23
Payer: COMMERCIAL

## 2024-12-23 VITALS — HEIGHT: 60 IN | WEIGHT: 128 LBS | BODY MASS INDEX: 25.13 KG/M2

## 2024-12-23 DIAGNOSIS — M17.12 UNILATERAL PRIMARY OSTEOARTHRITIS, LEFT KNEE: ICD-10-CM

## 2024-12-23 PROCEDURE — 99205 OFFICE O/P NEW HI 60 MIN: CPT | Mod: 25

## 2024-12-23 PROCEDURE — 20610 DRAIN/INJ JOINT/BURSA W/O US: CPT | Mod: LT

## 2024-12-23 PROCEDURE — 73564 X-RAY EXAM KNEE 4 OR MORE: CPT | Mod: LT

## 2025-01-14 ENCOUNTER — APPOINTMENT (OUTPATIENT)
Dept: ORTHOPEDIC SURGERY | Facility: CLINIC | Age: 60
End: 2025-01-14
Payer: MEDICARE

## 2025-01-14 PROCEDURE — 73564 X-RAY EXAM KNEE 4 OR MORE: CPT | Mod: RT

## 2025-01-14 PROCEDURE — 99214 OFFICE O/P EST MOD 30 MIN: CPT | Mod: 25

## 2025-01-14 PROCEDURE — 20611 DRAIN/INJ JOINT/BURSA W/US: CPT | Mod: LT

## 2025-01-17 NOTE — ASU PREOP CHECKLIST - ADVANCE DIRECTIVE ADDRESSED/READDRESSED
Answers submitted by the patient for this visit:  Primary Reason for Visit (Submitted on 1/15/2025)  What is the primary reason for your visit?: Rash  Rash Questionnaire (Submitted on 1/15/2025)  Chief Complaint: Rash  Chronicity: recurrent  Onset: in the past 7 days  Progression since onset: unchanged  Affected locations: back, right arm  Characteristics: redness, itchiness  Exposed to: emotional stress  anorexia: No  congestion: No  cough: Yes  diarrhea: No  facial edema: No  fatigue: Yes  fever: No  joint pain: No  nail changes: Yes  rhinorrhea: Yes  shortness of breath: No  sore throat: Yes  vomiting: No  Subjective   Sandra Gonzalez is a 76 y.o. female.     Chief Complaint   Patient presents with    Animal Bite     Follow up on the cat bite on her right forearm. Patients arm is looking much better today.          Current Outpatient Medications:     amLODIPine (NORVASC) 2.5 MG tablet, TAKE 3 TABLETS BY MOUTH EVERY DAY, Disp: 90 tablet, Rfl: 0    cetirizine (zyrTEC) 10 MG tablet, Take 1 tablet by mouth 2 (Two) Times a Day As Needed for Allergies., Disp: 60 tablet, Rfl: 0    hydrOXYzine (ATARAX) 25 MG tablet, Take 1 tablet by mouth Daily As Needed for Itching., Disp: 40 tablet, Rfl: 0    metroNIDAZOLE (FLAGYL) 500 MG tablet, Take 1 tablet by mouth 3 (Three) Times a Day for 7 days., Disp: 21 tablet, Rfl: 0    sulfamethoxazole-trimethoprim (BACTRIM DS,SEPTRA DS) 800-160 MG per tablet, Take 1 tablet by mouth 2 (Two) Times a Day for 7 days., Disp: 14 tablet, Rfl: 0    triamcinolone (KENALOG) 0.1 % cream, Apply 1 application topically to the appropriate area as directed 2 (Two) Times a Day., Disp: 80 g, Rfl: 0    Past Medical History:   Diagnosis Date    Achilles tendinitis     Bladder Cancer     2016/ 2019 Bladder Tumor Sx----------Dr. Saldana     Cervical cancer     CHOL     DDD, lumbar     DEXA     2013 = (0.8/ -0.1)/ 2020= (-1.2/ -1.7); 2024= (-1.1/ 1.2)    Eczema     HL (hearing loss)     Wear hearing aids    HTN      Lower back pain     MAMMO     NEG = 2019/ 2020/ 2021/ 2022/ 2024    Mitral valve prolapse     Parathyroid adenoma     s/p Sx    Pulmonary nodule     Renal Cell Cancer     S/P Left Nephrectomy    Rheumatic fever     Scoliosis     Vitamin D deficiency        Past Surgical History:   Procedure Laterality Date    ADENOIDECTOMY      APPENDECTOMY      BLADDER TUMOR EXCISION  05/2019    x 2    CHOLECYSTECTOMY      COLONOSCOPY      2018= NEG, rech 2028?         GSI    NEPHRECTOMY  09/2009    left nephrectomy    PARATHYROID GLAND SURGERY  12/2020    TONSILLECTOMY      TOTAL ABDOMINAL HYSTERECTOMY WITH SALPINGO OOPHORECTOMY         Family History   Problem Relation Age of Onset    Cancer Mother         She had gallbladder cancer, liver    Gallbladder disease Mother         CANCER    Cancer Father 77        Brain tumor - cancer    No Known Problems Sister     No Known Problems Brother        Social History     Socioeconomic History    Marital status:    Tobacco Use    Smoking status: Never     Passive exposure: Never    Smokeless tobacco: Never   Vaping Use    Vaping status: Never Used   Substance and Sexual Activity    Alcohol use: No    Drug use: No    Sexual activity: Yes     Partners: Male     Birth control/protection: None     Comment: I am 73 years old not worried about getting pregnant.       History of Present Illness  The patient is a 76-year-old female here for follow-up on a right forearm cat bite injury.    She has been on a regimen of antibiotics since Monday, with the initial dose administered at the clinic on Sunday. She took another dose that night. She has 2 more days of antibiotics left. She took one today after breakfast. She reports significant improvement in the condition of her right forearm, with no observed changes in the taste of her water intake. She expresses a desire to have her inflammatory markers re-evaluated due to their elevated levels during her last visit. She also notes an overall  improvement in her health status compared to the previous day.    She has been experiencing constipation, which she attributes to the antibiotic treatment. She has not taken any stool softeners for this issue. She typically has regular bowel movements daily, usually after consuming coffee. She reports no recent history of constipation, although she did experience it several years ago, necessitating the use of laxatives. She confirms that she does not have a yeast infection.    MEDICATIONS  Current: Flagyl, Bactrim        The following portions of the patient's history were reviewed and updated as appropriate: allergies, current medications, past family history, past medical history, past social history, past surgical history and problem list.    Review of Systems   Constitutional:  Positive for fatigue. Negative for fever.   HENT:  Positive for rhinorrhea and sore throat. Negative for congestion.    Respiratory:  Positive for cough. Negative for shortness of breath.    Gastrointestinal:  Negative for diarrhea and vomiting.   Skin:  Positive for rash.       Vitals:    01/17/25 1102   BP: 123/72   Pulse: 84   Resp: 14   Temp: 98.2 °F (36.8 °C)   SpO2: 99%       Objective   Physical Exam  Vitals and nursing note reviewed.   Constitutional:       General: She is not in acute distress.     Appearance: She is not ill-appearing or toxic-appearing.   HENT:      Head: Normocephalic and atraumatic.   Pulmonary:      Effort: Pulmonary effort is normal.   Musculoskeletal:      Right forearm: Swelling and tenderness present.      Comments: +Rt dorsal forearm w/ 80% decrease in erythema/ swelling from prev appt   Skin:     Findings: Erythema present. No rash.   Neurological:      Mental Status: She is alert and oriented to person, place, and time.      Cranial Nerves: No cranial nerve deficit.   Psychiatric:         Mood and Affect: Mood normal.         Behavior: Behavior normal.         Thought Content: Thought content normal.          Judgment: Judgment normal.       Physical Exam  There is an 80% decrease in erythema and swelling on the right forearm from the previous appointment.            Assessment & Plan   Diagnoses and all orders for this visit:    1. Cat bite, subsequent encounter (Primary)  -     C-reactive Protein    2. CRP elevated      Assessment & Plan  1. Cat bite injury on the right forearm.  There is a significant improvement in her condition, with an 80% reduction in erythema and swelling compared to the previous visit. She has been on antibiotics since Monday, with doses taken on Sunday, Monday, Tuesday, Wednesday, Thursday, and Friday. She reports no issues with Flagyl or metallic taste in her water. She has been advised to avoid alcohol while on Flagyl. A CRP test will be conducted today to monitor the inflammatory marker, which was previously high. If she feels the need for additional antibiotics on Monday, a prescription can be ordered.    2. Constipation.  She reports constipation, likely due to the antibiotics. She is advised to take a stool softener until the completion of her antibiotic course.     Results            Patient or patient representative verbalized consent for the use of Ambient Listening during the visit with  Natalie Garcia DO for chart documentation. 1/17/2025  11:15 EST   HCP at home/done

## 2025-01-23 ENCOUNTER — APPOINTMENT (OUTPATIENT)
Dept: ORTHOPEDIC SURGERY | Facility: CLINIC | Age: 60
End: 2025-01-23
Payer: MEDICARE

## 2025-01-23 DIAGNOSIS — M17.0 BILATERAL PRIMARY OSTEOARTHRITIS OF KNEE: ICD-10-CM

## 2025-01-23 PROCEDURE — 20611 DRAIN/INJ JOINT/BURSA W/US: CPT | Mod: RT

## 2025-03-06 ENCOUNTER — APPOINTMENT (OUTPATIENT)
Dept: ORTHOPEDIC SURGERY | Facility: CLINIC | Age: 60
End: 2025-03-06

## 2025-03-12 ENCOUNTER — APPOINTMENT (OUTPATIENT)
Dept: ORTHOPEDIC SURGERY | Facility: CLINIC | Age: 60
End: 2025-03-12
Payer: MEDICARE

## 2025-03-12 DIAGNOSIS — M17.0 BILATERAL PRIMARY OSTEOARTHRITIS OF KNEE: ICD-10-CM

## 2025-03-12 PROCEDURE — 99213 OFFICE O/P EST LOW 20 MIN: CPT

## 2025-05-06 NOTE — ASU PATIENT PROFILE, ADULT - AS [facility]
[Joint Pain] : joint pain [Negative] : Endocrine [Joint Stiffness] : no joint stiffness [Joint Swelling] : no joint swelling [Fever] : no fever [Chills] : no chills (4) no limitation

## 2025-06-03 ENCOUNTER — EMERGENCY (EMERGENCY)
Facility: HOSPITAL | Age: 60
LOS: 1 days | End: 2025-06-03
Attending: EMERGENCY MEDICINE | Admitting: EMERGENCY MEDICINE
Payer: MEDICARE

## 2025-06-03 VITALS
DIASTOLIC BLOOD PRESSURE: 76 MMHG | TEMPERATURE: 98 F | WEIGHT: 119.93 LBS | SYSTOLIC BLOOD PRESSURE: 132 MMHG | OXYGEN SATURATION: 99 % | RESPIRATION RATE: 18 BRPM | HEART RATE: 93 BPM | HEIGHT: 61 IN

## 2025-06-03 DIAGNOSIS — Z98.89 OTHER SPECIFIED POSTPROCEDURAL STATES: Chronic | ICD-10-CM

## 2025-06-03 DIAGNOSIS — Z98.51 TUBAL LIGATION STATUS: Chronic | ICD-10-CM

## 2025-06-03 DIAGNOSIS — Z98.890 OTHER SPECIFIED POSTPROCEDURAL STATES: Chronic | ICD-10-CM

## 2025-06-03 DIAGNOSIS — S62.102A FRACTURE OF UNSPECIFIED CARPAL BONE, LEFT WRIST, INITIAL ENCOUNTER FOR CLOSED FRACTURE: Chronic | ICD-10-CM

## 2025-06-03 DIAGNOSIS — N39.3 STRESS INCONTINENCE (FEMALE) (MALE): Chronic | ICD-10-CM

## 2025-06-03 DIAGNOSIS — F33.2 MAJOR DEPRESSIVE DISORDER, RECURRENT SEVERE WITHOUT PSYCHOTIC FEATURES: ICD-10-CM

## 2025-06-03 DIAGNOSIS — Z90.710 ACQUIRED ABSENCE OF BOTH CERVIX AND UTERUS: Chronic | ICD-10-CM

## 2025-06-03 DIAGNOSIS — N31.8 OTHER NEUROMUSCULAR DYSFUNCTION OF BLADDER: Chronic | ICD-10-CM

## 2025-06-03 LAB
ALBUMIN SERPL ELPH-MCNC: 3.8 G/DL — SIGNIFICANT CHANGE UP (ref 3.3–5)
ALP SERPL-CCNC: 125 U/L — HIGH (ref 40–120)
ALT FLD-CCNC: 31 U/L — SIGNIFICANT CHANGE UP (ref 10–45)
ANION GAP SERPL CALC-SCNC: 9 MMOL/L — SIGNIFICANT CHANGE UP (ref 5–17)
APAP SERPL-MCNC: <1 UG/ML — LOW (ref 10–30)
APPEARANCE UR: CLEAR — SIGNIFICANT CHANGE UP
AST SERPL-CCNC: 14 U/L — SIGNIFICANT CHANGE UP (ref 10–40)
BASOPHILS # BLD AUTO: 0.02 K/UL — SIGNIFICANT CHANGE UP (ref 0–0.2)
BASOPHILS NFR BLD AUTO: 0.4 % — SIGNIFICANT CHANGE UP (ref 0–2)
BILIRUB SERPL-MCNC: 0.2 MG/DL — SIGNIFICANT CHANGE UP (ref 0.2–1.2)
BILIRUB UR-MCNC: NEGATIVE — SIGNIFICANT CHANGE UP
BUN SERPL-MCNC: 11 MG/DL — SIGNIFICANT CHANGE UP (ref 7–23)
CALCIUM SERPL-MCNC: 9.8 MG/DL — SIGNIFICANT CHANGE UP (ref 8.4–10.5)
CHLORIDE SERPL-SCNC: 103 MMOL/L — SIGNIFICANT CHANGE UP (ref 96–108)
CO2 SERPL-SCNC: 28 MMOL/L — SIGNIFICANT CHANGE UP (ref 22–31)
COLOR SPEC: YELLOW — SIGNIFICANT CHANGE UP
CREAT SERPL-MCNC: 0.66 MG/DL — SIGNIFICANT CHANGE UP (ref 0.5–1.3)
DIFF PNL FLD: NEGATIVE — SIGNIFICANT CHANGE UP
EGFR: 100 ML/MIN/1.73M2 — SIGNIFICANT CHANGE UP
EGFR: 100 ML/MIN/1.73M2 — SIGNIFICANT CHANGE UP
EOSINOPHIL # BLD AUTO: 0.08 K/UL — SIGNIFICANT CHANGE UP (ref 0–0.5)
EOSINOPHIL NFR BLD AUTO: 1.6 % — SIGNIFICANT CHANGE UP (ref 0–6)
ETHANOL SERPL-MCNC: <3 MG/DL — SIGNIFICANT CHANGE UP (ref 0–3)
GLUCOSE SERPL-MCNC: 116 MG/DL — HIGH (ref 70–99)
GLUCOSE UR QL: NEGATIVE MG/DL — SIGNIFICANT CHANGE UP
HCT VFR BLD CALC: 38.4 % — SIGNIFICANT CHANGE UP (ref 34.5–45)
HGB BLD-MCNC: 13 G/DL — SIGNIFICANT CHANGE UP (ref 11.5–15.5)
IMM GRANULOCYTES NFR BLD AUTO: 0.4 % — SIGNIFICANT CHANGE UP (ref 0–0.9)
KETONES UR QL: NEGATIVE MG/DL — SIGNIFICANT CHANGE UP
LEUKOCYTE ESTERASE UR-ACNC: ABNORMAL
LYMPHOCYTES # BLD AUTO: 1.07 K/UL — SIGNIFICANT CHANGE UP (ref 1–3.3)
LYMPHOCYTES # BLD AUTO: 21.5 % — SIGNIFICANT CHANGE UP (ref 13–44)
MCHC RBC-ENTMCNC: 33.1 PG — SIGNIFICANT CHANGE UP (ref 27–34)
MCHC RBC-ENTMCNC: 33.9 G/DL — SIGNIFICANT CHANGE UP (ref 32–36)
MCV RBC AUTO: 97.7 FL — SIGNIFICANT CHANGE UP (ref 80–100)
MONOCYTES # BLD AUTO: 0.41 K/UL — SIGNIFICANT CHANGE UP (ref 0–0.9)
MONOCYTES NFR BLD AUTO: 8.2 % — SIGNIFICANT CHANGE UP (ref 2–14)
NEUTROPHILS # BLD AUTO: 3.38 K/UL — SIGNIFICANT CHANGE UP (ref 1.8–7.4)
NEUTROPHILS NFR BLD AUTO: 67.9 % — SIGNIFICANT CHANGE UP (ref 43–77)
NITRITE UR-MCNC: NEGATIVE — SIGNIFICANT CHANGE UP
NRBC BLD AUTO-RTO: 0 /100 WBCS — SIGNIFICANT CHANGE UP (ref 0–0)
PCP SPEC-MCNC: SIGNIFICANT CHANGE UP
PH UR: 8 — SIGNIFICANT CHANGE UP (ref 5–8)
PLATELET # BLD AUTO: 260 K/UL — SIGNIFICANT CHANGE UP (ref 150–400)
POTASSIUM SERPL-MCNC: 3.7 MMOL/L — SIGNIFICANT CHANGE UP (ref 3.5–5.3)
POTASSIUM SERPL-SCNC: 3.7 MMOL/L — SIGNIFICANT CHANGE UP (ref 3.5–5.3)
PROT SERPL-MCNC: 7.3 G/DL — SIGNIFICANT CHANGE UP (ref 6–8.3)
PROT UR-MCNC: NEGATIVE MG/DL — SIGNIFICANT CHANGE UP
RBC # BLD: 3.93 M/UL — SIGNIFICANT CHANGE UP (ref 3.8–5.2)
RBC # FLD: 13.2 % — SIGNIFICANT CHANGE UP (ref 10.3–14.5)
SALICYLATES SERPL-MCNC: 0.9 MG/DL — LOW (ref 3–30)
SARS-COV-2 RNA SPEC QL NAA+PROBE: SIGNIFICANT CHANGE UP
SODIUM SERPL-SCNC: 140 MMOL/L — SIGNIFICANT CHANGE UP (ref 135–145)
SP GR SPEC: 1.01 — SIGNIFICANT CHANGE UP (ref 1–1.03)
TSH SERPL-MCNC: 1.52 UIU/ML — SIGNIFICANT CHANGE UP (ref 0.36–3.74)
UROBILINOGEN FLD QL: 0.2 MG/DL — SIGNIFICANT CHANGE UP (ref 0.2–1)
WBC # BLD: 4.98 K/UL — SIGNIFICANT CHANGE UP (ref 3.8–10.5)
WBC # FLD AUTO: 4.98 K/UL — SIGNIFICANT CHANGE UP (ref 3.8–10.5)

## 2025-06-03 PROCEDURE — 90792 PSYCH DIAG EVAL W/MED SRVCS: CPT | Mod: 2W

## 2025-06-03 PROCEDURE — 99285 EMERGENCY DEPT VISIT HI MDM: CPT

## 2025-06-03 PROCEDURE — 93010 ELECTROCARDIOGRAM REPORT: CPT

## 2025-06-03 RX ORDER — CEFUROXIME SODIUM 1.5 G
500 VIAL (EA) INJECTION ONCE
Refills: 0 | Status: COMPLETED | OUTPATIENT
Start: 2025-06-03 | End: 2025-06-03

## 2025-06-03 RX ADMIN — Medication 500 MILLIGRAM(S): at 22:18

## 2025-06-03 NOTE — ED BEHAVIORAL HEALTH ASSESSMENT NOTE - DESCRIPTION
DM, HLD, asthma, hypothyroid, fibromyalgia see above In the ER pt tearful though in behavioral control

## 2025-06-03 NOTE — ED BEHAVIORAL HEALTH ASSESSMENT NOTE - CURRENT MEDICATION
xanax, nortriptyline, doesn't remember the names of her medical meds xanax 1 tid prn, nortriptyline, doesn't remember the names of her medical meds

## 2025-06-03 NOTE — ED PROVIDER NOTE - OBJECTIVE STATEMENT
60-year-old female with history of diabetes, hypothyroidism, asthma, depression, anxiety, panic attacks, fibromyalgia, brought herself to the ED today for depression and suicidal thoughts.  She had thoughts of hurting herself with her 's knives.  She states she  has a lot of stress.  She states she is not eating well.  She states she does not want to see anybody, does not want to see her family.  Denies any medical complaints

## 2025-06-03 NOTE — ED BEHAVIORAL HEALTH ASSESSMENT NOTE - SUMMARY
Overall pt's current presentation is most c/w MDD, r/o panic d/o.  On exam she is dysphoric, tearful, exasperated, reports ongoing wish to be dead and requests vol admission for which she is appropriate.  Pt may not meet 2PC criteria ultimately, however if she requests d/c she should be re-evaluated by psych as currently given her level of tearfulness/hopelessness she wasn't able to engage in safety planning.

## 2025-06-03 NOTE — ED BEHAVIORAL HEALTH ASSESSMENT NOTE - HPI (INCLUDE ILLNESS QUALITY, SEVERITY, DURATION, TIMING, CONTEXT, MODIFYING FACTORS, ASSOCIATED SIGNS AND SYMPTOMS)
Pt reports a h/o depression and panic attacks.  States she takes Xanax for panic attacks, isn't taking anything for depression.  Today she came to the hospital because she's feeling suicidal.  States she has a knife in her room that's her 's but denies suicidal intent.  Reports ongoing thoughts of wanting to be dead, though denies actual thoughts of active suicide.  States she's been isolative.  Does report "problems" but is vague about stressors.      Notes poor sleep, taking xanax to help.  Denies fatigue.  States she's having poor PO 2-3 days.  Denies HI.  Denies manic sx.  Denies ALMA, PI.    Is interested in coming into the hospital for treatment. Pt is a 61 yo woman, , helps take care of her father, on disability, PPH of MDD and panic attacks, in outpt tx w a psychiatrist (doesn't remember his name, qasim VIEIRA has a script from Binh Valles), PMH of DM, HLD, asthma, hypothyroid, fibromyalgia, denies past violence/NSSI/SA/substance, BIBS for depression and SI.    Of note pt very distraught and tearful on exam and has difficulty providing clear timeline and narrative of her sx/HPI.     Pt reports a h/o depression and panic attacks.  States she takes Xanax for panic attacks, isn't taking anything for depression.  Today she came to the hospital because she's feeling suicidal.  States she has a knife in her room that's her 's that she's been thinking about cutting herself with but denies suicidal intent.  Reports ongoing thoughts of wanting to be dead, though denies actual thoughts of active suicide currently.  States she's been isolative.  Does report "problems" but is vague about stressors.      Notes poor sleep, taking xanax to help.  Denies fatigue.  States she's having poor PO the past 2-3 days.  Denies HI.  Denies manic sx.  Denies ALMA, PI.    Is interested in coming into the hospital for treatment.    GURDEEP spoke to pt's father and daughter.  He states that there's been marital discord since Jan 2025.  He states she's been more depressed since then.  They state she's prescribed Prozac but not taking it. Thinks she may also be on gabapentin and xanax.  In Sept 2024 was seeing a psychiatrist in [facility] however isn't sure what she was taking.  She's now on disability for fibromyalgia.  He isn't aware of any SI/HI, however she has been more isolative/depressed.  They state she's been maintaining hygiene.  They states she's eating/sleeping ok as far as they know.    iSTOP: MITCHELL PETERSON	05/14/2025	05/16/2025	alprazolam 1 mg tablet	90	30	Binh Valles MD	[id number]	[facility] #[id number]

## 2025-06-03 NOTE — ED PROVIDER NOTE - PHYSICAL EXAMINATION
exam:   General: tearful, NAD.   HEENT: eyes perrl, nose normal, OP no erythema/exudate/swelling.   cor: RRR, s1s2, 2+rad pulses.   lungs: ctabl, no resp distress.   abd: soft, ntnd.   : no cvat  neuro: a&ox3, cn2-12 intact, HARDY, 5/5 strength c nl sensation all extremities, nl coordination.   MSK: no extremity swelling.  Skin: normal, no rash  psych: +SI. no HI/hallucinations

## 2025-06-03 NOTE — ED PROVIDER NOTE - CLINICAL SUMMARY MEDICAL DECISION MAKING FREE TEXT BOX
60-year-old female with history of diabetes, hypothyroidism, asthma, depression, anxiety, panic attacks, fibromyalgia, brought herself to the ED today for depression and suicidal thoughts.  She had thoughts of hurting herself with her 's knives.  She states she  has a lot of stress.  She states she is not eating well.  She states she does not want to see anybody, does not want to see her family.  Denies any medical complaints 60-year-old female with history of diabetes, hypothyroidism, asthma, depression, anxiety, panic attacks, fibromyalgia, brought herself to the ED today for depression and suicidal thoughts.  She had thoughts of hurting herself with her 's knives.  She states she  has a lot of stress.  She states she is not eating well.  She states she does not want to see anybody, does not want to see her family.  Denies any medical complaints    Labs unremarkable except for urinalysis consistent with UTI.  Patient given Ceftin 500 mg.  Recommend Ceftin 500 mg twice daily for 5 days.  EKG unremarkable.  Patient seen by telepsychiatry, recommending patient for voluntary admission. 60-year-old female with history of diabetes, hypothyroidism, asthma, depression, anxiety, panic attacks, fibromyalgia, brought herself to the ED today for depression and suicidal thoughts.  She had thoughts of hurting herself with her 's knives.  She states she  has a lot of stress.  She states she is not eating well.  She states she does not want to see anybody, does not want to see her family.  Denies any medical complaints    Patient tearful, expressing SI, depression.  Will check labs, EKG for medical clearance.  Will consult telepsych.  Partial DDx: Depression, anxiety, drug/alcohol intoxication.    Labs unremarkable except for urinalysis consistent with UTI.  Patient given Ceftin 500 mg.  Recommend Ceftin 500 mg twice daily for 5 days.  EKG unremarkable.  Patient seen by telepsychiatry, recommending patient for voluntary admission.    0700 Patient awaiting inpatient psych bed.  Signed out to Dr. Hoover

## 2025-06-03 NOTE — ED BEHAVIORAL HEALTH ASSESSMENT NOTE - LEGAL HISTORY
Discharge Call Back      Person Contacted: patient    Patient Condition: Unchanged    If applicable, have you made a follow-up appointment or received a call for follow up? No    Recommendation: contact primary care provider immediately.    Do you have any questions about your care in the Emergency Room, pain control or discharge instructions? no               none known

## 2025-06-03 NOTE — ED BEHAVIORAL HEALTH ASSESSMENT NOTE - CURRENTLY ENROLLED STUDENT
No
PAST MEDICAL HISTORY:  Bipolar disorder     DM (diabetes mellitus)     DM (diabetes mellitus)     HTN (hypertension)     Schizoaffective disorder, unspecified type

## 2025-06-03 NOTE — ED ADULT TRIAGE NOTE - CHIEF COMPLAINT QUOTE
Patient complaint of depressed for last year, having panic attack and had thoughts of hurting herself today. Patient complaint of frequent crying. Denies any auditory or visual hallucinations

## 2025-06-03 NOTE — ED BEHAVIORAL HEALTH ASSESSMENT NOTE - NSBHMSEAFFCONG_PSY_A_CORE
Regimen: Venofer 200 mg    CAMACHO Dasilva is supervising provider today.    Reviewed and verified Advanced Directives: No: Patient declined to create/provide document at this time     Nursing Assessment: A focused nursing assessment  was performed and the patient reports the following: Nausea: NO  Vomiting: NO  Fever: NO  Chills: NO  Other signs of infection: NO  Bleeding: NO  Shortness of Breath: NO  Fatigue/Weakness: YES  Pain: NO    Pre-Treatment: - Patient has valid pre-authorization  - VS completed  - Treatment parameters verified in patient protocol  - Patient is identified by first & last name, Date of birth that has been verified with the patient chairside.    Treatment: Refer to [facility] and MAR for line assessment and medication administration, Blood return confirmed before, during and after treatment administered, Infusion pump used for non-vesicant drugs, and Extravasation/Infiltration: None     Post Treatment: Treatment tolerated well; no adverse reaction    Education: No new instructions needed    Next appointment scheduled:   Future Appointments   Date Time Provider Department Center   12/21/2023  1:00 PM [facility] TREATMENT CHAIR [facility]   12/26/2023  1:00 PM [facility] TREATMENT CHAIR [facility]   2/13/2024  7:30 AM [facility]   4/16/2024  7:30 AM [facility]   6/11/2024  9:00 AM [facility]   6/11/2024  9:30 AM Navi Goodman MD [facility]       Patient instructed to call the office with any questions or concerns.    Patient Discharged: patient discharged to home per self, ambulatory     Congruent

## 2025-06-03 NOTE — ED ADULT NURSE NOTE - OBJECTIVE STATEMENT
Pt presents to ED from home for depression. Pt states she has been having issues at home and has been depressed lately and crying everyday. She endorses thoughts of wanting to end her life. Denies HI. Pt tearful upon assessment. Denies etoh or drug use.

## 2025-06-04 ENCOUNTER — INPATIENT (INPATIENT)
Facility: HOSPITAL | Age: 60
LOS: 19 days | Discharge: HOME | End: 2025-06-24
Attending: PSYCHIATRY & NEUROLOGY | Admitting: PSYCHIATRY & NEUROLOGY
Payer: MEDICARE

## 2025-06-04 VITALS
DIASTOLIC BLOOD PRESSURE: 83 MMHG | HEART RATE: 90 BPM | RESPIRATION RATE: 18 BRPM | TEMPERATURE: 99 F | OXYGEN SATURATION: 99 % | SYSTOLIC BLOOD PRESSURE: 161 MMHG

## 2025-06-04 VITALS — HEIGHT: 62 IN | RESPIRATION RATE: 16 BRPM | TEMPERATURE: 98 F | WEIGHT: 117.95 LBS

## 2025-06-04 DIAGNOSIS — Z98.890 OTHER SPECIFIED POSTPROCEDURAL STATES: Chronic | ICD-10-CM

## 2025-06-04 DIAGNOSIS — Z98.89 OTHER SPECIFIED POSTPROCEDURAL STATES: Chronic | ICD-10-CM

## 2025-06-04 DIAGNOSIS — N39.3 STRESS INCONTINENCE (FEMALE) (MALE): Chronic | ICD-10-CM

## 2025-06-04 DIAGNOSIS — N31.8 OTHER NEUROMUSCULAR DYSFUNCTION OF BLADDER: Chronic | ICD-10-CM

## 2025-06-04 DIAGNOSIS — S62.102A FRACTURE OF UNSPECIFIED CARPAL BONE, LEFT WRIST, INITIAL ENCOUNTER FOR CLOSED FRACTURE: Chronic | ICD-10-CM

## 2025-06-04 DIAGNOSIS — Z98.51 TUBAL LIGATION STATUS: Chronic | ICD-10-CM

## 2025-06-04 DIAGNOSIS — Z90.710 ACQUIRED ABSENCE OF BOTH CERVIX AND UTERUS: Chronic | ICD-10-CM

## 2025-06-04 DIAGNOSIS — F33.9 MAJOR DEPRESSIVE DISORDER, RECURRENT, UNSPECIFIED: ICD-10-CM

## 2025-06-04 LAB
GLUCOSE BLDC GLUCOMTR-MCNC: 102 MG/DL — HIGH (ref 70–99)
GLUCOSE BLDC GLUCOMTR-MCNC: 106 MG/DL — HIGH (ref 70–99)
GLUCOSE BLDC GLUCOMTR-MCNC: 128 MG/DL — HIGH (ref 70–99)

## 2025-06-04 PROCEDURE — 81001 URINALYSIS AUTO W/SCOPE: CPT

## 2025-06-04 PROCEDURE — 80307 DRUG TEST PRSMV CHEM ANLYZR: CPT

## 2025-06-04 PROCEDURE — 99223 1ST HOSP IP/OBS HIGH 75: CPT

## 2025-06-04 PROCEDURE — 80053 COMPREHEN METABOLIC PANEL: CPT

## 2025-06-04 PROCEDURE — 85025 COMPLETE CBC W/AUTO DIFF WBC: CPT

## 2025-06-04 PROCEDURE — 84443 ASSAY THYROID STIM HORMONE: CPT

## 2025-06-04 PROCEDURE — 93005 ELECTROCARDIOGRAM TRACING: CPT

## 2025-06-04 PROCEDURE — 99285 EMERGENCY DEPT VISIT HI MDM: CPT | Mod: 25

## 2025-06-04 PROCEDURE — 87086 URINE CULTURE/COLONY COUNT: CPT

## 2025-06-04 PROCEDURE — 82962 GLUCOSE BLOOD TEST: CPT

## 2025-06-04 PROCEDURE — 87186 SC STD MICRODIL/AGAR DIL: CPT

## 2025-06-04 PROCEDURE — 87635 SARS-COV-2 COVID-19 AMP PRB: CPT

## 2025-06-04 PROCEDURE — 36415 COLL VENOUS BLD VENIPUNCTURE: CPT

## 2025-06-04 RX ORDER — LIPASE/PROTEASE/AMYLASE 10K-37.5K
1 CAPSULE,DELAYED RELEASE (ENTERIC COATED) ORAL ONCE
Refills: 0 | Status: COMPLETED | OUTPATIENT
Start: 2025-06-04 | End: 2025-06-04

## 2025-06-04 RX ORDER — METOPROLOL SUCCINATE 50 MG/1
25 TABLET, EXTENDED RELEASE ORAL ONCE
Refills: 0 | Status: COMPLETED | OUTPATIENT
Start: 2025-06-04 | End: 2025-06-04

## 2025-06-04 RX ORDER — GLUCAGON 3 MG/1
1 POWDER NASAL ONCE
Refills: 0 | Status: DISCONTINUED | OUTPATIENT
Start: 2025-06-04 | End: 2025-06-24

## 2025-06-04 RX ORDER — ONDANSETRON HCL/PF 4 MG/2 ML
4 VIAL (ML) INJECTION EVERY 6 HOURS
Refills: 0 | Status: DISCONTINUED | OUTPATIENT
Start: 2025-06-04 | End: 2025-06-24

## 2025-06-04 RX ORDER — MOXIFLOXACIN HYDROCHLORIDE 5 MG/ML
1 SOLUTION/ DROPS OPHTHALMIC THREE TIMES A DAY
Refills: 0 | Status: COMPLETED | OUTPATIENT
Start: 2025-06-04 | End: 2025-06-09

## 2025-06-04 RX ORDER — SODIUM CHLORIDE 9 G/1000ML
1000 INJECTION, SOLUTION INTRAVENOUS
Refills: 0 | Status: DISCONTINUED | OUTPATIENT
Start: 2025-06-04 | End: 2025-06-04

## 2025-06-04 RX ORDER — CEFUROXIME SODIUM 1.5 G
500 VIAL (EA) INJECTION EVERY 12 HOURS
Refills: 0 | Status: ACTIVE | OUTPATIENT
Start: 2025-06-04 | End: 2026-05-03

## 2025-06-04 RX ORDER — LEVOTHYROXINE SODIUM 300 MCG
88 TABLET ORAL ONCE
Refills: 0 | Status: COMPLETED | OUTPATIENT
Start: 2025-06-04 | End: 2025-06-04

## 2025-06-04 RX ORDER — NORTRIPTYLINE HCL 75 MG
25 CAPSULE ORAL AT BEDTIME
Refills: 0 | Status: DISCONTINUED | OUTPATIENT
Start: 2025-06-04 | End: 2025-06-24

## 2025-06-04 RX ORDER — INSULIN LISPRO 100 U/ML
INJECTION, SOLUTION INTRAVENOUS; SUBCUTANEOUS
Refills: 0 | Status: DISCONTINUED | OUTPATIENT
Start: 2025-06-04 | End: 2025-06-16

## 2025-06-04 RX ORDER — LORAZEPAM 4 MG/ML
1 VIAL (ML) INJECTION ONCE
Refills: 0 | Status: DISCONTINUED | OUTPATIENT
Start: 2025-06-04 | End: 2025-06-04

## 2025-06-04 RX ORDER — ACETAMINOPHEN 500 MG/5ML
650 LIQUID (ML) ORAL EVERY 6 HOURS
Refills: 0 | Status: DISCONTINUED | OUTPATIENT
Start: 2025-06-04 | End: 2025-06-24

## 2025-06-04 RX ORDER — FERROUS SULFATE 137(45) MG
325 TABLET, EXTENDED RELEASE ORAL AT BEDTIME
Refills: 0 | Status: DISCONTINUED | OUTPATIENT
Start: 2025-06-04 | End: 2025-06-24

## 2025-06-04 RX ORDER — ATORVASTATIN CALCIUM 80 MG/1
10 TABLET, FILM COATED ORAL AT BEDTIME
Refills: 0 | Status: DISCONTINUED | OUTPATIENT
Start: 2025-06-04 | End: 2025-06-24

## 2025-06-04 RX ORDER — LEVOTHYROXINE SODIUM 300 MCG
1 TABLET ORAL
Refills: 0 | DISCHARGE

## 2025-06-04 RX ORDER — GABAPENTIN 400 MG/1
300 CAPSULE ORAL AT BEDTIME
Refills: 0 | Status: DISCONTINUED | OUTPATIENT
Start: 2025-06-04 | End: 2025-06-09

## 2025-06-04 RX ORDER — LORAZEPAM 4 MG/ML
0.5 VIAL (ML) INJECTION THREE TIMES A DAY
Refills: 0 | Status: DISCONTINUED | OUTPATIENT
Start: 2025-06-04 | End: 2025-06-09

## 2025-06-04 RX ORDER — DEXTROSE 50 % IN WATER 50 %
25 SYRINGE (ML) INTRAVENOUS ONCE
Refills: 0 | Status: DISCONTINUED | OUTPATIENT
Start: 2025-06-04 | End: 2025-06-04

## 2025-06-04 RX ORDER — ROSUVASTATIN CALCIUM 5 MG/1
5 TABLET, FILM COATED ORAL AT BEDTIME
Refills: 0 | Status: DISCONTINUED | OUTPATIENT
Start: 2025-06-04 | End: 2025-06-04

## 2025-06-04 RX ORDER — FERROUS SULFATE 137(45) MG
325 TABLET, EXTENDED RELEASE ORAL DAILY
Refills: 0 | Status: DISCONTINUED | OUTPATIENT
Start: 2025-06-04 | End: 2025-06-04

## 2025-06-04 RX ORDER — LIPASE/PROTEASE/AMYLASE 10K-37.5K
1 CAPSULE,DELAYED RELEASE (ENTERIC COATED) ORAL
Refills: 0 | Status: DISCONTINUED | OUTPATIENT
Start: 2025-06-04 | End: 2025-06-05

## 2025-06-04 RX ORDER — METOPROLOL SUCCINATE 50 MG/1
25 TABLET, EXTENDED RELEASE ORAL DAILY
Refills: 0 | Status: DISCONTINUED | OUTPATIENT
Start: 2025-06-04 | End: 2025-06-13

## 2025-06-04 RX ORDER — LIPASE/PROTEASE/AMYLASE 10K-37.5K
1 CAPSULE,DELAYED RELEASE (ENTERIC COATED) ORAL
Refills: 0 | DISCHARGE

## 2025-06-04 RX ORDER — METFORMIN HYDROCHLORIDE 850 MG/1
500 TABLET ORAL ONCE
Refills: 0 | Status: COMPLETED | OUTPATIENT
Start: 2025-06-04 | End: 2025-06-04

## 2025-06-04 RX ORDER — SERTRALINE 100 MG/1
25 TABLET, FILM COATED ORAL DAILY
Refills: 0 | Status: DISCONTINUED | OUTPATIENT
Start: 2025-06-04 | End: 2025-06-06

## 2025-06-04 RX ORDER — METFORMIN HYDROCHLORIDE 500 MG/1
500 TABLET ORAL DAILY
Refills: 0 | Status: DISCONTINUED | OUTPATIENT
Start: 2025-06-04 | End: 2025-06-24

## 2025-06-04 RX ORDER — QUININE SULFATE 324 MG/1
0 CAPSULE ORAL
Refills: 0 | DISCHARGE

## 2025-06-04 RX ORDER — DEXTROSE 50 % IN WATER 50 %
15 SYRINGE (ML) INTRAVENOUS ONCE
Refills: 0 | Status: DISCONTINUED | OUTPATIENT
Start: 2025-06-04 | End: 2025-06-24

## 2025-06-04 RX ORDER — ALBUTEROL SULFATE 2.5 MG/3ML
2 VIAL, NEBULIZER (ML) INHALATION EVERY 6 HOURS
Refills: 0 | Status: DISCONTINUED | OUTPATIENT
Start: 2025-06-04 | End: 2025-06-24

## 2025-06-04 RX ORDER — LEVOTHYROXINE SODIUM 300 MCG
88 TABLET ORAL
Refills: 0 | Status: DISCONTINUED | OUTPATIENT
Start: 2025-06-04 | End: 2025-06-24

## 2025-06-04 RX ORDER — LIDOCAINE HYDROCHLORIDE 20 MG/ML
1 JELLY TOPICAL DAILY
Refills: 0 | Status: DISCONTINUED | OUTPATIENT
Start: 2025-06-04 | End: 2025-06-24

## 2025-06-04 RX ADMIN — Medication 500 MILLIGRAM(S): at 07:13

## 2025-06-04 RX ADMIN — Medication 1 MILLIGRAM(S): at 17:00

## 2025-06-04 RX ADMIN — METOPROLOL SUCCINATE 25 MILLIGRAM(S): 50 TABLET, EXTENDED RELEASE ORAL at 09:12

## 2025-06-04 RX ADMIN — Medication 4 MILLIGRAM(S): at 16:59

## 2025-06-04 RX ADMIN — GABAPENTIN 300 MILLIGRAM(S): 400 CAPSULE ORAL at 20:46

## 2025-06-04 RX ADMIN — MOXIFLOXACIN HYDROCHLORIDE 1 DROP(S): 5 SOLUTION/ DROPS OPHTHALMIC at 20:48

## 2025-06-04 RX ADMIN — Medication 1 DROP(S): at 20:46

## 2025-06-04 RX ADMIN — Medication 1 CAPSULE(S): at 09:48

## 2025-06-04 RX ADMIN — Medication 1 DOSE(S): at 20:54

## 2025-06-04 RX ADMIN — Medication 88 MICROGRAM(S): at 09:12

## 2025-06-04 RX ADMIN — ATORVASTATIN CALCIUM 10 MILLIGRAM(S): 80 TABLET, FILM COATED ORAL at 20:46

## 2025-06-04 RX ADMIN — Medication 325 MILLIGRAM(S): at 20:46

## 2025-06-04 RX ADMIN — Medication 0.5 MILLIGRAM(S): at 20:46

## 2025-06-04 RX ADMIN — METFORMIN HYDROCHLORIDE 500 MILLIGRAM(S): 850 TABLET ORAL at 09:13

## 2025-06-04 RX ADMIN — Medication 1 CAPSULE(S): at 18:34

## 2025-06-04 RX ADMIN — Medication 25 MILLIGRAM(S): at 20:46

## 2025-06-04 RX ADMIN — Medication 1 CAPSULE(S): at 09:13

## 2025-06-04 NOTE — CHART NOTE - NSCHARTNOTEFT_GEN_A_CORE
Screening Medical Evaluation    Patient Admitted from:  [facility].     [facility] admitting diagnosis: Recurrent major depressive disorder.      PAST MEDICAL & SURGICAL HISTORY:  Dyslipidemia      Hypothyroid      Asthma with allergic rhinitis      Prediabetes      Gastroparesis      Fibromyalgia      Foot fracture, right  s/p fall on 9/3/2014, no surgery recommended,, foot brace maintained f/u with Dr. Ld Jay (orthopedic)      Stress incontinence in female      Fibroids  uterine      Thyroid nodule  s/p biopsy (benign)      Ovarian cyst  left      Depression  controlled with meds      Left knee pain  pt reports bilateral knee pain      Carpal tunnel syndrome, right upper limb      Trigger finger, right middle finger      Asthma, stable      Type 2 diabetes mellitus      Carpal tunnel syndrome, left upper limb      Trigger finger, left middle finger      Arthritis      H/O diabetic neuropathy      GERD (gastroesophageal reflux disease)      Anxiety      Anemia      COVID-19  HX 3/2020,- NOT  hospitalized,      H/O colposcopy with cervical biopsy      H/O:         H/O bilateral inguinal hernia repair      H/O tubal ligation        H/O colonoscopy with polypectomy  benign      H/O cervical polypectomy      Other functional disorder of bladder  bladder suspension for incontience      Left wrist fracture  10 years ago with repair and placement of pins      Female stress incontinence  sling procedure ; revision x 2 last       H/O abdominal hysterectomy  2014      H/O arthroscopic knee surgery  left      H/O carpal tunnel repair  RIGHT Hand 2017      S/P trigger finger release  RIGHT Middle Finger 2017      S/P carpal tunnel release  left; and left middle trigger finger release; 18      H/O arthroscopy  Right Shoulder       (normal spontaneous vaginal delivery)        History of D&C      H/O thyroidectomy            Allergies    Ultram (Unknown)  lactose (Flatulence)  apple (Unknown)  peanuts (Unknown)  Peaches (Unknown)  cherries (Swelling; Pruritus; Eye Irritation)  Soy (Unknown)  soy milk (Short breath; Anaphylaxis)  Corn (Unknown)  Milk (Unknown)  Originally Entered as [Unknown] reaction to [CHERRIES] (Unknown)    Intolerances          Social History:       FAMILY HISTORY:  Family history of diabetes mellitus (DM) (Father, Mother)    Family history of hypertension (Mother)    Family history of BPH (Father)          MEDICATIONS  (STANDING):  atorvastatin 10 milliGRAM(s) Oral at bedtime  ferrous    sulfate 325 milliGRAM(s) Oral at bedtime  fluticasone propionate/ salmeterol 100-50 MICROgram(s) Diskus 1 Dose(s) Inhalation two times a day  gabapentin 300 milliGRAM(s) Oral at bedtime  glucagon  Injectable 1 milliGRAM(s) IntraMuscular once  insulin lispro (ADMELOG) corrective regimen sliding scale   SubCutaneous three times a day before meals  levothyroxine 88 MICROGram(s) Oral <User Schedule>  LORazepam     Tablet 0.5 milliGRAM(s) Oral three times a day  metFORMIN 500 milliGRAM(s) Oral daily  metoprolol succinate ER 25 milliGRAM(s) Oral daily  moxifloxacin 0.5% Solution 1 Drop(s) Right EYE three times a day  nortriptyline 25 milliGRAM(s) Oral at bedtime  pancrelipase  (CREON 36,000 Lipase Units) 1 Capsule(s) Oral three times a day with meals  pantoprazole    Tablet 40 milliGRAM(s) Oral before breakfast  prednisoLONE acetate 1% Suspension 1 Drop(s) Right EYE three times a day  sertraline 25 milliGRAM(s) Oral daily    MEDICATIONS  (PRN):  acetaminophen     Tablet .. 650 milliGRAM(s) Oral every 6 hours PRN Mild Pain (1 - 3), Moderate Pain (4 - 6), Severe Pain (7 - 10)  albuterol    90 MICROgram(s) HFA Inhaler 2 Puff(s) Inhalation every 6 hours PRN Asthma  dextrose Oral Gel 15 Gram(s) Oral once PRN Blood Glucose LESS THAN 70 milliGRAM(s)/deciliter  EPINEPHrine     1 mG/mL Injectable 0.3 milliGRAM(s) IntraMuscular once PRN ANAPHYLAXIS  lidocaine   4% Patch 1 Patch Transdermal daily PRN Low back pain  ondansetron    Tablet 4 milliGRAM(s) Oral every 6 hours PRN Nausea/vomiting        Vital Signs Last 24 Hrs  T(C): 36.9 (2025 11:20), Max: 37.1 (2025 09:55)  T(F): 98.5 (2025 11:20), Max: 98.8 (2025 09:55)  HR: 90 (2025 09:55) (79 - 90)  BP: 161/83 (2025 09:55) (119/74 - 161/83)  BP(mean): --  RR: 16 (2025 11:20) (16 - 18)  SpO2: 99% (2025 09:55) (99% - 99%)      CAPILLARY BLOOD GLUCOSE      POCT Blood Glucose.: 102 mg/dL (2025 12:45)  POCT Blood Glucose.: 106 mg/dL (2025 07:53)        PHYSICAL EXAM:  GENERAL: NAD.  HEAD:  Atraumatic, Normocephalic  EYES: Wears sunglasses, EOMI, PERRLA, conjunctiva and sclera clear  NECK: Supple, No JVD  CHEST/LUNG: Clear to auscultation bilaterally; No wheeze  HEART: Regular rate and rhythm; No murmurs, rubs, or gallops  ABDOMEN: Positive BS, Soft, Nontender.   EXTREMITIES:  2+ Peripheral Pulses, No clubbing, cyanosis, or edema  PSYCH: Calm and cooperative.   NEUROLOGY: non-focal  SKIN: No rashes or lesions seen on exposed skin.     LABS:                        13.0   4.98  )-----------( 260      ( 2025 19:34 )             38.4     06-03    140  |  103  |  11  ----------------------------<  116[H]  3.7   |  28  |  0.66    Ca    9.8      2025 19:34    TPro  7.3  /  Alb  3.8  /  TBili  0.2  /  DBili  x   /  AST  14  /  ALT  31  /  AlkPhos  125[H]  06-03          Urinalysis Basic - ( 2025 19:45 )    Color: Yellow / Appearance: Clear / S.009 / pH: x  Gluc: x / Ketone: x  / Bili: Negative / Urobili: 0.2 mg/dL   Blood: x / Protein: Negative mg/dL / Nitrite: Negative   Leuk Esterase: Moderate / RBC: 1 /HPF / WBC 10 /HPF   Sq Epi: x / Non Sq Epi: x / Bacteria: Too Numerous to count /HPF        RADIOLOGY & ADDITIONAL TESTS:      Assessment and Plan:  60F admitted to [facility] for Recurrent major depressive disorder.  PMHx of HLD, Hypothyroid, Asthma, DM2, GERD, HTN.  Pt seen for medical screening evaluation. Patient endorses acute on chronic low back pain.  Denies fever, chills, headache, dizziness, lightheadedness, N/V, SOB, cough, congestion, chest pain, abdominal pain, dysuria, hematuria, diarrhea, constipation. Physical exam unremarkable, VSS. Labs above. 6/3 EKG NSR @ 86/ min, QT/ QTC= 378/ 452.      1.) HLD: DASH diet, continue Statin   2.) Low back pain: Lidocaine patch daily PRN, Continue Tylenol po PRN.  3.) Hypothyroid: Continue Synthroid.  4.) Asthma: Controlled Continue inhaler BID, rescue inhaler NM.  5.) DM2: Diabetic diet, FS QID, ISS, Continue metformin.  6.) GERD: Continue PPI.  7.) HTN: DASH diet: Continue BB.  8.) Recurrent major depressive disorder: Plan per primary team.
Pt with dysuria, says she was dx with a UTI at [facility] and is suppose to be on antibiotics.  General NAD  VSS  labs  6/3 UA Moderate LE., epi cells, and numerous bacteria.   A/P   60F admitted for Recurrent major depressive disorder with dysuria and 6/3 UA with moderate LE and epi cells.  - Dysuria: UA, C&S ordered. Continue Tylenol po .

## 2025-06-04 NOTE — BH PATIENT PROFILE - ALLERGIES
Allergies:-  Ultram  apple  Peaches  Corn  Milk  Soy  <Other>  <Other>  lactose  peanuts  <Other>
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
## 2025-06-04 NOTE — CONSULT NOTE ADULT - ASSESSMENT
60F admitted to [facility] for depression, with fatigue, dizziness, nausea.    Plan:  Nausea/gastroparesis:   -may use zofran prn  -pt says she will try to eat when creon available  -small frequent meals     Dizziness:  -likely due to poor PO intake and fatigue from lack of sleep  -VSS without hypotension  -encourage PO intake and monitor    Dysuria:  -UA contaminated with epi cells at , culture is in lab, will follow up  -will treat empirically with macrobid    Urinary incontinence:  -has been seen by uro-gyn, responded to pelvic floor exercises    Cataract surgery  -continue drops as prescribed by her ophthalmologist  -f/u and questions should be directed to her ophthalmologist    Depression:  -management per primary team      DM2:  -continue metformin, monitor FSBG TIDAC + HS  -continue statin for ASCVD prevention    Leg cramps:  -continue gabapentin  -patient is prescribed quinine  -Quinine is not recommended for leg cramps due to side effects and potential serious adverse outcomes such as cardiac arrhythmias, hemolytic uremic syndrome, central nervous system toxicity (such as drowsiness, ataxia, convulsions, and coma), respiratory depression  -common side effects include headache, vasodilation and sweating, nausea, tinnitus, hearing impairment, vertigo or dizziness, blurred vision, and disturbance in color perception. More severe manifestations can include vomiting, diarrhea, abdominal pain, deafness, blindness  -would not continue quinine unless patient reports severe leg cramps and then would only use if she has none of her current symptoms and then not for more than 200mg qhs x 3 days    Fibromyalgia:  -continue gabapentin  -avoid opioids    Hypothyroidism  -s/p thyroidectomy  -continue synthroid, TSH at goal    Asthma  -albuterol prn, no wheeze currently       60F admitted to [facility] for depression, with fatigue, dizziness, nausea.    Plan:  Nausea/gastroparesis:   -may use zofran prn  -pt says she will try to eat when creon available  -small frequent meals     Dizziness:  -likely due to poor PO intake and fatigue from lack of sleep  -VSS without hypotension  -encourage PO intake and monitor    Dysuria:  -UA contaminated with epi cells at , culture is in lab, will follow up  -will treat empirically with macrobid    HTN:  -continue toprol    DM2:  -continue metformin, monitor FSBG TIDAC + HS  -continue statin for ASCVD prevention    Leg cramps:  -continue gabapentin  -patient is prescribed quinine  -Quinine is not recommended for leg cramps due to side effects and potential serious adverse outcomes such as cardiac arrhythmias, hemolytic uremic syndrome, central nervous system toxicity (such as drowsiness, ataxia, convulsions, and coma), respiratory depression  -common side effects include headache, vasodilation and sweating, nausea, tinnitus, hearing impairment, vertigo or dizziness, blurred vision, and disturbance in color perception. More severe manifestations can include vomiting, diarrhea, abdominal pain, deafness, blindness  -would not continue quinine unless patient reports severe leg cramps and then would only use if she has none of her current symptoms and then not for more than 200mg qhs x 3 days    Fibromyalgia:  -continue gabapentin, nortriptyline  -avoid opioids    Hypothyroidism  -s/p thyroidectomy  -continue synthroid, TSH at goal    Asthma  -albuterol prn, no wheeze currently    Urinary incontinence:  -has been seen by uro-gyn, responded to pelvic floor exercises    Cataract surgery  -continue drops as prescribed by her ophthalmologist  -f/u and questions should be directed to her ophthalmologist    Depression:  -management per primary team

## 2025-06-04 NOTE — PHARMACOTHERAPY INTERVENTION NOTE - COMMENTS
Concurrent administration of levothyroxine and other medications within 1 hour may result in decreased levothyroxine effectiveness.  Since levothyroxine administration time defaults to 0600 and pantoprazole administration time defaults to 0730, there is the potential for the medications to be administered together. Added administration instruction to pantoprazole order to "Administer at least 1 hour AFTER levothyroxine."
In monitoring patients on levothyroxine for drug-drug interactions and timing of administration, identified patient with an order for multivitamin which contains calcium and iron, scheduled for daily at 0900. Levothyroxine defaults to daily at 0600. Concurrent use of levothyroxine and iron-, aluminum-, calcium- or magnesium-containing products may result in decreased levothyroxine absorption and must be  from levothyroxine by at least 4 hours.    Recommended adjusting dosing schedule for multivitamin to bedtime to avoid the interaction, as clinically appropriate.

## 2025-06-04 NOTE — BH PATIENT PROFILE - HOME MEDICATIONS
quiNINE , once a day  Creon 36,000 units oral delayed release capsule , 1 cap(s) orally 3 times a day  Synthroid 88 mcg (0.088 mg) oral tablet , 1 tab(s) orally once a day  metFORMIN 500 mg oral tablet , 1 tab(s) orally once a day  ALPRAZolam 1 mg oral tablet , 1 tab(s) orally 3 times a day  nortriptyline 25 mg oral capsule , 1 cap(s) orally At night   cyclobenzaprine 10 mg oral tablet , every night   Metoprolol Succinate ER 25 mg oral tablet, extended release , 1 tab(s) orally once a day  atorvastatin 10 mg oral tablet , orally once a day (at bedtime)  ProAir RespiClick 90 mcg/inh inhalation powder , 2 puff(s) inhaled every 4 hours, As Needed

## 2025-06-04 NOTE — BH PATIENT PROFILE - NSDASAPHYSPTS_PSY_ALL_CORE
Last Med Check: 10/6/2020    Next med check due on: 2021    CSA on File: 2020    Future Appointment Scheduled ? no    Last Med Refill? 30m/15/2020  10m2020    Aquilino Guadalupe MA         No

## 2025-06-04 NOTE — BH INPATIENT PSYCHIATRY ASSESSMENT NOTE - NSBHMETABOLIC_PSY_ALL_CORE_FT
BMI: BMI (kg/m2): 22.7 (06-03-25 @ 18:27)  HbA1c:   Glucose: POCT Blood Glucose.: 106 mg/dL (06-04-25 @ 07:53)    BP: --Vital Signs Last 24 Hrs  T(C): 37.1 (06-04-25 @ 09:55), Max: 37.1 (06-04-25 @ 09:55)  T(F): 98.8 (06-04-25 @ 09:55), Max: 98.8 (06-04-25 @ 09:55)  HR: 90 (06-04-25 @ 09:55) (79 - 93)  BP: 161/83 (06-04-25 @ 09:55) (119/74 - 161/83)  BP(mean): --  RR: 18 (06-04-25 @ 09:55) (18 - 18)  SpO2: 99% (06-04-25 @ 09:55) (99% - 99%)      Lipid Panel:  BMI: BMI (kg/m2): 21.6 (06-04-25 @ 11:20)  HbA1c:   Glucose: POCT Blood Glucose.: 102 mg/dL (06-04-25 @ 12:45)    BP: --Vital Signs Last 24 Hrs  T(C): 36.9 (06-04-25 @ 11:20), Max: 37.1 (06-04-25 @ 09:55)  T(F): 98.5 (06-04-25 @ 11:20), Max: 98.8 (06-04-25 @ 09:55)  HR: 90 (06-04-25 @ 09:55) (79 - 93)  BP: 161/83 (06-04-25 @ 09:55) (119/74 - 161/83)  BP(mean): --  RR: 16 (06-04-25 @ 11:20) (16 - 18)  SpO2: 99% (06-04-25 @ 09:55) (99% - 99%)    Orthostatic VS  06-04-25 @ 11:20  Lying BP: --/-- HR: --  Sitting BP: 144/66 HR: 101  Standing BP: 136/68 HR: 105  Site: --  Mode: --    Lipid Panel:

## 2025-06-04 NOTE — BH INPATIENT PSYCHIATRY ASSESSMENT NOTE - NSBHCHARTREVIEWVS_PSY_A_CORE FT
Vital Signs Last 24 Hrs  T(C): 37.1 (06-04-25 @ 09:55), Max: 37.1 (06-04-25 @ 09:55)  T(F): 98.8 (06-04-25 @ 09:55), Max: 98.8 (06-04-25 @ 09:55)  HR: 90 (06-04-25 @ 09:55) (79 - 93)  BP: 161/83 (06-04-25 @ 09:55) (119/74 - 161/83)  BP(mean): --  RR: 18 (06-04-25 @ 09:55) (18 - 18)  SpO2: 99% (06-04-25 @ 09:55) (99% - 99%)     Vital Signs Last 24 Hrs  T(C): 36.9 (06-04-25 @ 11:20), Max: 37.1 (06-04-25 @ 09:55)  T(F): 98.5 (06-04-25 @ 11:20), Max: 98.8 (06-04-25 @ 09:55)  HR: 90 (06-04-25 @ 09:55) (79 - 93)  BP: 161/83 (06-04-25 @ 09:55) (119/74 - 161/83)  BP(mean): --  RR: 16 (06-04-25 @ 11:20) (16 - 18)  SpO2: 99% (06-04-25 @ 09:55) (99% - 99%)    Orthostatic VS  06-04-25 @ 11:20  Lying BP: --/-- HR: --  Sitting BP: 144/66 HR: 101  Standing BP: 136/68 HR: 105  Site: --  Mode: --

## 2025-06-04 NOTE — BH INPATIENT PSYCHIATRY ASSESSMENT NOTE - NSBHASSESSSUMMFT_PSY_ALL_CORE
Pt is a 61 yo woman, , helps take care of her father, on disability, PPH of MDD and panic attacks, in outpt tx w a psychiatrist (doesn't remember his name, per MANPREETYCKES has a script from Binh Valles), PMH of DM, HLD, asthma, hypothyroid, fibromyalgia, denies past violence/NSSI/SA/substance, BIBS for depression and SI.    On assessment today,     Plan:   -Admit to [facility]   -No CO needed, routine observation, q15 checks   -Psychiatry:   -Medical:   -Group and milieu therapy  -Dispo as per GURDEEP   Pt is a 59 yo woman, , helps take care of her father, on disability, PPH of MDD and panic attacks, in outpt tx w a psychiatrist (doesn't remember his name, per ISHA has a script from Binh Valles), PMH of DM, HLD, asthma, hypothyroid, fibromyalgia, denies past violence/NSSI/SA/substance, BIBS for depression and SI.    On assessment today, patient was tearful, depressed, focused on Xanax. Denied SIIP currently.     Plan:   -Admit to [facility], 9.13  -No CO needed, routine observation, q15 checks   -Psychiatry:   gabapentin 300 milliGRAM(s) Oral at bedtime  LORazepam     Tablet 0.5 milliGRAM(s) Oral three times a day  sertraline 25 milliGRAM(s) Oral daily  -Medical:   atorvastatin 10 milliGRAM(s) Oral at bedtime  ferrous    sulfate 325 milliGRAM(s) Oral at bedtime  fluticasone propionate/ salmeterol 100-50 MICROgram(s) Diskus 1 Dose(s) Inhalation two times a day  glucagon  Injectable 1 milliGRAM(s) IntraMuscular once  insulin lispro (ADMELOG) corrective regimen sliding scale   SubCutaneous three times a day before meals  levothyroxine 88 MICROGram(s) Oral <User Schedule>  metFORMIN 500 milliGRAM(s) Oral daily  metoprolol succinate ER 25 milliGRAM(s) Oral daily  moxifloxacin 0.5% Solution 1 Drop(s) Right EYE three times a day  nortriptyline 25 milliGRAM(s) Oral at bedtime  pancrelipase  (CREON 36,000 Lipase Units) 1 Capsule(s) Oral three times a day with meals  pantoprazole    Tablet 40 milliGRAM(s) Oral before breakfast  prednisoLONE acetate 1% Suspension 1 Drop(s) Right EYE three times a day  acetaminophen     Tablet .. 650 milliGRAM(s) Oral every 6 hours PRN Mild Pain (1 - 3), Moderate Pain (4 - 6), Severe Pain (7 - 10)  albuterol    90 MICROgram(s) HFA Inhaler 2 Puff(s) Inhalation every 6 hours PRN Asthma  dextrose Oral Gel 15 Gram(s) Oral once PRN Blood Glucose LESS THAN 70 milliGRAM(s)/deciliter  EPINEPHrine     1 mG/mL Injectable 0.3 milliGRAM(s) IntraMuscular once PRN ANAPHYLAXIS  ondansetron    Tablet 4 milliGRAM(s) Oral every 6 hours PRN Nausea/vomiting  -Group and milieu therapy  -Dispo as per SW

## 2025-06-04 NOTE — BH INPATIENT PSYCHIATRY ASSESSMENT NOTE - CURRENT MEDICATION
MEDICATIONS  (STANDING):    MEDICATIONS  (PRN):   MEDICATIONS  (STANDING):  atorvastatin 10 milliGRAM(s) Oral at bedtime  ferrous    sulfate 325 milliGRAM(s) Oral at bedtime  fluticasone propionate/ salmeterol 100-50 MICROgram(s) Diskus 1 Dose(s) Inhalation two times a day  gabapentin 300 milliGRAM(s) Oral at bedtime  glucagon  Injectable 1 milliGRAM(s) IntraMuscular once  insulin lispro (ADMELOG) corrective regimen sliding scale   SubCutaneous three times a day before meals  levothyroxine 88 MICROGram(s) Oral <User Schedule>  LORazepam     Tablet 0.5 milliGRAM(s) Oral three times a day  LORazepam     Tablet 1 milliGRAM(s) Oral once  metFORMIN 500 milliGRAM(s) Oral daily  metoprolol succinate ER 25 milliGRAM(s) Oral daily  moxifloxacin 0.5% Solution 1 Drop(s) Right EYE three times a day  nortriptyline 25 milliGRAM(s) Oral at bedtime  pancrelipase  (CREON 36,000 Lipase Units) 1 Capsule(s) Oral three times a day with meals  pantoprazole    Tablet 40 milliGRAM(s) Oral before breakfast  prednisoLONE acetate 1% Suspension 1 Drop(s) Right EYE three times a day  sertraline 25 milliGRAM(s) Oral daily    MEDICATIONS  (PRN):  acetaminophen     Tablet .. 650 milliGRAM(s) Oral every 6 hours PRN Mild Pain (1 - 3), Moderate Pain (4 - 6), Severe Pain (7 - 10)  albuterol    90 MICROgram(s) HFA Inhaler 2 Puff(s) Inhalation every 6 hours PRN Asthma  dextrose Oral Gel 15 Gram(s) Oral once PRN Blood Glucose LESS THAN 70 milliGRAM(s)/deciliter  EPINEPHrine     1 mG/mL Injectable 0.3 milliGRAM(s) IntraMuscular once PRN ANAPHYLAXIS  ondansetron    Tablet 4 milliGRAM(s) Oral every 6 hours PRN Nausea/vomiting   MEDICATIONS  (STANDING):  atorvastatin 10 milliGRAM(s) Oral at bedtime  ferrous    sulfate 325 milliGRAM(s) Oral at bedtime  fluticasone propionate/ salmeterol 100-50 MICROgram(s) Diskus 1 Dose(s) Inhalation two times a day  gabapentin 300 milliGRAM(s) Oral at bedtime  glucagon  Injectable 1 milliGRAM(s) IntraMuscular once  insulin lispro (ADMELOG) corrective regimen sliding scale   SubCutaneous three times a day before meals  levothyroxine 88 MICROGram(s) Oral <User Schedule>  LORazepam     Tablet 0.5 milliGRAM(s) Oral three times a day  metFORMIN 500 milliGRAM(s) Oral daily  metoprolol succinate ER 25 milliGRAM(s) Oral daily  moxifloxacin 0.5% Solution 1 Drop(s) Right EYE three times a day  nortriptyline 25 milliGRAM(s) Oral at bedtime  pancrelipase  (CREON 36,000 Lipase Units) 1 Capsule(s) Oral three times a day with meals  pantoprazole    Tablet 40 milliGRAM(s) Oral before breakfast  prednisoLONE acetate 1% Suspension 1 Drop(s) Right EYE three times a day  sertraline 25 milliGRAM(s) Oral daily    MEDICATIONS  (PRN):  acetaminophen     Tablet .. 650 milliGRAM(s) Oral every 6 hours PRN Mild Pain (1 - 3), Moderate Pain (4 - 6), Severe Pain (7 - 10)  albuterol    90 MICROgram(s) HFA Inhaler 2 Puff(s) Inhalation every 6 hours PRN Asthma  dextrose Oral Gel 15 Gram(s) Oral once PRN Blood Glucose LESS THAN 70 milliGRAM(s)/deciliter  EPINEPHrine     1 mG/mL Injectable 0.3 milliGRAM(s) IntraMuscular once PRN ANAPHYLAXIS  ondansetron    Tablet 4 milliGRAM(s) Oral every 6 hours PRN Nausea/vomiting

## 2025-06-04 NOTE — CONSULT NOTE ADULT - SUBJECTIVE AND OBJECTIVE BOX
HPI:     PAST MEDICAL & SURGICAL HISTORY:  Dyslipidemia      Hypothyroid      Asthma with allergic rhinitis      Prediabetes      Gastroparesis      Fibromyalgia      Foot fracture, right  s/p fall on 9/3/2014, no surgery recommended,, foot brace maintained f/u with Dr. Ld Jay (orthopedic)      Stress incontinence in female      Fibroids  uterine      Thyroid nodule  s/p biopsy (benign)      Ovarian cyst  left      Depression  controlled with meds      Left knee pain  pt reports bilateral knee pain      Carpal tunnel syndrome, right upper limb      Trigger finger, right middle finger      Asthma, stable      Type 2 diabetes mellitus      Carpal tunnel syndrome, left upper limb      Trigger finger, left middle finger      Arthritis      H/O diabetic neuropathy      GERD (gastroesophageal reflux disease)      Anxiety      Anemia      COVID-19  HX 3/2020,- NOT  hospitalized,      H/O colposcopy with cervical biopsy      H/O:         H/O bilateral inguinal hernia repair      H/O tubal ligation        H/O colonoscopy with polypectomy  benign      H/O cervical polypectomy      Other functional disorder of bladder  bladder suspension for incontience      Left wrist fracture  10 years ago with repair and placement of pins      Female stress incontinence  sling procedure ; revision x 2 last       H/O abdominal hysterectomy        H/O arthroscopic knee surgery  left      H/O carpal tunnel repair  RIGHT Hand 2017      S/P trigger finger release  RIGHT Middle Finger 2017      S/P carpal tunnel release  left; and left middle trigger finger release; 17      H/O arthroscopy  Right Shoulder       (normal spontaneous vaginal delivery)        History of D&C      H/O thyroidectomy          Review of Systems:   CONSTITUTIONAL: No fever, weight loss, or fatigue  EYES: No eye pain, visual disturbances, or discharge  ENMT:  No difficulty hearing, tinnitus, vertigo; No sinus or throat pain  NECK: No pain or stiffness  RESPIRATORY: No cough, wheezing, chills or hemoptysis; No shortness of breath  CARDIOVASCULAR: No chest pain, palpitations, dizziness, or leg swelling  GASTROINTESTINAL: No abdominal or epigastric pain. No nausea, vomiting, or hematemesis; No diarrhea or constipation. No melena or hematochezia.  GENITOURINARY: No dysuria, frequency, hematuria, or incontinence  NEUROLOGICAL: No headaches, memory loss, loss of strength, numbness, or tremors  SKIN: No itching, burning, rashes, or lesions   LYMPH NODES: No enlarged glands  ENDOCRINE: No heat or cold intolerance; No hair loss  MUSCULOSKELETAL: No joint pain or swelling; No muscle, back, or extremity pain  HEME/LYMPH: No easy bruising, or bleeding gums  ALLERY AND IMMUNOLOGIC: No hives or eczema    Allergies    Ultram (Unknown)  lactose (Flatulence)  apple (Unknown)  peanuts (Unknown)  Peaches (Unknown)  cherries (Swelling; Pruritus; Eye Irritation)  Soy (Unknown)  soy milk (Short breath; Anaphylaxis)  Corn (Unknown)  Milk (Unknown)  Originally Entered as [Unknown] reaction to [CHERRIES] (Unknown)    Intolerances        Social History:     FAMILY HISTORY:  Family history of diabetes mellitus (DM) (Father, Mother)    Family history of hypertension (Mother)    Family history of BPH (Father)        MEDICATIONS  (STANDING):  atorvastatin 10 milliGRAM(s) Oral at bedtime  dextrose 5%. 1000 milliLiter(s) (50 mL/Hr) IV Continuous <Continuous>  dextrose 50% Injectable 25 Gram(s) IV Push once  ferrous    sulfate 325 milliGRAM(s) Oral at bedtime  fluticasone propionate/ salmeterol 100-50 MICROgram(s) Diskus 1 Dose(s) Inhalation two times a day  gabapentin 300 milliGRAM(s) Oral at bedtime  glucagon  Injectable 1 milliGRAM(s) IntraMuscular once  insulin lispro (ADMELOG) corrective regimen sliding scale   SubCutaneous three times a day before meals  levothyroxine 88 MICROGram(s) Oral <User Schedule>  LORazepam     Tablet 0.5 milliGRAM(s) Oral three times a day  metFORMIN 500 milliGRAM(s) Oral daily  metoprolol succinate ER 25 milliGRAM(s) Oral daily  moxifloxacin 0.5% Solution 1 Drop(s) Right EYE three times a day  nortriptyline 25 milliGRAM(s) Oral at bedtime  pancrelipase  (CREON 36,000 Lipase Units) 1 Capsule(s) Oral three times a day with meals  pantoprazole    Tablet 40 milliGRAM(s) Oral before breakfast  prednisoLONE acetate 1% Suspension 1 Drop(s) Right EYE three times a day  sertraline 25 milliGRAM(s) Oral daily    MEDICATIONS  (PRN):  acetaminophen     Tablet .. 650 milliGRAM(s) Oral every 6 hours PRN Mild Pain (1 - 3), Moderate Pain (4 - 6), Severe Pain (7 - 10)  albuterol    90 MICROgram(s) HFA Inhaler 2 Puff(s) Inhalation every 6 hours PRN Asthma  dextrose Oral Gel 15 Gram(s) Oral once PRN Blood Glucose LESS THAN 70 milliGRAM(s)/deciliter  EPINEPHrine     1 mG/mL Injectable 0.3 milliGRAM(s) IntraMuscular once PRN ANAPHYLAXIS  ondansetron    Tablet 4 milliGRAM(s) Oral every 6 hours PRN Nausea/vomiting      Vital Signs Last 24 Hrs  T(C): 36.9 (2025 11:20), Max: 37.1 (2025 09:55)  T(F): 98.5 (2025 11:20), Max: 98.8 (2025 09:55)  HR: 90 (2025 09:55) (79 - 93)  BP: 161/83 (2025 09:55) (119/74 - 161/83)  BP(mean): --  RR: 16 (2025 11:20) (16 - 18)  SpO2: 99% (2025 09:55) (99% - 99%)      CAPILLARY BLOOD GLUCOSE      POCT Blood Glucose.: 102 mg/dL (2025 12:45)  POCT Blood Glucose.: 106 mg/dL (2025 07:53)        PHYSICAL EXAM:  GENERAL: NAD  HEAD:  Atraumatic, Normocephalic  EYES: EOMI, conjunctiva and sclera clear  NECK: Supple, No JVD  CHEST/LUNG: Clear to auscultation bilaterally; No wheeze  HEART: Regular rate and rhythm; No murmurs, rubs, or gallops  ABDOMEN: Soft, Nontender, Nondistended; Bowel sounds present  EXTREMITIES:  2+ Peripheral Pulses, No clubbing, cyanosis, or edema  NEUROLOGY: non-focal  SKIN: No rashes or lesions    LABS:                        13.0   4.98  )-----------( 260      ( 2025 19:34 )             38.4     06-03    140  |  103  |  11  ----------------------------<  116[H]  3.7   |  28  |  0.66    Ca    9.8      2025 19:34    TPro  7.3  /  Alb  3.8  /  TBili  0.2  /  DBili  x   /  AST  14  /  ALT  31  /  AlkPhos  125[H]  06-03          Urinalysis Basic - ( 2025 19:45 )    Color: Yellow / Appearance: Clear / S.009 / pH: x  Gluc: x / Ketone: x  / Bili: Negative / Urobili: 0.2 mg/dL   Blood: x / Protein: Negative mg/dL / Nitrite: Negative   Leuk Esterase: Moderate / RBC: 1 /HPF / WBC 10 /HPF   Sq Epi: x / Non Sq Epi: x / Bacteria: Too Numerous to count /HPF        EKG(personally reviewed):    RADIOLOGY & ADDITIONAL TESTS:    Imaging Personally Reviewed:    Consultant(s) Notes Reviewed:      Care Discussed with Consultants/Other Providers:   HPI: Pt is 60F admitted to [facility] for depression.  She reports feeling dizzy, weak and unwell.  She says she has not slept or eaten in several days and is exhausted from crying.  She felt nauseous and tried to throw up but only some saliva came out.  She was able to drink water.     PAST MEDICAL & SURGICAL HISTORY:  Dyslipidemia      Hypothyroid      Asthma with allergic rhinitis      Gastroparesis      Fibromyalgia      Foot fracture, right  s/p fall on 9/3/2014, no surgery recommended,, foot brace maintained f/u with Dr. Ld Jay (orthopedic)      Stress incontinence in female      Fibroids  uterine      Thyroid nodule  s/p biopsy (benign)      Ovarian cyst  left      Depression  controlled with meds      Left knee pain  pt reports bilateral knee pain      Carpal tunnel syndrome, right upper limb      Trigger finger, right middle finger      Asthma, stable      Type 2 diabetes mellitus      Carpal tunnel syndrome, left upper limb      Trigger finger, left middle finger      Arthritis      H/O diabetic neuropathy      GERD (gastroesophageal reflux disease)      Anxiety      Anemia      COVID-19  HX 3/2020,- NOT  hospitalized,      H/O colposcopy with cervical biopsy      H/O:         H/O bilateral inguinal hernia repair      H/O tubal ligation        H/O colonoscopy with polypectomy  benign      H/O cervical polypectomy      Other functional disorder of bladder  bladder suspension for incontience      Left wrist fracture  10 years ago with repair and placement of pins      Female stress incontinence  sling procedure ; revision x 2 last       H/O abdominal hysterectomy  2014      H/O arthroscopic knee surgery  left      H/O carpal tunnel repair  RIGHT Hand 2017      S/P trigger finger release  RIGHT Middle Finger 2017      S/P carpal tunnel release  left; and left middle trigger finger release; 17      H/O arthroscopy  Right Shoulder       (normal spontaneous vaginal delivery)        History of D&C      H/O thyroidectomy          Review of Systems:   CONSTITUTIONAL: No fever, weight loss, + fatigue  EYES: No eye pain, visual disturbances, or discharge  ENMT:  No difficulty hearing, tinnitus, vertigo; No sinus or throat pain  NECK: No pain or stiffness  RESPIRATORY: No cough, wheezing, chills or hemoptysis; No shortness of breath  CARDIOVASCULAR: No chest pain, palpitations, dizziness, or leg swelling  GASTROINTESTINAL: see HPI  GENITOURINARY: + dysuria  NEUROLOGICAL: No headaches, memory loss, loss of strength, numbness, or tremors  SKIN: No itching, burning, rashes, or lesions   LYMPH NODES: No enlarged glands  ENDOCRINE: No heat or cold intolerance; No hair loss  MUSCULOSKELETAL: No joint pain or swelling; No muscle, back, or extremity pain  HEME/LYMPH: No easy bruising, or bleeding gums  ALLERY AND IMMUNOLOGIC: No hives or eczema    Allergies    Ultram (Unknown)  lactose (Flatulence)  apple (Unknown)  peanuts (Unknown)  Peaches (Unknown)  cherries (Swelling; Pruritus; Eye Irritation)  Soy (Unknown)  soy milk (Short breath; Anaphylaxis)  Corn (Unknown)  Milk (Unknown)  Originally Entered as [Unknown] reaction to [CHERRIES] (Unknown)    Intolerances        Social History: no etoh tob idu    FAMILY HISTORY:  Family history of diabetes mellitus (DM) (Father, Mother)    Family history of hypertension (Mother)    Family history of BPH (Father)        MEDICATIONS  (STANDING):  atorvastatin 10 milliGRAM(s) Oral at bedtime  dextrose 5%. 1000 milliLiter(s) (50 mL/Hr) IV Continuous <Continuous>  dextrose 50% Injectable 25 Gram(s) IV Push once  ferrous    sulfate 325 milliGRAM(s) Oral at bedtime  fluticasone propionate/ salmeterol 100-50 MICROgram(s) Diskus 1 Dose(s) Inhalation two times a day  gabapentin 300 milliGRAM(s) Oral at bedtime  glucagon  Injectable 1 milliGRAM(s) IntraMuscular once  insulin lispro (ADMELOG) corrective regimen sliding scale   SubCutaneous three times a day before meals  levothyroxine 88 MICROGram(s) Oral <User Schedule>  LORazepam     Tablet 0.5 milliGRAM(s) Oral three times a day  metFORMIN 500 milliGRAM(s) Oral daily  metoprolol succinate ER 25 milliGRAM(s) Oral daily  moxifloxacin 0.5% Solution 1 Drop(s) Right EYE three times a day  nortriptyline 25 milliGRAM(s) Oral at bedtime  pancrelipase  (CREON 36,000 Lipase Units) 1 Capsule(s) Oral three times a day with meals  pantoprazole    Tablet 40 milliGRAM(s) Oral before breakfast  prednisoLONE acetate 1% Suspension 1 Drop(s) Right EYE three times a day  sertraline 25 milliGRAM(s) Oral daily    MEDICATIONS  (PRN):  acetaminophen     Tablet .. 650 milliGRAM(s) Oral every 6 hours PRN Mild Pain (1 - 3), Moderate Pain (4 - 6), Severe Pain (7 - 10)  albuterol    90 MICROgram(s) HFA Inhaler 2 Puff(s) Inhalation every 6 hours PRN Asthma  dextrose Oral Gel 15 Gram(s) Oral once PRN Blood Glucose LESS THAN 70 milliGRAM(s)/deciliter  EPINEPHrine     1 mG/mL Injectable 0.3 milliGRAM(s) IntraMuscular once PRN ANAPHYLAXIS  ondansetron    Tablet 4 milliGRAM(s) Oral every 6 hours PRN Nausea/vomiting      Vital Signs Last 24 Hrs  T(C): 36.9 (2025 11:20), Max: 37.1 (2025 09:55)  T(F): 98.5 (2025 11:20), Max: 98.8 (2025 09:55)  HR: 90 (2025 09:55) (79 - 93)  BP: 161/83 (2025 09:55) (119/74 - 161/83)  BP(mean): --  RR: 16 (2025 11:20) (16 - 18)  SpO2: 99% (2025 09:55) (99% - 99%)      CAPILLARY BLOOD GLUCOSE      POCT Blood Glucose.: 102 mg/dL (2025 12:45)  POCT Blood Glucose.: 106 mg/dL (2025 07:53)        PHYSICAL EXAM:  GENERAL: NAD  HEAD:  Atraumatic, Normocephalic  EYES: EOMI, conjunctiva and sclera clear  NECK: Supple, No JVD  CHEST/LUNG: Clear to auscultation bilaterally; No wheeze  HEART: Regular rate and rhythm; No murmurs, rubs, or gallops  ABDOMEN: Soft, Nontender, Nondistended; Bowel sounds present  EXTREMITIES:  2+ Peripheral Pulses, No clubbing, cyanosis, or edema  NEUROLOGY: non-focal  SKIN: No rashes or lesions    LABS:                        13.0   4.98  )-----------( 260      ( 2025 19:34 )             38.4     06-03    140  |  103  |  11  ----------------------------<  116[H]  3.7   |  28  |  0.66    Ca    9.8      2025 19:34    TPro  7.3  /  Alb  3.8  /  TBili  0.2  /  DBili  x   /  AST  14  /  ALT  31  /  AlkPhos  125[H]  06-03          Urinalysis Basic - ( 2025 19:45 )    Color: Yellow / Appearance: Clear / S.009 / pH: x  Gluc: x / Ketone: x  / Bili: Negative / Urobili: 0.2 mg/dL   Blood: x / Protein: Negative mg/dL / Nitrite: Negative   Leuk Esterase: Moderate / RBC: 1 /HPF / WBC 10 /HPF   Sq Epi: x / Non Sq Epi: x / Bacteria: Too Numerous to count /HPF        EKG(personally reviewed): NSR no acute changes QTc 455    Care Discussed with Consultants/Other Providers: NP

## 2025-06-05 LAB
A1C WITH ESTIMATED AVERAGE GLUCOSE RESULT: 5.7 % — HIGH (ref 4–5.6)
ALBUMIN SERPL ELPH-MCNC: 4.1 G/DL — SIGNIFICANT CHANGE UP (ref 3.3–5)
ALP SERPL-CCNC: 94 U/L — SIGNIFICANT CHANGE UP (ref 40–120)
ALT FLD-CCNC: 17 U/L — SIGNIFICANT CHANGE UP (ref 4–33)
ANION GAP SERPL CALC-SCNC: 11 MMOL/L — SIGNIFICANT CHANGE UP (ref 7–14)
APPEARANCE UR: CLEAR — SIGNIFICANT CHANGE UP
AST SERPL-CCNC: 15 U/L — SIGNIFICANT CHANGE UP (ref 4–32)
BACTERIA # UR AUTO: ABNORMAL /HPF
BASOPHILS # BLD AUTO: 0.02 K/UL — SIGNIFICANT CHANGE UP (ref 0–0.2)
BASOPHILS NFR BLD AUTO: 0.4 % — SIGNIFICANT CHANGE UP (ref 0–2)
BILIRUB SERPL-MCNC: 0.4 MG/DL — SIGNIFICANT CHANGE UP (ref 0.2–1.2)
BILIRUB UR-MCNC: NEGATIVE — SIGNIFICANT CHANGE UP
BUN SERPL-MCNC: 12 MG/DL — SIGNIFICANT CHANGE UP (ref 7–23)
CALCIUM SERPL-MCNC: 9.2 MG/DL — SIGNIFICANT CHANGE UP (ref 8.4–10.5)
CAST: 0 /LPF — SIGNIFICANT CHANGE UP (ref 0–4)
CHLORIDE SERPL-SCNC: 102 MMOL/L — SIGNIFICANT CHANGE UP (ref 98–107)
CHOLEST SERPL-MCNC: 183 MG/DL — SIGNIFICANT CHANGE UP
CO2 SERPL-SCNC: 25 MMOL/L — SIGNIFICANT CHANGE UP (ref 22–31)
COLOR SPEC: YELLOW — SIGNIFICANT CHANGE UP
CREAT SERPL-MCNC: 0.68 MG/DL — SIGNIFICANT CHANGE UP (ref 0.5–1.3)
DIFF PNL FLD: NEGATIVE — SIGNIFICANT CHANGE UP
EGFR: 99 ML/MIN/1.73M2 — SIGNIFICANT CHANGE UP
EGFR: 99 ML/MIN/1.73M2 — SIGNIFICANT CHANGE UP
EOSINOPHIL # BLD AUTO: 0.06 K/UL — SIGNIFICANT CHANGE UP (ref 0–0.5)
EOSINOPHIL NFR BLD AUTO: 1.3 % — SIGNIFICANT CHANGE UP (ref 0–6)
ESTIMATED AVERAGE GLUCOSE: 117 — SIGNIFICANT CHANGE UP
GLUCOSE BLDC GLUCOMTR-MCNC: 109 MG/DL — HIGH (ref 70–99)
GLUCOSE BLDC GLUCOMTR-MCNC: 131 MG/DL — HIGH (ref 70–99)
GLUCOSE BLDC GLUCOMTR-MCNC: 146 MG/DL — HIGH (ref 70–99)
GLUCOSE BLDC GLUCOMTR-MCNC: 99 MG/DL — SIGNIFICANT CHANGE UP (ref 70–99)
GLUCOSE SERPL-MCNC: 107 MG/DL — HIGH (ref 70–99)
GLUCOSE UR QL: NEGATIVE MG/DL — SIGNIFICANT CHANGE UP
HCG SERPL-ACNC: <1 MIU/ML — SIGNIFICANT CHANGE UP
HCT VFR BLD CALC: 38.3 % — SIGNIFICANT CHANGE UP (ref 34.5–45)
HDLC SERPL-MCNC: 66 MG/DL — SIGNIFICANT CHANGE UP
HGB BLD-MCNC: 12.5 G/DL — SIGNIFICANT CHANGE UP (ref 11.5–15.5)
IANC: 3.17 K/UL — SIGNIFICANT CHANGE UP (ref 1.8–7.4)
IMM GRANULOCYTES NFR BLD AUTO: 0.4 % — SIGNIFICANT CHANGE UP (ref 0–0.9)
KETONES UR QL: NEGATIVE MG/DL — SIGNIFICANT CHANGE UP
LDLC SERPL-MCNC: 97 MG/DL — SIGNIFICANT CHANGE UP
LEUKOCYTE ESTERASE UR-ACNC: ABNORMAL
LIPID PNL WITH DIRECT LDL SERPL: 97 MG/DL — SIGNIFICANT CHANGE UP
LYMPHOCYTES # BLD AUTO: 1.05 K/UL — SIGNIFICANT CHANGE UP (ref 1–3.3)
LYMPHOCYTES # BLD AUTO: 22.3 % — SIGNIFICANT CHANGE UP (ref 13–44)
MAGNESIUM SERPL-MCNC: 2 MG/DL — SIGNIFICANT CHANGE UP (ref 1.6–2.6)
MCHC RBC-ENTMCNC: 32 PG — SIGNIFICANT CHANGE UP (ref 27–34)
MCHC RBC-ENTMCNC: 32.6 G/DL — SIGNIFICANT CHANGE UP (ref 32–36)
MCV RBC AUTO: 98 FL — SIGNIFICANT CHANGE UP (ref 80–100)
MONOCYTES # BLD AUTO: 0.39 K/UL — SIGNIFICANT CHANGE UP (ref 0–0.9)
MONOCYTES NFR BLD AUTO: 8.3 % — SIGNIFICANT CHANGE UP (ref 2–14)
NEUTROPHILS # BLD AUTO: 3.17 K/UL — SIGNIFICANT CHANGE UP (ref 1.8–7.4)
NEUTROPHILS NFR BLD AUTO: 67.3 % — SIGNIFICANT CHANGE UP (ref 43–77)
NITRITE UR-MCNC: POSITIVE
NONHDLC SERPL-MCNC: 117 MG/DL — SIGNIFICANT CHANGE UP
NRBC # BLD AUTO: 0 K/UL — SIGNIFICANT CHANGE UP (ref 0–0)
NRBC # FLD: 0 K/UL — SIGNIFICANT CHANGE UP (ref 0–0)
NRBC BLD AUTO-RTO: 0 /100 WBCS — SIGNIFICANT CHANGE UP (ref 0–0)
PH UR: 6.5 — SIGNIFICANT CHANGE UP (ref 5–8)
PLATELET # BLD AUTO: 234 K/UL — SIGNIFICANT CHANGE UP (ref 150–400)
POTASSIUM SERPL-MCNC: 3.7 MMOL/L — SIGNIFICANT CHANGE UP (ref 3.5–5.3)
POTASSIUM SERPL-SCNC: 3.7 MMOL/L — SIGNIFICANT CHANGE UP (ref 3.5–5.3)
PROT SERPL-MCNC: 6.8 G/DL — SIGNIFICANT CHANGE UP (ref 6–8.3)
PROT UR-MCNC: NEGATIVE MG/DL — SIGNIFICANT CHANGE UP
RBC # BLD: 3.91 M/UL — SIGNIFICANT CHANGE UP (ref 3.8–5.2)
RBC # FLD: 13.4 % — SIGNIFICANT CHANGE UP (ref 10.3–14.5)
RBC CASTS # UR COMP ASSIST: 0 /HPF — SIGNIFICANT CHANGE UP (ref 0–4)
SARS-COV-2 IGG+IGM SERPL QL IA: >250 U/ML — HIGH
SARS-COV-2 IGG+IGM SERPL QL IA: POSITIVE
SODIUM SERPL-SCNC: 138 MMOL/L — SIGNIFICANT CHANGE UP (ref 135–145)
SP GR SPEC: 1.01 — SIGNIFICANT CHANGE UP (ref 1–1.03)
SQUAMOUS # UR AUTO: 0 /HPF — SIGNIFICANT CHANGE UP (ref 0–5)
TRIGL SERPL-MCNC: 112 MG/DL — SIGNIFICANT CHANGE UP
TSH SERPL-MCNC: 1.27 UIU/ML — SIGNIFICANT CHANGE UP (ref 0.27–4.2)
UROBILINOGEN FLD QL: 0.2 MG/DL — SIGNIFICANT CHANGE UP (ref 0.2–1)
WBC # BLD: 4.71 K/UL — SIGNIFICANT CHANGE UP (ref 3.8–10.5)
WBC # FLD AUTO: 4.71 K/UL — SIGNIFICANT CHANGE UP (ref 3.8–10.5)
WBC UR QL: 7 /HPF — HIGH (ref 0–5)

## 2025-06-05 RX ORDER — NITROFURANTOIN MACROCRYSTAL 100 MG
100 CAPSULE ORAL
Refills: 0 | Status: COMPLETED | OUTPATIENT
Start: 2025-06-05 | End: 2025-06-10

## 2025-06-05 RX ORDER — LIPASE/PROTEASE/AMYLASE 10K-37.5K
2 CAPSULE,DELAYED RELEASE (ENTERIC COATED) ORAL
Refills: 0 | Status: DISCONTINUED | OUTPATIENT
Start: 2025-06-05 | End: 2025-06-24

## 2025-06-05 RX ADMIN — Medication 1 DROP(S): at 08:22

## 2025-06-05 RX ADMIN — MOXIFLOXACIN HYDROCHLORIDE 1 DROP(S): 5 SOLUTION/ DROPS OPHTHALMIC at 12:05

## 2025-06-05 RX ADMIN — Medication 2 CAPSULE(S): at 12:14

## 2025-06-05 RX ADMIN — Medication 325 MILLIGRAM(S): at 22:01

## 2025-06-05 RX ADMIN — Medication 1 DROP(S): at 22:53

## 2025-06-05 RX ADMIN — Medication 40 MILLIGRAM(S): at 08:20

## 2025-06-05 RX ADMIN — Medication 0.5 MILLIGRAM(S): at 22:07

## 2025-06-05 RX ADMIN — MOXIFLOXACIN HYDROCHLORIDE 1 DROP(S): 5 SOLUTION/ DROPS OPHTHALMIC at 08:22

## 2025-06-05 RX ADMIN — Medication 100 MILLIGRAM(S): at 22:01

## 2025-06-05 RX ADMIN — ATORVASTATIN CALCIUM 10 MILLIGRAM(S): 80 TABLET, FILM COATED ORAL at 22:01

## 2025-06-05 RX ADMIN — MOXIFLOXACIN HYDROCHLORIDE 1 DROP(S): 5 SOLUTION/ DROPS OPHTHALMIC at 22:52

## 2025-06-05 RX ADMIN — Medication 1 DROP(S): at 12:14

## 2025-06-05 RX ADMIN — Medication 88 MICROGRAM(S): at 06:29

## 2025-06-05 RX ADMIN — SERTRALINE 25 MILLIGRAM(S): 100 TABLET, FILM COATED ORAL at 08:20

## 2025-06-05 RX ADMIN — GABAPENTIN 300 MILLIGRAM(S): 400 CAPSULE ORAL at 22:00

## 2025-06-05 RX ADMIN — Medication 0.5 MILLIGRAM(S): at 12:14

## 2025-06-05 RX ADMIN — Medication 4 MILLIGRAM(S): at 23:07

## 2025-06-05 RX ADMIN — Medication 1 DOSE(S): at 08:22

## 2025-06-05 RX ADMIN — Medication 25 MILLIGRAM(S): at 22:01

## 2025-06-05 RX ADMIN — Medication 1 CAPSULE(S): at 08:19

## 2025-06-05 RX ADMIN — Medication 2 CAPSULE(S): at 16:56

## 2025-06-05 RX ADMIN — METFORMIN HYDROCHLORIDE 500 MILLIGRAM(S): 500 TABLET ORAL at 08:20

## 2025-06-05 NOTE — DIETITIAN INITIAL EVALUATION ADULT - PERTINENT LABORATORY DATA
06-05    138  |  102  |  12  ----------------------------<  107[H]  3.7   |  25  |  0.68    Ca    9.2      05 Jun 2025 08:00  Mg     2.00     06-05    TPro  6.8  /  Alb  4.1  /  TBili  0.4  /  DBili  x   /  AST  15  /  ALT  17  /  AlkPhos  94  06-05    A1C with Estimated Average Glucose Result: 5.7 % (06-05-25 @ 08:00)    CAPILLARY BLOOD GLUCOSE      POCT Blood Glucose.: 131 mg/dL (05 Jun 2025 11:54)  POCT Blood Glucose.: 99 mg/dL (05 Jun 2025 08:12)  POCT Blood Glucose.: 106 mg/dL (04 Jun 2025 22:49)  POCT Blood Glucose.: 128 mg/dL (04 Jun 2025 19:44)    Hemoglobin: 12.5 g/dL (06.05.25 @ 08:00)   Hematocrit: 38.3 % (06.05.25 @ 08:00)   Mean Cell Volume: 98.0 fL (06.05.25 @ 08:00)   Mean Cell Hemoglobin: 32.0 pg (06.05.25 @ 08:00)

## 2025-06-05 NOTE — BH INPATIENT PSYCHIATRY PROGRESS NOTE - CURRENT MEDICATION
MEDICATIONS  (STANDING):  atorvastatin 10 milliGRAM(s) Oral at bedtime  ferrous    sulfate 325 milliGRAM(s) Oral at bedtime  fluticasone propionate/ salmeterol 100-50 MICROgram(s) Diskus 1 Dose(s) Inhalation two times a day  gabapentin 300 milliGRAM(s) Oral at bedtime  glucagon  Injectable 1 milliGRAM(s) IntraMuscular once  insulin lispro (ADMELOG) corrective regimen sliding scale   SubCutaneous three times a day before meals  levothyroxine 88 MICROGram(s) Oral <User Schedule>  LORazepam     Tablet 0.5 milliGRAM(s) Oral three times a day  metFORMIN 500 milliGRAM(s) Oral daily  metoprolol succinate ER 25 milliGRAM(s) Oral daily  moxifloxacin 0.5% Solution 1 Drop(s) Right EYE three times a day  nitrofurantoin monohydrate/macrocrystals (MACROBID) 100 milliGRAM(s) Oral two times a day  nortriptyline 25 milliGRAM(s) Oral at bedtime  pancrelipase  (CREON 36,000 Lipase Units) 2 Capsule(s) Oral four times a day with meals  pantoprazole    Tablet 40 milliGRAM(s) Oral before breakfast  prednisoLONE acetate 1% Suspension 1 Drop(s) Right EYE three times a day  sertraline 25 milliGRAM(s) Oral daily    MEDICATIONS  (PRN):  acetaminophen     Tablet .. 650 milliGRAM(s) Oral every 6 hours PRN Mild Pain (1 - 3), Moderate Pain (4 - 6), Severe Pain (7 - 10)  albuterol    90 MICROgram(s) HFA Inhaler 2 Puff(s) Inhalation every 6 hours PRN Asthma  dextrose Oral Gel 15 Gram(s) Oral once PRN Blood Glucose LESS THAN 70 milliGRAM(s)/deciliter  EPINEPHrine     1 mG/mL Injectable 0.3 milliGRAM(s) IntraMuscular once PRN ANAPHYLAXIS  lidocaine   4% Patch 1 Patch Transdermal daily PRN Low back pain  ondansetron    Tablet 4 milliGRAM(s) Oral every 6 hours PRN Nausea/vomiting

## 2025-06-05 NOTE — BH INPATIENT PSYCHIATRY PROGRESS NOTE - NSBHFUPINTERVALHXFT_PSY_A_CORE
Chart reviewed and discussed with team. Pt seen and assessed with Dr. Toledo. On presentation, pt was pleasant and cooperative. Reported feeling similar to yesterday, feeling depressed. Says that she has had depressed mood for most of her life but that she doesn't usually talk about it. Says that today, she does not want to do anything except lie down and sleep. Reports little interest in doing any activities today. Denies SI/HI/AVH. Discussed continuing current plan with Sertraline for her depressive symptoms and Lorazapam for anxiety. Also discussed continuing antibiotics for abnormal urinalysis (pt endorses burning on urination). Pt expressed understanding and agrees with plan.  Additionally, pt describes feeling week and dizzy, similar to yesterday. Says she did not eat yesterday but reports having a good appetite and being able to eat breakfast today. Discussed Creon dose and encouraged patient to continue to eat and drink water, as this may help with some of these symptoms. Pt expressed understanding. Pt also asked about continuing Quinine for her leg spasms. Discussed that this medication is not currently indicated for her and we would like discontinue it at this time. Will continue to monitor for leg spasms while here. Pt expressed understanding and consented to discontinuing this medication. Pt also described history of chronic leg swelling and that outside doctor recommended daily compression stockings. Counseled that it is alright if she doesn't use the compression stockings while inpatient, and that she should do her best to keep her legs elevated when possible. No leg swelling was noted today.

## 2025-06-05 NOTE — DIETITIAN INITIAL EVALUATION ADULT - NS FNS DIET ORDER
Diet, Regular:   Consistent Carbohydrate {No Snacks} (CSTCHO)  DASH/TLC {Sodium & Cholesterol Restricted} (DASH)  [employer] Vegetarian (Accepts Fish) (06-04-25 @ 19:45)

## 2025-06-05 NOTE — BH INPATIENT PSYCHIATRY PROGRESS NOTE - NSBHASSESSSUMMFT_PSY_ALL_CORE
Pt is a 59 yo woman, , helps take care of her father, on disability, PPH of MDD and panic attacks, in outpt tx w a psychiatrist (doesn't remember his name, qasim VIEIRA has a script from [facility]), PMH of DM, HLD, asthma, hypothyroid, fibromyalgia, denies past violence/NSSI/SA/substance, BIBS for depression and SI.    On assessment today, patient was calm and cooperative with decreased energy and depressed affect. Given report of depressed mood for 2+ weeks, sleep disturbances, decreased energy, appetite changes and recent SI, presentation is most concerning for MDD. No active SI/HI/AVH. Additionally, UA with many bacteria and symptoms of burning on urination are most consistent with UTI. Will continue with current plan and continue to monitor.     Plan:   -Admit to [facility], 9.13  -No CO needed, routine observation, q15 checks   -Psychiatry:   gabapentin 300 milliGRAM(s) Oral at bedtime  LORazepam     Tablet 0.5 milliGRAM(s) Oral three times a day  sertraline 25 milliGRAM(s) Oral daily  -Medical:   nitrofuratntoin 100mg BID for 5 days (6/05 - 6/10) for UTI.   atorvastatin 10 milliGRAM(s) Oral at bedtime  ferrous    sulfate 325 milliGRAM(s) Oral at bedtime  fluticasone propionate/ salmeterol 100-50 MICROgram(s) Diskus 1 Dose(s) Inhalation two times a day  glucagon  Injectable 1 milliGRAM(s) IntraMuscular once  insulin lispro (ADMELOG) corrective regimen sliding scale   SubCutaneous three times a day before meals  levothyroxine 88 MICROGram(s) Oral <User Schedule>  metFORMIN 500 milliGRAM(s) Oral daily  metoprolol succinate ER 25 milliGRAM(s) Oral daily  moxifloxacin 0.5% Solution 1 Drop(s) Right EYE three times a day  nortriptyline 25 milliGRAM(s) Oral at bedtime  pancrelipase  (CREON 36,000 Lipase Units) 1 Capsule(s) Oral three times a day with meals  pantoprazole    Tablet 40 milliGRAM(s) Oral before breakfast  prednisoLONE acetate 1% Suspension 1 Drop(s) Right EYE three times a day  acetaminophen     Tablet .. 650 milliGRAM(s) Oral every 6 hours PRN Mild Pain (1 - 3), Moderate Pain (4 - 6), Severe Pain (7 - 10)  albuterol    90 MICROgram(s) HFA Inhaler 2 Puff(s) Inhalation every 6 hours PRN Asthma  dextrose Oral Gel 15 Gram(s) Oral once PRN Blood Glucose LESS THAN 70 milliGRAM(s)/deciliter  EPINEPHrine     1 mG/mL Injectable 0.3 milliGRAM(s) IntraMuscular once PRN ANAPHYLAXIS  ondansetron    Tablet 4 milliGRAM(s) Oral every 6 hours PRN Nausea/vomiting  -Group and milieu therapy  -Dispo as per SW

## 2025-06-05 NOTE — BH INPATIENT PSYCHIATRY PROGRESS NOTE - NSBHATTESTBILLING_PSY_A_CORE
99223-Initial OBS or IP - high complexity OR  mins 49800-Tkeatxtukf OBS or IP - moderate complexity OR 35-49 mins

## 2025-06-05 NOTE — BH INPATIENT PSYCHIATRY PROGRESS NOTE - NSBHMETABOLIC_PSY_ALL_CORE_FT
BMI: BMI (kg/m2): 21.6 (06-04-25 @ 11:20)  HbA1c: A1C with Estimated Average Glucose Result: 5.7 % (06-05-25 @ 08:00)    Glucose: POCT Blood Glucose.: 131 mg/dL (06-05-25 @ 11:54)    BP: 112/67 (06-05-25 @ 12:05) (112/67 - 112/67)Vital Signs Last 24 Hrs  T(C): --  T(F): --  HR: 89 (06-05-25 @ 12:05) (89 - 89)  BP: 112/67 (06-05-25 @ 12:05) (112/67 - 112/67)  BP(mean): --  RR: 18 (06-05-25 @ 12:05) (17 - 18)  SpO2: --    Orthostatic VS  06-05-25 @ 09:24  Lying BP: --/-- HR: --  Sitting BP: 107/54 HR: 79  Standing BP: 100/62 HR: 89  Site: --  Mode: --  Orthostatic VS  06-04-25 @ 11:20  Lying BP: --/-- HR: --  Sitting BP: 144/66 HR: 101  Standing BP: 136/68 HR: 105  Site: --  Mode: --    Lipid Panel: Date/Time: 06-05-25 @ 08:00  Cholesterol, Serum: 183  LDL Cholesterol Calculated: 97  HDL Cholesterol, Serum: 66  Total Cholesterol/HDL Ration Measurement: --  Triglycerides, Serum: 112   BMI: BMI (kg/m2): 21.6 (06-04-25 @ 11:20)  HbA1c: A1C with Estimated Average Glucose Result: 5.7 % (06-05-25 @ 08:00)    Glucose: POCT Blood Glucose.: 109 mg/dL (06-05-25 @ 21:04)    BP: 112/67 (06-05-25 @ 12:05) (112/67 - 112/67)Vital Signs Last 24 Hrs  T(C): --  T(F): --  HR: 89 (06-05-25 @ 12:05) (89 - 89)  BP: 112/67 (06-05-25 @ 12:05) (112/67 - 112/67)  BP(mean): --  RR: 18 (06-05-25 @ 12:05) (17 - 18)  SpO2: --    Orthostatic VS  06-05-25 @ 09:24  Lying BP: --/-- HR: --  Sitting BP: 107/54 HR: 79  Standing BP: 100/62 HR: 89  Site: --  Mode: --  Orthostatic VS  06-04-25 @ 11:20  Lying BP: --/-- HR: --  Sitting BP: 144/66 HR: 101  Standing BP: 136/68 HR: 105  Site: --  Mode: --    Lipid Panel: Date/Time: 06-05-25 @ 08:00  Cholesterol, Serum: 183  LDL Cholesterol Calculated: 97  HDL Cholesterol, Serum: 66  Total Cholesterol/HDL Ration Measurement: --  Triglycerides, Serum: 112

## 2025-06-05 NOTE — BH INPATIENT PSYCHIATRY PROGRESS NOTE - PRN MEDS
MEDICATIONS  (PRN):  acetaminophen     Tablet .. 650 milliGRAM(s) Oral every 6 hours PRN Mild Pain (1 - 3), Moderate Pain (4 - 6), Severe Pain (7 - 10)  albuterol    90 MICROgram(s) HFA Inhaler 2 Puff(s) Inhalation every 6 hours PRN Asthma  dextrose Oral Gel 15 Gram(s) Oral once PRN Blood Glucose LESS THAN 70 milliGRAM(s)/deciliter  EPINEPHrine     1 mG/mL Injectable 0.3 milliGRAM(s) IntraMuscular once PRN ANAPHYLAXIS  lidocaine   4% Patch 1 Patch Transdermal daily PRN Low back pain  ondansetron    Tablet 4 milliGRAM(s) Oral every 6 hours PRN Nausea/vomiting

## 2025-06-05 NOTE — DIETITIAN INITIAL EVALUATION ADULT - FACTORS AFF FOOD INTAKE
multiple food allergies/persistent lack of appetite/Scientologist/ethnic/cultural/personal food preferences

## 2025-06-05 NOTE — BH INPATIENT PSYCHIATRY PROGRESS NOTE - NSBHCHARTREVIEWVS_PSY_A_CORE FT
Vital Signs Last 24 Hrs  T(C): --  T(F): --  HR: 89 (06-05-25 @ 12:05) (89 - 89)  BP: 112/67 (06-05-25 @ 12:05) (112/67 - 112/67)  BP(mean): --  RR: 18 (06-05-25 @ 12:05) (17 - 18)  SpO2: --    Orthostatic VS  06-05-25 @ 09:24  Lying BP: --/-- HR: --  Sitting BP: 107/54 HR: 79  Standing BP: 100/62 HR: 89  Site: --  Mode: --  Orthostatic VS  06-04-25 @ 11:20  Lying BP: --/-- HR: --  Sitting BP: 144/66 HR: 101  Standing BP: 136/68 HR: 105  Site: --  Mode: --

## 2025-06-05 NOTE — BH INPATIENT PSYCHIATRY PROGRESS NOTE - NSBHATTESTCOMMENTATTENDFT_PSY_A_CORE
Care was discussed and reviewed in the interdisciplinary treatment team.  I, Matt Toledo MD, have reviewed and verified the documentation.

## 2025-06-05 NOTE — DIETITIAN INITIAL EVALUATION ADULT - ADD RECOMMEND
- c/w ferrous sulfate as per MD order.  - May consider daily MVI for micronutrient coverage if no medical contraindications per MD's discretion.   - Encourage po intake as tolerated and honor food preferences PRN.  - Monitor PO intake/tolerance, weights, labs, GI and skin integrity.

## 2025-06-05 NOTE — DIETITIAN INITIAL EVALUATION ADULT - OTHER INFO
Patient is a 59 y/o female with PPH of MDD and panic attacks, in outpt tx w a psychiatrist (doesn't remember his name, per PSYCKES has a script from Binh Valles), PMH of DM, HLD, asthma, hypothyroid, fibromyalgia, denies past violence/NSSI/SA/substance, BIBS for depression and SI.    Met with patient in her room today. Patient reports her appetite has been poor with poor PO intake over the past ~2 weeks PTA iso worsening depression. Current appetite remains poor but she tries to eat some of her breakfast this AM with PO ~60% as per plate waste observation. Allergic to apple, cherries, peaches, kiwi, pineapple, corn, peanuts, soy, ?milk vs lactose intolerance. Follows [place]-vegetarian diet and DM diet recommendations at home. Menu options explored with patient today, food preferences taken and honored on CBoard. Writer encouraged po intake as tolerated, patient verbalized understanding and declined ONS offered at this time. Plans to consume her lunch and dinner today. Amenable to daily Multivitamin for micronutrient coverage, will defer rx to MD order. +hx of DMII, current HgbA1C 5.7%. Blood lipids WNL. Reinforced Heart healthy and DM diet with patient today who verbalized understanding. States she has been adherence to therapeutic diet at home and has been physically active (walking >30 mins/d, hiking). Otherwise, patient denies chewing/swallowing difficulties on current diet. Denies GI distress (nausea/vomiting/diarrhea/ constipation). Case d/w RN.    Denies unintended wt loss PTA. Reports intentional wt loss of a few lb > few months but unable to recall her UBW. Current adm wt 118lb. No overt signs of muscle mass/body fat depletion per visual observation.

## 2025-06-06 LAB
GLUCOSE BLDC GLUCOMTR-MCNC: 101 MG/DL — HIGH (ref 70–99)
GLUCOSE BLDC GLUCOMTR-MCNC: 110 MG/DL — HIGH (ref 70–99)
GLUCOSE BLDC GLUCOMTR-MCNC: 114 MG/DL — HIGH (ref 70–99)
GLUCOSE BLDC GLUCOMTR-MCNC: 122 MG/DL — HIGH (ref 70–99)

## 2025-06-06 RX ORDER — SERTRALINE 100 MG/1
50 TABLET, FILM COATED ORAL DAILY
Refills: 0 | Status: DISCONTINUED | OUTPATIENT
Start: 2025-06-07 | End: 2025-06-09

## 2025-06-06 RX ADMIN — MOXIFLOXACIN HYDROCHLORIDE 1 DROP(S): 5 SOLUTION/ DROPS OPHTHALMIC at 21:25

## 2025-06-06 RX ADMIN — Medication 0.5 MILLIGRAM(S): at 21:20

## 2025-06-06 RX ADMIN — Medication 1 DROP(S): at 21:24

## 2025-06-06 RX ADMIN — Medication 100 MILLIGRAM(S): at 21:23

## 2025-06-06 RX ADMIN — Medication 100 MILLIGRAM(S): at 08:18

## 2025-06-06 RX ADMIN — Medication 2 CAPSULE(S): at 08:17

## 2025-06-06 RX ADMIN — Medication 25 MILLIGRAM(S): at 21:20

## 2025-06-06 RX ADMIN — METFORMIN HYDROCHLORIDE 500 MILLIGRAM(S): 500 TABLET ORAL at 08:18

## 2025-06-06 RX ADMIN — Medication 1 DOSE(S): at 21:20

## 2025-06-06 RX ADMIN — SERTRALINE 25 MILLIGRAM(S): 100 TABLET, FILM COATED ORAL at 08:17

## 2025-06-06 RX ADMIN — Medication 40 MILLIGRAM(S): at 08:18

## 2025-06-06 RX ADMIN — MOXIFLOXACIN HYDROCHLORIDE 1 DROP(S): 5 SOLUTION/ DROPS OPHTHALMIC at 08:17

## 2025-06-06 RX ADMIN — Medication 88 MICROGRAM(S): at 06:32

## 2025-06-06 RX ADMIN — Medication 0.5 MILLIGRAM(S): at 12:35

## 2025-06-06 RX ADMIN — METOPROLOL SUCCINATE 25 MILLIGRAM(S): 50 TABLET, EXTENDED RELEASE ORAL at 08:18

## 2025-06-06 RX ADMIN — Medication 2 CAPSULE(S): at 12:29

## 2025-06-06 RX ADMIN — MOXIFLOXACIN HYDROCHLORIDE 1 DROP(S): 5 SOLUTION/ DROPS OPHTHALMIC at 12:29

## 2025-06-06 RX ADMIN — ATORVASTATIN CALCIUM 10 MILLIGRAM(S): 80 TABLET, FILM COATED ORAL at 21:22

## 2025-06-06 RX ADMIN — Medication 2 CAPSULE(S): at 16:48

## 2025-06-06 RX ADMIN — Medication 1 DROP(S): at 12:34

## 2025-06-06 RX ADMIN — Medication 325 MILLIGRAM(S): at 21:20

## 2025-06-06 RX ADMIN — Medication 1 DROP(S): at 08:19

## 2025-06-06 RX ADMIN — Medication 0.5 MILLIGRAM(S): at 08:18

## 2025-06-06 NOTE — BH INPATIENT PSYCHIATRY PROGRESS NOTE - NSBHCHARTREVIEWVS_PSY_A_CORE FT
Vital Signs Last 24 Hrs  T(C): 36.6 (06-06-25 @ 07:34), Max: 36.6 (06-06-25 @ 07:34)  T(F): 97.9 (06-06-25 @ 07:34), Max: 97.9 (06-06-25 @ 07:34)  HR: --  BP: --  BP(mean): --  RR: 19 (06-06-25 @ 07:34) (19 - 19)  SpO2: --    Orthostatic VS  06-06-25 @ 07:34  Lying BP: --/-- HR: --  Sitting BP: 116/68 HR: 86  Standing BP: 113/60 HR: 96  Site: --  Mode: --  Orthostatic VS  06-05-25 @ 09:24  Lying BP: --/-- HR: --  Sitting BP: 107/54 HR: 79  Standing BP: 100/62 HR: 89  Site: --  Mode: --   Vital Signs Last 24 Hrs  T(C): 36.6 (06-06-25 @ 07:34), Max: 36.6 (06-06-25 @ 07:34)  T(F): 97.9 (06-06-25 @ 07:34), Max: 97.9 (06-06-25 @ 07:34)  HR: 86 (06-06-25 @ 12:31) (86 - 86)  BP: 111/64 (06-06-25 @ 12:31) (111/64 - 111/64)  BP(mean): --  RR: 18 (06-06-25 @ 12:31) (18 - 19)  SpO2: --    Orthostatic VS  06-06-25 @ 07:34  Lying BP: --/-- HR: --  Sitting BP: 116/68 HR: 86  Standing BP: 113/60 HR: 96  Site: --  Mode: --  Orthostatic VS  06-05-25 @ 09:24  Lying BP: --/-- HR: --  Sitting BP: 107/54 HR: 79  Standing BP: 100/62 HR: 89  Site: --  Mode: --

## 2025-06-06 NOTE — BH CHART NOTE - NSEVENTNOTEFT_PSY_ALL_CORE
JANY called to sign non-formulary form for home Quinine for leg cramps. Per 6/4/25 hospitalist note quinine is not recommended given potential serious side effects, and recommendation is not to continue quinine unless patient reports severe leg cramps, only if she has none of her current symptoms, and then not for more than 200mg qhs x3d. As patient not currently reporting leg cramps did not sign non-formulary, defer further management to primary team.

## 2025-06-06 NOTE — BH INPATIENT PSYCHIATRY PROGRESS NOTE - NSBHASSESSSUMMFT_PSY_ALL_CORE
Pt is a 59 yo woman, , helps take care of her father, on disability, PPH of MDD and panic attacks, in outpt tx w a psychiatrist (doesn't remember his name, qasim VIEIRA has a script from Binh Valles), PMH of DM, HLD, asthma, hypothyroid, fibromyalgia, denies past violence/NSSI/SA/substance, BIBS for depression and SI.    On assessment today, patient was calm and cooperative with decreased energy and depressed affect. Given report of depressed mood for 2+ weeks, sleep disturbances, decreased energy, appetite changes and recent SI, presentation is most concerning for MDD. No active SI/HI/AVH. Additionally, UA with many bacteria and symptoms of burning on urination are most consistent with UTI. Will continue with current plan and continue to monitor.     Plan:   -Admit to [facility], 9.13  -No CO needed, routine observation, q15 checks   -Psychiatry:   gabapentin 300 milliGRAM(s) Oral at bedtime  LORazepam     Tablet 0.5 milliGRAM(s) Oral three times a day  sertraline 25 milliGRAM(s) Oral daily  -Medical:   nitrofuratntoin 100mg BID for 5 days (6/05 - 6/10) for UTI.   atorvastatin 10 milliGRAM(s) Oral at bedtime  ferrous    sulfate 325 milliGRAM(s) Oral at bedtime  fluticasone propionate/ salmeterol 100-50 MICROgram(s) Diskus 1 Dose(s) Inhalation two times a day  glucagon  Injectable 1 milliGRAM(s) IntraMuscular once  insulin lispro (ADMELOG) corrective regimen sliding scale   SubCutaneous three times a day before meals  levothyroxine 88 MICROGram(s) Oral <User Schedule>  metFORMIN 500 milliGRAM(s) Oral daily  metoprolol succinate ER 25 milliGRAM(s) Oral daily  moxifloxacin 0.5% Solution 1 Drop(s) Right EYE three times a day  nortriptyline 25 milliGRAM(s) Oral at bedtime  pancrelipase  (CREON 36,000 Lipase Units) 1 Capsule(s) Oral three times a day with meals  pantoprazole    Tablet 40 milliGRAM(s) Oral before breakfast  prednisoLONE acetate 1% Suspension 1 Drop(s) Right EYE three times a day  acetaminophen     Tablet .. 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 Binh Valles), PMH of DM, HLD, asthma, hypothyroid, fibromyalgia, denies past violence/NSSI/SA/substance, BIBS for depression and SI.    On assessment today, patient was calm and cooperative with decreased energy and depressed affect. Given report of depressed mood for 2+ weeks, sleep disturbances, decreased energy, appetite changes and recent SI, presentation is most concerning for MDD. No active SI/HI/AVH. Additionally, UA with many bacteria and symptoms of burning on urination are most consistent with UTI. Plan to increase sertraline to 50mg.    Plan:   -Admit to [facility], 9.13  -No CO needed, routine observation, q15 checks   -Psychiatry:   gabapentin 300 milliGRAM(s) Oral at bedtime  LORazepam     Tablet 0.5 milliGRAM(s) Oral three times a day  sertraline 50 milliGRAM(s) Oral daily  -Medical:   nitrofuratntoin 100mg BID for 5 days (6/05 - 6/10) for UTI.   atorvastatin 10 milliGRAM(s) Oral at bedtime  ferrous    sulfate 325 milliGRAM(s) Oral at bedtime  fluticasone propionate/ salmeterol 100-50 MICROgram(s) Diskus 1 Dose(s) Inhalation two times a day  glucagon  Injectable 1 milliGRAM(s) IntraMuscular once  insulin lispro (ADMELOG) corrective regimen sliding scale   SubCutaneous three times a day before meals  levothyroxine 88 MICROGram(s) Oral <User Schedule>  metFORMIN 500 milliGRAM(s) Oral daily  metoprolol succinate ER 25 milliGRAM(s) Oral daily  moxifloxacin 0.5% Solution 1 Drop(s) Right EYE three times a day  nortriptyline 25 milliGRAM(s) Oral at bedtime  pancrelipase  (CREON 36,000 Lipase Units) 1 Capsule(s) Oral three times a day with meals  pantoprazole    Tablet 40 milliGRAM(s) Oral before breakfast  prednisoLONE acetate 1% Suspension 1 Drop(s) Right EYE three times a day  acetaminophen     Tablet .. 650 milliGRAM(s) Oral every 6 hours PRN Mild Pain (1 - 3), Moderate Pain (4 - 6), Severe Pain (7 - 10)  albuterol    90 MICROgram(s) HFA Inhaler 2 Puff(s) Inhalation every 6 hours PRN Asthma  dextrose Oral Gel 15 Gram(s) Oral once PRN Blood Glucose LESS THAN 70 milliGRAM(s)/deciliter  EPINEPHrine     1 mG/mL Injectable 0.3 milliGRAM(s) IntraMuscular once PRN ANAPHYLAXIS  ondansetron    Tablet 4 milliGRAM(s) Oral every 6 hours PRN Nausea/vomiting  -Group and milieu therapy  -Dispo as per SW

## 2025-06-06 NOTE — BH INPATIENT PSYCHIATRY PROGRESS NOTE - NSBHATTESTCOMMENTATTENDFT_PSY_A_CORE
Care was discussed and reviewed in the interdisciplinary treatment team.  I, Matt Toledo MD, have reviewed and verified the documentation.  Care was discussed and reviewed in the interdisciplinary treatment team.  I, Matt Toledo MD, have reviewed and verified the documentation.  Increased Sertraline from 25mg to 50mg daily for treatment of depression as of tomorrow 6/7.

## 2025-06-06 NOTE — BH INPATIENT PSYCHIATRY PROGRESS NOTE - NSBHFUPINTERVALHXFT_PSY_A_CORE
Chart reviewed and discussed with team. Pt seen separately by Dr. Toledo and assessment was discussed. On presentation, pt was lying in bed. Pt was pleasant and cooperative with depressed affect. Pt described that she still feels dizzy when she sits up too quickly. BP was mildly decreased (107/54) yesterday afternoon but normalized when rechecked later in the evening and today (116/68). Discussed the importance of sitting up slowly to prevent falls. Pt expressed understanding.   Regarding her mood, pt continues to report feeling depressed. Says she has been crying less than previously but still been crying intermittently throughout the day. Reports wanting to say in bed all day and is not interested in going to any group activities or talking to anyone on the unit. Encouraged to walk around the unit, she says shes not "ready." Denies SI/HI/AVH. Pt has not been eating large amounts because she is not happy with the food choices. Ate breakfast and lunch. Did not have dinner because she did not like the options. Discussed the importance of eating full meals.  Pt gave verbal consent that she would like her daughter Elvi ([phone]) and friend Estella Cid ([phone]) to be allowed to visit and to communicate with team regarding her care.   Collateral per daughter (Elvi): Daughter is .  She first noticed depressive symptoms in her mom when she was 8-10 years old. Noticed mom isolating in her bedroom and sleeping all day. She noted there have been a few such episodes but none as severe as that episode. While her mom was in [facility], she was hopsitalized twice for "asthma attacks." While in the hospital, these episodes were found to be panic attacks, and she was given Xanax. She notes that her mom has continued to take the Xanax and finds it helpful.   In January, her mother found out that her father had been having an affair. She "kicked him out of the house." They reconciled on Mother's day and her mother has allowed him back into the house. Daughter also reports an "unhealthy friendship" with a woman in her mothers Rastafari community, where this woman is "obsessed" with her mom. She says this woman has been a negative influence on her mom's mental health and on her mom's relationship with her father.  She notes that her mom has been asking for a lot of reassurance about her anxieties, and when they don't "tell her what she wants to hear" she feels like shes not being emotionally supported.   On Monday, she noted that her mom was very distressed about something and asked her for help. As she was on her way to work, she could not help her and her mom began to become very upset and cry. She believes that after she left for work, her mom started to look at old text messages about her fathers affair that this may have triggered this episode. She notes that her mom did not tell anyone that she was going to the ED and that they found out when her father received a call from the hospital.   Pt currently lives with , daughter (Elvi), son and pts father. (per patient, second daughter does not live close). Daughter mentioned that after the affair, her mother found financial papers that indicated that her father had accumulated a large amount of debt. This debt is currently impacting their halfway plans and causing a lot of stress on her mother.   Of note, daughter mentioned that mom has recently become more Rastafari. 2-3 years ago, pt had a "spiritual awakening" where she was baptized and became a seventh-day Mu-ism. No other family members are Rastafari. Daughter notes that pt has always been Rastafari but this was a radical change. This change has caused tension in the family. Pt has not wanted family to have people over to the home, has had new dietary restrictions (vegan), no longer goes on trips with her father. Pt often leaves family dinners to talk to spiritual mentors from the Buddhist. She no longer celebrates holidays or birthdays with the family. Daughter notes that she has become increasingly uncompromising with Rastafari beliefs.   PPHx: Panic attacks (hospitalized 2x, treated with Xanax). Pt sees a psychiatrist (Dr. Aguillon) but daughter says her mom "does not believe in therapy"  Family PPHx: Pts mother has history of MDD. Extensive family history of panic attacks. Nephew  of suicide 20 years ago.

## 2025-06-06 NOTE — BH INPATIENT PSYCHIATRY PROGRESS NOTE - CURRENT MEDICATION
MEDICATIONS  (STANDING):  atorvastatin 10 milliGRAM(s) Oral at bedtime  ferrous    sulfate 325 milliGRAM(s) Oral at bedtime  fluticasone propionate/ salmeterol 100-50 MICROgram(s) Diskus 1 Dose(s) Inhalation two times a day  gabapentin 300 milliGRAM(s) Oral at bedtime  glucagon  Injectable 1 milliGRAM(s) IntraMuscular once  insulin lispro (ADMELOG) corrective regimen sliding scale   SubCutaneous three times a day before meals  levothyroxine 88 MICROGram(s) Oral <User Schedule>  LORazepam     Tablet 0.5 milliGRAM(s) Oral three times a day  metFORMIN 500 milliGRAM(s) Oral daily  metoprolol succinate ER 25 milliGRAM(s) Oral daily  moxifloxacin 0.5% Solution 1 Drop(s) Right EYE three times a day  nitrofurantoin monohydrate/macrocrystals (MACROBID) 100 milliGRAM(s) Oral two times a day  nortriptyline 25 milliGRAM(s) Oral at bedtime  pancrelipase  (CREON 36,000 Lipase Units) 2 Capsule(s) Oral four times a day with meals  pantoprazole    Tablet 40 milliGRAM(s) Oral before breakfast  prednisoLONE acetate 1% Suspension 1 Drop(s) Right EYE three times a day  sertraline 25 milliGRAM(s) Oral daily    MEDICATIONS  (PRN):  acetaminophen     Tablet .. 650 milliGRAM(s) Oral every 6 hours PRN Mild Pain (1 - 3), Moderate Pain (4 - 6), Severe Pain (7 - 10)  albuterol    90 MICROgram(s) HFA Inhaler 2 Puff(s) Inhalation every 6 hours PRN Asthma  dextrose Oral Gel 15 Gram(s) Oral once PRN Blood Glucose LESS THAN 70 milliGRAM(s)/deciliter  EPINEPHrine     1 mG/mL Injectable 0.3 milliGRAM(s) IntraMuscular once PRN ANAPHYLAXIS  lidocaine   4% Patch 1 Patch Transdermal daily PRN Low back pain  ondansetron    Tablet 4 milliGRAM(s) Oral every 6 hours PRN Nausea/vomiting   MEDICATIONS  (STANDING):  atorvastatin 10 milliGRAM(s) Oral at bedtime  ferrous    sulfate 325 milliGRAM(s) Oral at bedtime  fluticasone propionate/ salmeterol 100-50 MICROgram(s) Diskus 1 Dose(s) Inhalation two times a day  gabapentin 300 milliGRAM(s) Oral at bedtime  glucagon  Injectable 1 milliGRAM(s) IntraMuscular once  insulin lispro (ADMELOG) corrective regimen sliding scale   SubCutaneous three times a day before meals  levothyroxine 88 MICROGram(s) Oral <User Schedule>  LORazepam     Tablet 0.5 milliGRAM(s) Oral three times a day  metFORMIN 500 milliGRAM(s) Oral daily  metoprolol succinate ER 25 milliGRAM(s) Oral daily  moxifloxacin 0.5% Solution 1 Drop(s) Right EYE three times a day  nitrofurantoin monohydrate/macrocrystals (MACROBID) 100 milliGRAM(s) Oral two times a day  nortriptyline 25 milliGRAM(s) Oral at bedtime  pancrelipase  (CREON 36,000 Lipase Units) 2 Capsule(s) Oral four times a day with meals  pantoprazole    Tablet 40 milliGRAM(s) Oral before breakfast  prednisoLONE acetate 1% Suspension 1 Drop(s) Right EYE three times a day    MEDICATIONS  (PRN):  acetaminophen     Tablet .. 650 milliGRAM(s) Oral every 6 hours PRN Mild Pain (1 - 3), Moderate Pain (4 - 6), Severe Pain (7 - 10)  albuterol    90 MICROgram(s) HFA Inhaler 2 Puff(s) Inhalation every 6 hours PRN Asthma  dextrose Oral Gel 15 Gram(s) Oral once PRN Blood Glucose LESS THAN 70 milliGRAM(s)/deciliter  EPINEPHrine     1 mG/mL Injectable 0.3 milliGRAM(s) IntraMuscular once PRN ANAPHYLAXIS  lidocaine   4% Patch 1 Patch Transdermal daily PRN Low back pain  ondansetron    Tablet 4 milliGRAM(s) Oral every 6 hours PRN Nausea/vomiting

## 2025-06-06 NOTE — BH INPATIENT PSYCHIATRY PROGRESS NOTE - NSBHMETABOLIC_PSY_ALL_CORE_FT
BMI: BMI (kg/m2): 21.6 (06-04-25 @ 11:20)  HbA1c: A1C with Estimated Average Glucose Result: 5.7 % (06-05-25 @ 08:00)    Glucose: POCT Blood Glucose.: 114 mg/dL (06-06-25 @ 12:11)    BP: 112/67 (06-05-25 @ 12:05) (112/67 - 112/67)Vital Signs Last 24 Hrs  T(C): 36.6 (06-06-25 @ 07:34), Max: 36.6 (06-06-25 @ 07:34)  T(F): 97.9 (06-06-25 @ 07:34), Max: 97.9 (06-06-25 @ 07:34)  HR: --  BP: --  BP(mean): --  RR: 19 (06-06-25 @ 07:34) (19 - 19)  SpO2: --    Orthostatic VS  06-06-25 @ 07:34  Lying BP: --/-- HR: --  Sitting BP: 116/68 HR: 86  Standing BP: 113/60 HR: 96  Site: --  Mode: --  Orthostatic VS  06-05-25 @ 09:24  Lying BP: --/-- HR: --  Sitting BP: 107/54 HR: 79  Standing BP: 100/62 HR: 89  Site: --  Mode: --    Lipid Panel: Date/Time: 06-05-25 @ 08:00  Cholesterol, Serum: 183  LDL Cholesterol Calculated: 97  HDL Cholesterol, Serum: 66  Total Cholesterol/HDL Ration Measurement: --  Triglycerides, Serum: 112   BMI: BMI (kg/m2): 21.6 (06-04-25 @ 11:20)  HbA1c: A1C with Estimated Average Glucose Result: 5.7 % (06-05-25 @ 08:00)    Glucose: POCT Blood Glucose.: 114 mg/dL (06-06-25 @ 12:11)    BP: 111/64 (06-06-25 @ 12:31) (111/64 - 112/67)Vital Signs Last 24 Hrs  T(C): 36.6 (06-06-25 @ 07:34), Max: 36.6 (06-06-25 @ 07:34)  T(F): 97.9 (06-06-25 @ 07:34), Max: 97.9 (06-06-25 @ 07:34)  HR: 86 (06-06-25 @ 12:31) (86 - 86)  BP: 111/64 (06-06-25 @ 12:31) (111/64 - 111/64)  BP(mean): --  RR: 18 (06-06-25 @ 12:31) (18 - 19)  SpO2: --    Orthostatic VS  06-06-25 @ 07:34  Lying BP: --/-- HR: --  Sitting BP: 116/68 HR: 86  Standing BP: 113/60 HR: 96  Site: --  Mode: --  Orthostatic VS  06-05-25 @ 09:24  Lying BP: --/-- HR: --  Sitting BP: 107/54 HR: 79  Standing BP: 100/62 HR: 89  Site: --  Mode: --    Lipid Panel: Date/Time: 06-05-25 @ 08:00  Cholesterol, Serum: 183  LDL Cholesterol Calculated: 97  HDL Cholesterol, Serum: 66  Total Cholesterol/HDL Ration Measurement: --  Triglycerides, Serum: 112

## 2025-06-07 LAB
-  AMPICILLIN/SULBACTAM: SIGNIFICANT CHANGE UP
-  AMPICILLIN: SIGNIFICANT CHANGE UP
-  AZTREONAM: SIGNIFICANT CHANGE UP
-  CEFAZOLIN: SIGNIFICANT CHANGE UP
-  CEFEPIME: SIGNIFICANT CHANGE UP
-  CEFTRIAXONE: SIGNIFICANT CHANGE UP
-  CEFUROXIME: SIGNIFICANT CHANGE UP
-  CIPROFLOXACIN: SIGNIFICANT CHANGE UP
-  ERTAPENEM: SIGNIFICANT CHANGE UP
-  GENTAMICIN: SIGNIFICANT CHANGE UP
-  IMIPENEM: SIGNIFICANT CHANGE UP
-  LEVOFLOXACIN: SIGNIFICANT CHANGE UP
-  MEROPENEM: SIGNIFICANT CHANGE UP
-  NITROFURANTOIN: SIGNIFICANT CHANGE UP
-  PIPERACILLIN/TAZOBACTAM: SIGNIFICANT CHANGE UP
-  TIGECYCLINE: SIGNIFICANT CHANGE UP
-  TOBRAMYCIN: SIGNIFICANT CHANGE UP
-  TRIMETHOPRIM/SULFAMETHOXAZOLE: SIGNIFICANT CHANGE UP
CULTURE RESULTS: ABNORMAL
GLUCOSE BLDC GLUCOMTR-MCNC: 100 MG/DL — HIGH (ref 70–99)
GLUCOSE BLDC GLUCOMTR-MCNC: 103 MG/DL — HIGH (ref 70–99)
GLUCOSE BLDC GLUCOMTR-MCNC: 114 MG/DL — HIGH (ref 70–99)
GLUCOSE BLDC GLUCOMTR-MCNC: 119 MG/DL — HIGH (ref 70–99)
METHOD TYPE: SIGNIFICANT CHANGE UP
ORGANISM # SPEC MICROSCOPIC CNT: ABNORMAL
ORGANISM # SPEC MICROSCOPIC CNT: SIGNIFICANT CHANGE UP
SPECIMEN SOURCE: SIGNIFICANT CHANGE UP

## 2025-06-07 RX ORDER — DIPHENHYDRAMINE HCL 12.5MG/5ML
25 ELIXIR ORAL AT BEDTIME
Refills: 0 | Status: DISCONTINUED | OUTPATIENT
Start: 2025-06-07 | End: 2025-06-16

## 2025-06-07 RX ADMIN — Medication 1 DOSE(S): at 21:21

## 2025-06-07 RX ADMIN — MOXIFLOXACIN HYDROCHLORIDE 1 DROP(S): 5 SOLUTION/ DROPS OPHTHALMIC at 12:18

## 2025-06-07 RX ADMIN — Medication 1 DROP(S): at 12:18

## 2025-06-07 RX ADMIN — Medication 0.5 MILLIGRAM(S): at 12:18

## 2025-06-07 RX ADMIN — Medication 4 MILLIGRAM(S): at 16:30

## 2025-06-07 RX ADMIN — Medication 88 MICROGRAM(S): at 05:17

## 2025-06-07 RX ADMIN — Medication 25 MILLIGRAM(S): at 21:19

## 2025-06-07 RX ADMIN — Medication 1 DROP(S): at 09:43

## 2025-06-07 RX ADMIN — MOXIFLOXACIN HYDROCHLORIDE 1 DROP(S): 5 SOLUTION/ DROPS OPHTHALMIC at 09:43

## 2025-06-07 RX ADMIN — Medication 100 MILLIGRAM(S): at 08:03

## 2025-06-07 RX ADMIN — Medication 100 MILLIGRAM(S): at 21:21

## 2025-06-07 RX ADMIN — Medication 2 CAPSULE(S): at 17:10

## 2025-06-07 RX ADMIN — Medication 40 MILLIGRAM(S): at 08:03

## 2025-06-07 RX ADMIN — MOXIFLOXACIN HYDROCHLORIDE 1 DROP(S): 5 SOLUTION/ DROPS OPHTHALMIC at 22:05

## 2025-06-07 RX ADMIN — Medication 0.5 MILLIGRAM(S): at 08:03

## 2025-06-07 RX ADMIN — Medication 2 CAPSULE(S): at 21:19

## 2025-06-07 RX ADMIN — Medication 2 CAPSULE(S): at 12:18

## 2025-06-07 RX ADMIN — Medication 25 MILLIGRAM(S): at 22:04

## 2025-06-07 RX ADMIN — Medication 650 MILLIGRAM(S): at 09:41

## 2025-06-07 RX ADMIN — Medication 0.5 MILLIGRAM(S): at 21:18

## 2025-06-07 RX ADMIN — SERTRALINE 50 MILLIGRAM(S): 100 TABLET, FILM COATED ORAL at 08:03

## 2025-06-07 RX ADMIN — Medication 2 CAPSULE(S): at 08:04

## 2025-06-07 RX ADMIN — METOPROLOL SUCCINATE 25 MILLIGRAM(S): 50 TABLET, EXTENDED RELEASE ORAL at 08:03

## 2025-06-07 RX ADMIN — ATORVASTATIN CALCIUM 10 MILLIGRAM(S): 80 TABLET, FILM COATED ORAL at 21:31

## 2025-06-07 RX ADMIN — Medication 1 DROP(S): at 22:04

## 2025-06-07 RX ADMIN — GABAPENTIN 300 MILLIGRAM(S): 400 CAPSULE ORAL at 21:21

## 2025-06-07 RX ADMIN — METFORMIN HYDROCHLORIDE 500 MILLIGRAM(S): 500 TABLET ORAL at 08:03

## 2025-06-07 RX ADMIN — Medication 325 MILLIGRAM(S): at 21:20

## 2025-06-07 NOTE — BH INPATIENT PSYCHIATRY PROGRESS NOTE - PRN MEDS
MEDICATIONS  (PRN):  acetaminophen     Tablet .. 650 milliGRAM(s) Oral every 6 hours PRN Mild Pain (1 - 3), Moderate Pain (4 - 6), Severe Pain (7 - 10)  albuterol    90 MICROgram(s) HFA Inhaler 2 Puff(s) Inhalation every 6 hours PRN Asthma  dextrose Oral Gel 15 Gram(s) Oral once PRN Blood Glucose LESS THAN 70 milliGRAM(s)/deciliter  diphenhydrAMINE 25 milliGRAM(s) Oral at bedtime PRN insomnia  EPINEPHrine     1 mG/mL Injectable 0.3 milliGRAM(s) IntraMuscular once PRN ANAPHYLAXIS  lidocaine   4% Patch 1 Patch Transdermal daily PRN Low back pain  ondansetron    Tablet 4 milliGRAM(s) Oral every 6 hours PRN Nausea/vomiting

## 2025-06-07 NOTE — BH INPATIENT PSYCHIATRY PROGRESS NOTE - CURRENT MEDICATION
MEDICATIONS  (STANDING):  atorvastatin 10 milliGRAM(s) Oral at bedtime  ferrous    sulfate 325 milliGRAM(s) Oral at bedtime  fluticasone propionate/ salmeterol 100-50 MICROgram(s) Diskus 1 Dose(s) Inhalation two times a day  gabapentin 300 milliGRAM(s) Oral at bedtime  glucagon  Injectable 1 milliGRAM(s) IntraMuscular once  insulin lispro (ADMELOG) corrective regimen sliding scale   SubCutaneous three times a day before meals  levothyroxine 88 MICROGram(s) Oral <User Schedule>  LORazepam     Tablet 0.5 milliGRAM(s) Oral three times a day  metFORMIN 500 milliGRAM(s) Oral daily  metoprolol succinate ER 25 milliGRAM(s) Oral daily  moxifloxacin 0.5% Solution 1 Drop(s) Right EYE three times a day  nitrofurantoin monohydrate/macrocrystals (MACROBID) 100 milliGRAM(s) Oral two times a day  nortriptyline 25 milliGRAM(s) Oral at bedtime  pancrelipase  (CREON 36,000 Lipase Units) 2 Capsule(s) Oral four times a day with meals  pantoprazole    Tablet 40 milliGRAM(s) Oral before breakfast  prednisoLONE acetate 1% Suspension 1 Drop(s) Right EYE three times a day  sertraline 50 milliGRAM(s) Oral daily    MEDICATIONS  (PRN):  acetaminophen     Tablet .. 650 milliGRAM(s) Oral every 6 hours PRN Mild Pain (1 - 3), Moderate Pain (4 - 6), Severe Pain (7 - 10)  albuterol    90 MICROgram(s) HFA Inhaler 2 Puff(s) Inhalation every 6 hours PRN Asthma  dextrose Oral Gel 15 Gram(s) Oral once PRN Blood Glucose LESS THAN 70 milliGRAM(s)/deciliter  diphenhydrAMINE 25 milliGRAM(s) Oral at bedtime PRN insomnia  EPINEPHrine     1 mG/mL Injectable 0.3 milliGRAM(s) IntraMuscular once PRN ANAPHYLAXIS  lidocaine   4% Patch 1 Patch Transdermal daily PRN Low back pain  ondansetron    Tablet 4 milliGRAM(s) Oral every 6 hours PRN Nausea/vomiting

## 2025-06-07 NOTE — BH INPATIENT PSYCHIATRY PROGRESS NOTE - NSBHMETABOLIC_PSY_ALL_CORE_FT
BMI: BMI (kg/m2): 21.6 (06-04-25 @ 11:20)  HbA1c: A1C with Estimated Average Glucose Result: 5.7 % (06-05-25 @ 08:00)    Glucose: POCT Blood Glucose.: 100 mg/dL (06-07-25 @ 07:59)    BP: 111/64 (06-06-25 @ 12:31) (111/64 - 112/67)Vital Signs Last 24 Hrs  T(C): 36.2 (06-07-25 @ 07:49), Max: 36.2 (06-07-25 @ 07:49)  T(F): 97.2 (06-07-25 @ 07:49), Max: 97.2 (06-07-25 @ 07:49)  HR: 86 (06-06-25 @ 12:31) (86 - 86)  BP: 111/64 (06-06-25 @ 12:31) (111/64 - 111/64)  BP(mean): --  RR: 18 (06-07-25 @ 07:49) (18 - 18)  SpO2: --    Orthostatic VS  06-07-25 @ 07:49  Lying BP: --/-- HR: --  Sitting BP: 126/59 HR: 81  Standing BP: 114/58 HR: 91  Site: --  Mode: --  Orthostatic VS  06-06-25 @ 07:34  Lying BP: --/-- HR: --  Sitting BP: 116/68 HR: 86  Standing BP: 113/60 HR: 96  Site: --  Mode: --    Lipid Panel: Date/Time: 06-05-25 @ 08:00  Cholesterol, Serum: 183  LDL Cholesterol Calculated: 97  HDL Cholesterol, Serum: 66  Total Cholesterol/HDL Ration Measurement: --  Triglycerides, Serum: 112

## 2025-06-07 NOTE — BH INPATIENT PSYCHIATRY PROGRESS NOTE - NSBHASSESSSUMMFT_PSY_ALL_CORE
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 Binh Valles), PMH of DM, HLD, asthma, hypothyroid, fibromyalgia, denies past violence/NSSI/SA/substance, BIBS for depression and SI.    Depression.      Plan:   -[facility], 9.13  -No CO needed, routine observation, q15 checks   -Psychiatry:   gabapentin 300 milliGRAM(s) Oral at bedtime  LORazepam     Tablet 0.5 milliGRAM(s) Oral three times a day  sertraline 50 milliGRAM(s) Oral daily  -Medical:   Pt requested Tylenol PM, pt already has acetaminophen PRN order, added diphenhydramine 25 mg PO QHS PRN for insomnia    nitrofuratntoin 100mg BID for 5 days (6/05 - 6/10) for UTI.   atorvastatin 10 milliGRAM(s) Oral at bedtime  ferrous    sulfate 325 milliGRAM(s) Oral at bedtime  fluticasone propionate/ salmeterol 100-50 MICROgram(s) Diskus 1 Dose(s) Inhalation two times a day  glucagon  Injectable 1 milliGRAM(s) IntraMuscular once  insulin lispro (ADMELOG) corrective regimen sliding scale   SubCutaneous three times a day before meals  levothyroxine 88 MICROGram(s) Oral <User Schedule>  metFORMIN 500 milliGRAM(s) Oral daily  metoprolol succinate ER 25 milliGRAM(s) Oral daily  moxifloxacin 0.5% Solution 1 Drop(s) Right EYE three times a day  nortriptyline 25 milliGRAM(s) Oral at bedtime  pancrelipase  (CREON 36,000 Lipase Units) 1 Capsule(s) Oral three times a day with meals  pantoprazole    Tablet 40 milliGRAM(s) Oral before breakfast  prednisoLONE acetate 1% Suspension 1 Drop(s) Right EYE three times a day  acetaminophen     Tablet .. 650 milliGRAM(s) Oral every 6 hours PRN Mild Pain (1 - 3), Moderate Pain (4 - 6), Severe Pain (7 - 10)  albuterol    90 MICROgram(s) HFA Inhaler 2 Puff(s) Inhalation every 6 hours PRN Asthma  dextrose Oral Gel 15 Gram(s) Oral once PRN Blood Glucose LESS THAN 70 milliGRAM(s)/deciliter  EPINEPHrine     1 mG/mL Injectable 0.3 milliGRAM(s) IntraMuscular once PRN ANAPHYLAXIS  ondansetron    Tablet 4 milliGRAM(s) Oral every 6 hours PRN Nausea/vomiting  -Group and milieu therapy  -Dispo as per SW

## 2025-06-07 NOTE — BH INPATIENT PSYCHIATRY PROGRESS NOTE - NSBHFUPINTERVALHXFT_PSY_A_CORE
Pt states she is still depressed, crying.  Reports poor sleep, asks for Tylenol PM.  Denies suicidality.

## 2025-06-07 NOTE — BH INPATIENT PSYCHIATRY PROGRESS NOTE - NSBHCHARTREVIEWVS_PSY_A_CORE FT
Vital Signs Last 24 Hrs  T(C): 36.2 (06-07-25 @ 07:49), Max: 36.2 (06-07-25 @ 07:49)  T(F): 97.2 (06-07-25 @ 07:49), Max: 97.2 (06-07-25 @ 07:49)  HR: 86 (06-06-25 @ 12:31) (86 - 86)  BP: 111/64 (06-06-25 @ 12:31) (111/64 - 111/64)  BP(mean): --  RR: 18 (06-07-25 @ 07:49) (18 - 18)  SpO2: --    Orthostatic VS  06-07-25 @ 07:49  Lying BP: --/-- HR: --  Sitting BP: 126/59 HR: 81  Standing BP: 114/58 HR: 91  Site: --  Mode: --  Orthostatic VS  06-06-25 @ 07:34  Lying BP: --/-- HR: --  Sitting BP: 116/68 HR: 86  Standing BP: 113/60 HR: 96  Site: --  Mode: --

## 2025-06-08 LAB
GLUCOSE BLDC GLUCOMTR-MCNC: 100 MG/DL — HIGH (ref 70–99)
GLUCOSE BLDC GLUCOMTR-MCNC: 134 MG/DL — HIGH (ref 70–99)
GLUCOSE BLDC GLUCOMTR-MCNC: 85 MG/DL — SIGNIFICANT CHANGE UP (ref 70–99)

## 2025-06-08 RX ADMIN — Medication 100 MILLIGRAM(S): at 08:13

## 2025-06-08 RX ADMIN — Medication 1 DROP(S): at 12:30

## 2025-06-08 RX ADMIN — ATORVASTATIN CALCIUM 10 MILLIGRAM(S): 80 TABLET, FILM COATED ORAL at 21:29

## 2025-06-08 RX ADMIN — GABAPENTIN 300 MILLIGRAM(S): 400 CAPSULE ORAL at 21:29

## 2025-06-08 RX ADMIN — Medication 0.5 MILLIGRAM(S): at 08:13

## 2025-06-08 RX ADMIN — Medication 325 MILLIGRAM(S): at 21:29

## 2025-06-08 RX ADMIN — METOPROLOL SUCCINATE 25 MILLIGRAM(S): 50 TABLET, EXTENDED RELEASE ORAL at 08:13

## 2025-06-08 RX ADMIN — MOXIFLOXACIN HYDROCHLORIDE 1 DROP(S): 5 SOLUTION/ DROPS OPHTHALMIC at 12:30

## 2025-06-08 RX ADMIN — MOXIFLOXACIN HYDROCHLORIDE 1 DROP(S): 5 SOLUTION/ DROPS OPHTHALMIC at 21:34

## 2025-06-08 RX ADMIN — Medication 88 MICROGRAM(S): at 10:31

## 2025-06-08 RX ADMIN — METFORMIN HYDROCHLORIDE 500 MILLIGRAM(S): 500 TABLET ORAL at 08:25

## 2025-06-08 RX ADMIN — Medication 2 CAPSULE(S): at 12:31

## 2025-06-08 RX ADMIN — Medication 40 MILLIGRAM(S): at 08:14

## 2025-06-08 RX ADMIN — Medication 1 DROP(S): at 08:13

## 2025-06-08 RX ADMIN — Medication 0.5 MILLIGRAM(S): at 12:31

## 2025-06-08 RX ADMIN — Medication 1 DROP(S): at 22:35

## 2025-06-08 RX ADMIN — MOXIFLOXACIN HYDROCHLORIDE 1 DROP(S): 5 SOLUTION/ DROPS OPHTHALMIC at 08:13

## 2025-06-08 RX ADMIN — SERTRALINE 50 MILLIGRAM(S): 100 TABLET, FILM COATED ORAL at 08:13

## 2025-06-08 RX ADMIN — Medication 2 CAPSULE(S): at 08:14

## 2025-06-08 RX ADMIN — Medication 0.5 MILLIGRAM(S): at 21:29

## 2025-06-08 RX ADMIN — Medication 25 MILLIGRAM(S): at 21:29

## 2025-06-08 RX ADMIN — Medication 25 MILLIGRAM(S): at 23:10

## 2025-06-08 RX ADMIN — Medication 100 MILLIGRAM(S): at 21:29

## 2025-06-08 NOTE — BH INPATIENT PSYCHIATRY PROGRESS NOTE - NSBHMETABOLIC_PSY_ALL_CORE_FT
BMI: BMI (kg/m2): 21.6 (06-04-25 @ 11:20)  HbA1c: A1C with Estimated Average Glucose Result: 5.7 % (06-05-25 @ 08:00)    Glucose: POCT Blood Glucose.: 100 mg/dL (06-08-25 @ 08:03)    BP: 111/64 (06-06-25 @ 12:31) (111/64 - 112/67)Vital Signs Last 24 Hrs  T(C): 36.4 (06-08-25 @ 08:16), Max: 36.4 (06-08-25 @ 08:16)  T(F): 97.5 (06-08-25 @ 08:16), Max: 97.5 (06-08-25 @ 08:16)  HR: --  BP: --  BP(mean): --  RR: 17 (06-08-25 @ 08:16) (17 - 17)  SpO2: --    Orthostatic VS  06-08-25 @ 08:16  Lying BP: --/-- HR: --  Sitting BP: 122/62 HR: 77  Standing BP: 126/61 HR: 82  Site: --  Mode: --  Orthostatic VS  06-07-25 @ 07:49  Lying BP: --/-- HR: --  Sitting BP: 126/59 HR: 81  Standing BP: 114/58 HR: 91  Site: --  Mode: --    Lipid Panel: Date/Time: 06-05-25 @ 08:00  Cholesterol, Serum: 183  LDL Cholesterol Calculated: 97  HDL Cholesterol, Serum: 66  Total Cholesterol/HDL Ration Measurement: --  Triglycerides, Serum: 112

## 2025-06-08 NOTE — BH INPATIENT PSYCHIATRY PROGRESS NOTE - NSBHFUPINTERVALHXFT_PSY_A_CORE
Pt states she slept better overnight with diphenhydramine.  Pt continues to report chronic pain.  Pt states today she is feeling anxious, asks for alprazolam.  Discussed risks of addiction with alprazolam and that lorazepam is a better choice, pt states team has already informed her of that, but she feels alprazolam works better.

## 2025-06-08 NOTE — BH INPATIENT PSYCHIATRY PROGRESS NOTE - NSBHASSESSSUMMFT_PSY_ALL_CORE
Pt is a 61 yo woman, , helps take care of her father, on disability, PPH of MDD and panic attacks, in outpt tx w a psychiatrist (Dr. Binh Valles), PMH of DM, HLD, asthma, hypothyroid, fibromyalgia, denies past violence/NSSI/SA/substance, BIBS for depression and SI.    More anxiety today.  Continue plan below.    Plan:   -[facility], 9.13  -No CO needed, routine observation, q15 checks   -Psychiatry:   gabapentin 300 milliGRAM(s) Oral at bedtime  LORazepam     Tablet 0.5 milliGRAM(s) Oral three times a day  sertraline 50 milliGRAM(s) Oral daily  -Medical:   Pt requested Tylenol PM, pt already has acetaminophen PRN order, added diphenhydramine 25 mg PO QHS PRN for insomnia    nitrofuratntoin 100mg BID for 5 days (6/05 - 6/10) for UTI.   atorvastatin 10 milliGRAM(s) Oral at bedtime  ferrous    sulfate 325 milliGRAM(s) Oral at bedtime  fluticasone propionate/ salmeterol 100-50 MICROgram(s) Diskus 1 Dose(s) Inhalation two times a day  glucagon  Injectable 1 milliGRAM(s) IntraMuscular once  insulin lispro (ADMELOG) corrective regimen sliding scale   SubCutaneous three times a day before meals  levothyroxine 88 MICROGram(s) Oral <User Schedule>  metFORMIN 500 milliGRAM(s) Oral daily  metoprolol succinate ER 25 milliGRAM(s) Oral daily  moxifloxacin 0.5% Solution 1 Drop(s) Right EYE three times a day  nortriptyline 25 milliGRAM(s) Oral at bedtime  pancrelipase  (CREON 36,000 Lipase Units) 1 Capsule(s) Oral three times a day with meals  pantoprazole    Tablet 40 milliGRAM(s) Oral before breakfast  prednisoLONE acetate 1% Suspension 1 Drop(s) Right EYE three times a day  acetaminophen     Tablet .. 650 milliGRAM(s) Oral every 6 hours PRN Mild Pain (1 - 3), Moderate Pain (4 - 6), Severe Pain (7 - 10)  albuterol    90 MICROgram(s) HFA Inhaler 2 Puff(s) Inhalation every 6 hours PRN Asthma  dextrose Oral Gel 15 Gram(s) Oral once PRN Blood Glucose LESS THAN 70 milliGRAM(s)/deciliter  EPINEPHrine     1 mG/mL Injectable 0.3 milliGRAM(s) IntraMuscular once PRN ANAPHYLAXIS  ondansetron    Tablet 4 milliGRAM(s) Oral every 6 hours PRN Nausea/vomiting  -Group and milieu therapy  -Dispo as per SW

## 2025-06-08 NOTE — BH INPATIENT PSYCHIATRY PROGRESS NOTE - NSBHCHARTREVIEWVS_PSY_A_CORE FT
Vital Signs Last 24 Hrs  T(C): 36.4 (06-08-25 @ 08:16), Max: 36.4 (06-08-25 @ 08:16)  T(F): 97.5 (06-08-25 @ 08:16), Max: 97.5 (06-08-25 @ 08:16)  HR: --  BP: --  BP(mean): --  RR: 17 (06-08-25 @ 08:16) (17 - 17)  SpO2: --    Orthostatic VS  06-08-25 @ 08:16  Lying BP: --/-- HR: --  Sitting BP: 122/62 HR: 77  Standing BP: 126/61 HR: 82  Site: --  Mode: --  Orthostatic VS  06-07-25 @ 07:49  Lying BP: --/-- HR: --  Sitting BP: 126/59 HR: 81  Standing BP: 114/58 HR: 91  Site: --  Mode: --

## 2025-06-09 LAB
GLUCOSE BLDC GLUCOMTR-MCNC: 103 MG/DL — HIGH (ref 70–99)
GLUCOSE BLDC GLUCOMTR-MCNC: 105 MG/DL — HIGH (ref 70–99)
GLUCOSE BLDC GLUCOMTR-MCNC: 119 MG/DL — HIGH (ref 70–99)
GLUCOSE BLDC GLUCOMTR-MCNC: 127 MG/DL — HIGH (ref 70–99)

## 2025-06-09 PROCEDURE — 99232 SBSQ HOSP IP/OBS MODERATE 35: CPT | Mod: FS

## 2025-06-09 RX ORDER — LORAZEPAM 4 MG/ML
1 VIAL (ML) INJECTION ONCE
Refills: 0 | Status: DISCONTINUED | OUTPATIENT
Start: 2025-06-09 | End: 2025-06-16

## 2025-06-09 RX ORDER — LORAZEPAM 4 MG/ML
0.5 VIAL (ML) INJECTION THREE TIMES A DAY
Refills: 0 | Status: DISCONTINUED | OUTPATIENT
Start: 2025-06-09 | End: 2025-06-16

## 2025-06-09 RX ORDER — GABAPENTIN 400 MG/1
600 CAPSULE ORAL AT BEDTIME
Refills: 0 | Status: DISCONTINUED | OUTPATIENT
Start: 2025-06-09 | End: 2025-06-12

## 2025-06-09 RX ORDER — SERTRALINE 100 MG/1
75 TABLET, FILM COATED ORAL DAILY
Refills: 0 | Status: DISCONTINUED | OUTPATIENT
Start: 2025-06-09 | End: 2025-06-11

## 2025-06-09 RX ORDER — LORAZEPAM 4 MG/ML
1 VIAL (ML) INJECTION EVERY 6 HOURS
Refills: 0 | Status: DISCONTINUED | OUTPATIENT
Start: 2025-06-09 | End: 2025-06-16

## 2025-06-09 RX ADMIN — MOXIFLOXACIN HYDROCHLORIDE 1 DROP(S): 5 SOLUTION/ DROPS OPHTHALMIC at 08:59

## 2025-06-09 RX ADMIN — Medication 0.5 MILLIGRAM(S): at 08:59

## 2025-06-09 RX ADMIN — Medication 25 MILLIGRAM(S): at 20:39

## 2025-06-09 RX ADMIN — Medication 325 MILLIGRAM(S): at 20:39

## 2025-06-09 RX ADMIN — GABAPENTIN 600 MILLIGRAM(S): 400 CAPSULE ORAL at 20:40

## 2025-06-09 RX ADMIN — Medication 40 MILLIGRAM(S): at 09:00

## 2025-06-09 RX ADMIN — Medication 1 DOSE(S): at 20:46

## 2025-06-09 RX ADMIN — Medication 2 CAPSULE(S): at 12:23

## 2025-06-09 RX ADMIN — Medication 1 DOSE(S): at 08:59

## 2025-06-09 RX ADMIN — Medication 100 MILLIGRAM(S): at 09:00

## 2025-06-09 RX ADMIN — ATORVASTATIN CALCIUM 10 MILLIGRAM(S): 80 TABLET, FILM COATED ORAL at 20:39

## 2025-06-09 RX ADMIN — Medication 88 MICROGRAM(S): at 07:48

## 2025-06-09 RX ADMIN — Medication 0.5 MILLIGRAM(S): at 12:23

## 2025-06-09 RX ADMIN — Medication 100 MILLIGRAM(S): at 20:39

## 2025-06-09 RX ADMIN — MOXIFLOXACIN HYDROCHLORIDE 1 DROP(S): 5 SOLUTION/ DROPS OPHTHALMIC at 12:23

## 2025-06-09 RX ADMIN — Medication 0.5 MILLIGRAM(S): at 20:43

## 2025-06-09 RX ADMIN — METFORMIN HYDROCHLORIDE 500 MILLIGRAM(S): 500 TABLET ORAL at 09:00

## 2025-06-09 RX ADMIN — Medication 1 DROP(S): at 12:23

## 2025-06-09 RX ADMIN — METOPROLOL SUCCINATE 25 MILLIGRAM(S): 50 TABLET, EXTENDED RELEASE ORAL at 09:00

## 2025-06-09 RX ADMIN — SERTRALINE 50 MILLIGRAM(S): 100 TABLET, FILM COATED ORAL at 09:00

## 2025-06-09 RX ADMIN — Medication 2 CAPSULE(S): at 08:59

## 2025-06-09 RX ADMIN — Medication 1 DROP(S): at 08:59

## 2025-06-09 RX ADMIN — Medication 2 CAPSULE(S): at 16:57

## 2025-06-09 NOTE — BH INPATIENT PSYCHIATRY PROGRESS NOTE - PRN MEDS
MEDICATIONS  (PRN):  acetaminophen     Tablet .. 650 milliGRAM(s) Oral every 6 hours PRN Mild Pain (1 - 3), Moderate Pain (4 - 6), Severe Pain (7 - 10)  albuterol    90 MICROgram(s) HFA Inhaler 2 Puff(s) Inhalation every 6 hours PRN Asthma  dextrose Oral Gel 15 Gram(s) Oral once PRN Blood Glucose LESS THAN 70 milliGRAM(s)/deciliter  diphenhydrAMINE 25 milliGRAM(s) Oral at bedtime PRN insomnia  EPINEPHrine     1 mG/mL Injectable 0.3 milliGRAM(s) IntraMuscular once PRN ANAPHYLAXIS  lidocaine   4% Patch 1 Patch Transdermal daily PRN Low back pain  LORazepam     Tablet 1 milliGRAM(s) Oral every 6 hours PRN Agitation 2/2 mood  LORazepam   Injectable 1 milliGRAM(s) IntraMuscular once PRN Agitation 2/2 mood  ondansetron    Tablet 4 milliGRAM(s) Oral every 6 hours PRN Nausea/vomiting

## 2025-06-09 NOTE — BH INPATIENT PSYCHIATRY PROGRESS NOTE - NSBHCHARTREVIEWVS_PSY_A_CORE FT
Vital Signs Last 24 Hrs  T(C): 36.6 (06-09-25 @ 07:29), Max: 36.6 (06-09-25 @ 07:29)  T(F): 97.8 (06-09-25 @ 07:29), Max: 97.8 (06-09-25 @ 07:29)  HR: --  BP: --  BP(mean): --  RR: --  SpO2: --    Orthostatic VS  06-09-25 @ 07:29  Lying BP: --/-- HR: --  Sitting BP: 129/60 HR: 78  Standing BP: 115/67 HR: 84  Site: --  Mode: --  Orthostatic VS  06-08-25 @ 08:16  Lying BP: --/-- HR: --  Sitting BP: 122/62 HR: 77  Standing BP: 126/61 HR: 82  Site: --  Mode: --

## 2025-06-09 NOTE — BH INPATIENT PSYCHIATRY PROGRESS NOTE - PRN MEDS
English MEDICATIONS  (PRN):  acetaminophen     Tablet .. 650 milliGRAM(s) Oral every 6 hours PRN Mild Pain (1 - 3), Moderate Pain (4 - 6), Severe Pain (7 - 10)  albuterol    90 MICROgram(s) HFA Inhaler 2 Puff(s) Inhalation every 6 hours PRN Asthma  dextrose Oral Gel 15 Gram(s) Oral once PRN Blood Glucose LESS THAN 70 milliGRAM(s)/deciliter  diphenhydrAMINE 25 milliGRAM(s) Oral at bedtime PRN insomnia  EPINEPHrine     1 mG/mL Injectable 0.3 milliGRAM(s) IntraMuscular once PRN ANAPHYLAXIS  lidocaine   4% Patch 1 Patch Transdermal daily PRN Low back pain  LORazepam     Tablet 1 milliGRAM(s) Oral every 6 hours PRN Agitation 2/2 mood  LORazepam   Injectable 1 milliGRAM(s) IntraMuscular once PRN Agitation 2/2 mood  ondansetron    Tablet 4 milliGRAM(s) Oral every 6 hours PRN Nausea/vomiting

## 2025-06-09 NOTE — BH INPATIENT PSYCHIATRY PROGRESS NOTE - NSBHCHARTREVIEWVS_PSY_A_CORE FT
Vital Signs Last 24 Hrs  T(C): --  T(F): --  HR: --  BP: --  BP(mean): --  RR: --  SpO2: --    Orthostatic VS  06-10-25 @ 07:39  Lying BP: --/-- HR: --  Sitting BP: 133/68 HR: 77  Standing BP: 120/66 HR: 85  Site: --  Mode: --  Orthostatic VS  06-09-25 @ 07:29  Lying BP: --/-- HR: --  Sitting BP: 129/60 HR: 78  Standing BP: 115/67 HR: 84  Site: --  Mode: --

## 2025-06-09 NOTE — BH INPATIENT PSYCHIATRY PROGRESS NOTE - NSBHASSESSSUMMFT_PSY_ALL_CORE
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 Binh Valles), PMH of DM, HLD, asthma, hypothyroid, fibromyalgia, denies past violence/NSSI/SA/substance, BIBS for depression and SI.    On assessment today, pt presented with depressed affect but was cooperative to being interviewed. Today, pt has been coming out of room, speaking to others on the unit and eating well, indicating improvement from previous assessment. Plan to continue to monitor on current medication regimen.     Plan:   -[facility], 9.13  -No CO needed, routine observation, q15 checks   -Psychiatry:   gabapentin 300 milliGRAM(s) Oral at bedtime  LORazepam     Tablet 0.5 milliGRAM(s) Oral three times a day  sertraline 50 milliGRAM(s) Oral daily  -Medical:   Pt requested Tylenol PM, pt already has acetaminophen PRN order, added diphenhydramine 25 mg PO QHS PRN for insomnia    nitrofuratntoin 100mg BID for 5 days (6/05 - 6/10) for UTI.   atorvastatin 10 milliGRAM(s) Oral at bedtime  ferrous    sulfate 325 milliGRAM(s) Oral at bedtime  fluticasone propionate/ salmeterol 100-50 MICROgram(s) Diskus 1 Dose(s) Inhalation two times a day  glucagon  Injectable 1 milliGRAM(s) IntraMuscular once  insulin lispro (ADMELOG) corrective regimen sliding scale   SubCutaneous three times a day before meals  levothyroxine 88 MICROGram(s) Oral <User Schedule>  metFORMIN 500 milliGRAM(s) Oral daily  metoprolol succinate ER 25 milliGRAM(s) Oral daily  moxifloxacin 0.5% Solution 1 Drop(s) Right EYE three times a day  nortriptyline 25 milliGRAM(s) Oral at bedtime  pancrelipase  (CREON 36,000 Lipase Units) 1 Capsule(s) Oral three times a day with meals  pantoprazole    Tablet 40 milliGRAM(s) Oral before breakfast  prednisoLONE acetate 1% Suspension 1 Drop(s) Right EYE three times a day  acetaminophen     Tablet .. 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 Binh Valles), PMH of DM, HLD, asthma, hypothyroid, fibromyalgia, denies past violence/NSSI/SA/substance, BIBS for depression and SI.    On assessment today, pt presented with depressed affect but was cooperative to being interviewed. Today, pt has been coming out of room, speaking to others on the unit and eating well, indicating improvement from previous assessment. Plan to continue to monitor on current medication regimen.     Plan:   -[facility] to [facility], 9.13  -No CO needed, routine observation, q15 checks   -Psychiatry:   gabapentin 300 milliGRAM(s) Oral at bedtime  LORazepam     Tablet 0.5 milliGRAM(s) Oral three times a day  sertraline 75 milliGRAM(s) Oral daily  -Medical:   Pt requested Tylenol PM, pt already has acetaminophen PRN order, added diphenhydramine 25 mg PO QHS PRN for insomnia    nitrofuratntoin 100mg BID for 5 days (6/05 - 6/10) for UTI.   atorvastatin 10 milliGRAM(s) Oral at bedtime  ferrous    sulfate 325 milliGRAM(s) Oral at bedtime  fluticasone propionate/ salmeterol 100-50 MICROgram(s) Diskus 1 Dose(s) Inhalation two times a day  glucagon  Injectable 1 milliGRAM(s) IntraMuscular once  insulin lispro (ADMELOG) corrective regimen sliding scale   SubCutaneous three times a day before meals  levothyroxine 88 MICROGram(s) Oral <User Schedule>  metFORMIN 500 milliGRAM(s) Oral daily  metoprolol succinate ER 25 milliGRAM(s) Oral daily  moxifloxacin 0.5% Solution 1 Drop(s) Right EYE three times a day  nortriptyline 25 milliGRAM(s) Oral at bedtime  pancrelipase  (CREON 36,000 Lipase Units) 1 Capsule(s) Oral three times a day with meals  pantoprazole    Tablet 40 milliGRAM(s) Oral before breakfast  prednisoLONE acetate 1% Suspension 1 Drop(s) Right EYE three times a day  acetaminophen     Tablet .. 650 milliGRAM(s) Oral every 6 hours PRN Mild Pain (1 - 3), Moderate Pain (4 - 6), Severe Pain (7 - 10)  albuterol    90 MICROgram(s) HFA Inhaler 2 Puff(s) Inhalation every 6 hours PRN Asthma  dextrose Oral Gel 15 Gram(s) Oral once PRN Blood Glucose LESS THAN 70 milliGRAM(s)/deciliter  EPINEPHrine     1 mG/mL Injectable 0.3 milliGRAM(s) IntraMuscular once PRN ANAPHYLAXIS  ondansetron    Tablet 4 milliGRAM(s) Oral every 6 hours PRN Nausea/vomiting  -Group and milieu therapy  -Dispo as per SW

## 2025-06-09 NOTE — BH INPATIENT PSYCHIATRY PROGRESS NOTE - NSBHFUPINTERVALHXFT_PSY_A_CORE
Case was reviewed and discussed at team meeting. Pt was seen separately by Namita Arrington NP. On presentation, pt was alert and cooperative with depressed affect, sitting in common spaces and speaking with others on the unit. Regarding her mood, pt says she feels "so-so" but that she does feel better compared to when she was admitted. Says she is tolerating medication well and hasn't had any side effects. Reports crying once yesterday morning and once again this morning. Otherwise, describes spending time in her room and not being interested in talking to many people. Has had continued anxiety and worry that she will have a panic attack. Counseled her to alert team if she thinks she's having a panic attack so she can receive help. Pt states she slept better overnight with diphenhydramine. Notes normal appetite, eating well. Pt continues to report chronic pain. Denies SI/HI/AVH.  Case was reviewed and discussed at team meeting. Pt was seen separately by Namita Arrington NP. On presentation, pt was alert and cooperative with depressed affect, sitting in common spaces and speaking with others on the unit. Regarding her mood, pt says she feels "so-so" but that she does feel better compared to when she was admitted. Says she is tolerating medication well and hasn't had any side effects. Reports crying once yesterday morning and once again this morning. Otherwise, describes spending time in her room and not being interested in talking to many people. Has had continued anxiety and worry that she will have a panic attack. Counseled her to alert team if she thinks she's having a panic attack so she can receive help. Pt states she slept better overnight with diphenhydramine. Notes normal appetite, eating well. Pt continues to report chronic pain. Denies SI/HI/AVH. discussed increasing Zoloft to better manage symptoms. Zoloft increased to 75mg.

## 2025-06-09 NOTE — BH INPATIENT PSYCHIATRY PROGRESS NOTE - NSBHFUPINTERVALHXFT_PSY_A_CORE
Pt case was reviewed and discussed with team. Pt was seen separately by Namita Arrington NP. On presentation, pt was alert, dressed casually, sitting in chair. Cooperative to being interviewed. Spoke softly with depressed affect. Adherent to medication, does not report any nausea, GI symptoms, or changes in energy. Pt states she slept poorly with crying episodes overnight. Has had decreased appetite today and did not eat breakfast, however, reports eating dinner last night. Continues to report chronic pain. Requesting medications to help her sleep, notes cyclobenzaprine 10mg nightly works well for her at home. Denies SI/HI/AVH.

## 2025-06-09 NOTE — BH INPATIENT PSYCHIATRY PROGRESS NOTE - NSBHATTESTCOMMENTATTENDFT_PSY_A_CORE
Care was discussed and reviewed in the interdisciplinary treatment team.  I, Liza Cope MD, have reviewed and verified the documentation.      Patient was seen and evaluated with the NP present during the assessment. It has been reviewed in detail with the medical student. Interview was conducted in [place] by patient's request. Patient reported that she is feeling better but remains depressed and not wanting to get out of her room. She said that she saw her mother struggling with the same symptoms and as admitted in the hospital for 3 months. Patient said that she doesn't want to go trought the same situation. She informed us that her believe in God and family are her protecting factors.   Patient was educate about her medications and the need to continue treatment. She also received educations about the use of benzodiazepines. Patient was in agreement with having her medications adjusted. She is currently denying SI and has been able to contract for safety.

## 2025-06-09 NOTE — BH INPATIENT PSYCHIATRY PROGRESS NOTE - CURRENT MEDICATION
MEDICATIONS  (STANDING):  atorvastatin 10 milliGRAM(s) Oral at bedtime  ferrous    sulfate 325 milliGRAM(s) Oral at bedtime  fluticasone propionate/ salmeterol 100-50 MICROgram(s) Diskus 1 Dose(s) Inhalation two times a day  gabapentin 300 milliGRAM(s) Oral at bedtime  glucagon  Injectable 1 milliGRAM(s) IntraMuscular once  insulin lispro (ADMELOG) corrective regimen sliding scale   SubCutaneous three times a day before meals  levothyroxine 88 MICROGram(s) Oral <User Schedule>  LORazepam     Tablet 0.5 milliGRAM(s) Oral three times a day  metFORMIN 500 milliGRAM(s) Oral daily  metoprolol succinate ER 25 milliGRAM(s) Oral daily  nitrofurantoin monohydrate/macrocrystals (MACROBID) 100 milliGRAM(s) Oral two times a day  nortriptyline 25 milliGRAM(s) Oral at bedtime  pancrelipase  (CREON 36,000 Lipase Units) 2 Capsule(s) Oral four times a day with meals  pantoprazole    Tablet 40 milliGRAM(s) Oral before breakfast  sertraline 75 milliGRAM(s) Oral daily    MEDICATIONS  (PRN):  acetaminophen     Tablet .. 650 milliGRAM(s) Oral every 6 hours PRN Mild Pain (1 - 3), Moderate Pain (4 - 6), Severe Pain (7 - 10)  albuterol    90 MICROgram(s) HFA Inhaler 2 Puff(s) Inhalation every 6 hours PRN Asthma  dextrose Oral Gel 15 Gram(s) Oral once PRN Blood Glucose LESS THAN 70 milliGRAM(s)/deciliter  diphenhydrAMINE 25 milliGRAM(s) Oral at bedtime PRN insomnia  EPINEPHrine     1 mG/mL Injectable 0.3 milliGRAM(s) IntraMuscular once PRN ANAPHYLAXIS  lidocaine   4% Patch 1 Patch Transdermal daily PRN Low back pain  LORazepam     Tablet 1 milliGRAM(s) Oral every 6 hours PRN Agitation 2/2 mood  LORazepam   Injectable 1 milliGRAM(s) IntraMuscular once PRN Agitation 2/2 mood  ondansetron    Tablet 4 milliGRAM(s) Oral every 6 hours PRN Nausea/vomiting   MEDICATIONS  (STANDING):  atorvastatin 10 milliGRAM(s) Oral at bedtime  ferrous    sulfate 325 milliGRAM(s) Oral at bedtime  fluticasone propionate/ salmeterol 100-50 MICROgram(s) Diskus 1 Dose(s) Inhalation two times a day  gabapentin 600 milliGRAM(s) Oral at bedtime  glucagon  Injectable 1 milliGRAM(s) IntraMuscular once  insulin lispro (ADMELOG) corrective regimen sliding scale   SubCutaneous three times a day before meals  levothyroxine 88 MICROGram(s) Oral <User Schedule>  LORazepam     Tablet 0.5 milliGRAM(s) Oral three times a day  metFORMIN 500 milliGRAM(s) Oral daily  metoprolol succinate ER 25 milliGRAM(s) Oral daily  nitrofurantoin monohydrate/macrocrystals (MACROBID) 100 milliGRAM(s) Oral two times a day  nortriptyline 25 milliGRAM(s) Oral at bedtime  pancrelipase  (CREON 36,000 Lipase Units) 2 Capsule(s) Oral four times a day with meals  pantoprazole    Tablet 40 milliGRAM(s) Oral before breakfast  sertraline 75 milliGRAM(s) Oral daily    MEDICATIONS  (PRN):  acetaminophen     Tablet .. 650 milliGRAM(s) Oral every 6 hours PRN Mild Pain (1 - 3), Moderate Pain (4 - 6), Severe Pain (7 - 10)  albuterol    90 MICROgram(s) HFA Inhaler 2 Puff(s) Inhalation every 6 hours PRN Asthma  dextrose Oral Gel 15 Gram(s) Oral once PRN Blood Glucose LESS THAN 70 milliGRAM(s)/deciliter  diphenhydrAMINE 25 milliGRAM(s) Oral at bedtime PRN insomnia  EPINEPHrine     1 mG/mL Injectable 0.3 milliGRAM(s) IntraMuscular once PRN ANAPHYLAXIS  lidocaine   4% Patch 1 Patch Transdermal daily PRN Low back pain  LORazepam     Tablet 1 milliGRAM(s) Oral every 6 hours PRN Agitation 2/2 mood  LORazepam   Injectable 1 milliGRAM(s) IntraMuscular once PRN Agitation 2/2 mood  ondansetron    Tablet 4 milliGRAM(s) Oral every 6 hours PRN Nausea/vomiting

## 2025-06-09 NOTE — BH INPATIENT PSYCHIATRY PROGRESS NOTE - NSBHMETABOLIC_PSY_ALL_CORE_FT
BMI: BMI (kg/m2): 21.6 (06-04-25 @ 11:20)  HbA1c: A1C with Estimated Average Glucose Result: 5.7 % (06-05-25 @ 08:00)    Glucose: POCT Blood Glucose.: 119 mg/dL (06-10-25 @ 07:56)    BP: --Vital Signs Last 24 Hrs  T(C): --  T(F): --  HR: --  BP: --  BP(mean): --  RR: --  SpO2: --    Orthostatic VS  06-10-25 @ 07:39  Lying BP: --/-- HR: --  Sitting BP: 133/68 HR: 77  Standing BP: 120/66 HR: 85  Site: --  Mode: --  Orthostatic VS  06-09-25 @ 07:29  Lying BP: --/-- HR: --  Sitting BP: 129/60 HR: 78  Standing BP: 115/67 HR: 84  Site: --  Mode: --    Lipid Panel: Date/Time: 06-05-25 @ 08:00  Cholesterol, Serum: 183  LDL Cholesterol Calculated: 97  HDL Cholesterol, Serum: 66  Total Cholesterol/HDL Ration Measurement: --  Triglycerides, Serum: 112

## 2025-06-09 NOTE — BH INPATIENT PSYCHIATRY PROGRESS NOTE - CURRENT MEDICATION
MEDICATIONS  (STANDING):  atorvastatin 10 milliGRAM(s) Oral at bedtime  ferrous    sulfate 325 milliGRAM(s) Oral at bedtime  fluticasone propionate/ salmeterol 100-50 MICROgram(s) Diskus 1 Dose(s) Inhalation two times a day  gabapentin 600 milliGRAM(s) Oral at bedtime  glucagon  Injectable 1 milliGRAM(s) IntraMuscular once  insulin lispro (ADMELOG) corrective regimen sliding scale   SubCutaneous three times a day before meals  levothyroxine 88 MICROGram(s) Oral <User Schedule>  LORazepam     Tablet 0.5 milliGRAM(s) Oral three times a day  metFORMIN 500 milliGRAM(s) Oral daily  metoprolol succinate ER 25 milliGRAM(s) Oral daily  nortriptyline 25 milliGRAM(s) Oral at bedtime  pancrelipase  (CREON 36,000 Lipase Units) 2 Capsule(s) Oral four times a day with meals  pantoprazole    Tablet 40 milliGRAM(s) Oral before breakfast  sertraline 75 milliGRAM(s) Oral daily    MEDICATIONS  (PRN):  acetaminophen     Tablet .. 650 milliGRAM(s) Oral every 6 hours PRN Mild Pain (1 - 3), Moderate Pain (4 - 6), Severe Pain (7 - 10)  albuterol    90 MICROgram(s) HFA Inhaler 2 Puff(s) Inhalation every 6 hours PRN Asthma  dextrose Oral Gel 15 Gram(s) Oral once PRN Blood Glucose LESS THAN 70 milliGRAM(s)/deciliter  diphenhydrAMINE 25 milliGRAM(s) Oral at bedtime PRN insomnia  EPINEPHrine     1 mG/mL Injectable 0.3 milliGRAM(s) IntraMuscular once PRN ANAPHYLAXIS  lidocaine   4% Patch 1 Patch Transdermal daily PRN Low back pain  LORazepam     Tablet 1 milliGRAM(s) Oral every 6 hours PRN Agitation 2/2 mood  LORazepam   Injectable 1 milliGRAM(s) IntraMuscular once PRN Agitation 2/2 mood  ondansetron    Tablet 4 milliGRAM(s) Oral every 6 hours PRN Nausea/vomiting

## 2025-06-09 NOTE — BH INPATIENT PSYCHIATRY PROGRESS NOTE - NSBHMETABOLIC_PSY_ALL_CORE_FT
BMI: BMI (kg/m2): 21.6 (06-04-25 @ 11:20)  HbA1c: A1C with Estimated Average Glucose Result: 5.7 % (06-05-25 @ 08:00)    Glucose: POCT Blood Glucose.: 105 mg/dL (06-09-25 @ 12:08)    BP: --Vital Signs Last 24 Hrs  T(C): 36.6 (06-09-25 @ 07:29), Max: 36.6 (06-09-25 @ 07:29)  T(F): 97.8 (06-09-25 @ 07:29), Max: 97.8 (06-09-25 @ 07:29)  HR: --  BP: --  BP(mean): --  RR: --  SpO2: --    Orthostatic VS  06-09-25 @ 07:29  Lying BP: --/-- HR: --  Sitting BP: 129/60 HR: 78  Standing BP: 115/67 HR: 84  Site: --  Mode: --  Orthostatic VS  06-08-25 @ 08:16  Lying BP: --/-- HR: --  Sitting BP: 122/62 HR: 77  Standing BP: 126/61 HR: 82  Site: --  Mode: --    Lipid Panel: Date/Time: 06-05-25 @ 08:00  Cholesterol, Serum: 183  LDL Cholesterol Calculated: 97  HDL Cholesterol, Serum: 66  Total Cholesterol/HDL Ration Measurement: --  Triglycerides, Serum: 112   BMI: BMI (kg/m2): 21.6 (06-04-25 @ 11:20)  HbA1c: A1C with Estimated Average Glucose Result: 5.7 % (06-05-25 @ 08:00)    Glucose: POCT Blood Glucose.: 119 mg/dL (06-09-25 @ 16:02)    BP: --Vital Signs Last 24 Hrs  T(C): 36.6 (06-09-25 @ 07:29), Max: 36.6 (06-09-25 @ 07:29)  T(F): 97.8 (06-09-25 @ 07:29), Max: 97.8 (06-09-25 @ 07:29)  HR: --  BP: --  BP(mean): --  RR: --  SpO2: --    Orthostatic VS  06-09-25 @ 07:29  Lying BP: --/-- HR: --  Sitting BP: 129/60 HR: 78  Standing BP: 115/67 HR: 84  Site: --  Mode: --  Orthostatic VS  06-08-25 @ 08:16  Lying BP: --/-- HR: --  Sitting BP: 122/62 HR: 77  Standing BP: 126/61 HR: 82  Site: --  Mode: --    Lipid Panel: Date/Time: 06-05-25 @ 08:00  Cholesterol, Serum: 183  LDL Cholesterol Calculated: 97  HDL Cholesterol, Serum: 66  Total Cholesterol/HDL Ration Measurement: --  Triglycerides, Serum: 112

## 2025-06-09 NOTE — BH INPATIENT PSYCHIATRY PROGRESS NOTE - NSBHASSESSSUMMFT_PSY_ALL_CORE
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 Binh Valles), PMH of DM, HLD, asthma, hypothyroid, fibromyalgia, denies past violence/NSSI/SA/substance, BIBS for depression and SI.    Pt presented similarly to yesterday. Given increase in sertraline dosage yesterday to 75mg, will continue to monitor for improvement and encourage participation in group.    Plan:   -[facility], [facility]  -No CO needed, routine observation, q15 checks   -Psychiatry:   gabapentin 300 milliGRAM(s) Oral at bedtime  LORazepam     Tablet 0.5 milliGRAM(s) Oral three times a day  sertraline 75 milliGRAM(s) Oral daily  -Medical:   Pt requested Tylenol PM, pt already has acetaminophen PRN order, added diphenhydramine 25 mg PO QHS PRN for insomnia    nitrofuratntoin 100mg BID for 5 days (6/05 - 6/10) for UTI.   atorvastatin 10 milliGRAM(s) Oral at bedtime  ferrous    sulfate 325 milliGRAM(s) Oral at bedtime  fluticasone propionate/ salmeterol 100-50 MICROgram(s) Diskus 1 Dose(s) Inhalation two times a day  glucagon  Injectable 1 milliGRAM(s) IntraMuscular once  insulin lispro (ADMELOG) corrective regimen sliding scale   SubCutaneous three times a day before meals  levothyroxine 88 MICROGram(s) Oral <User Schedule>  metFORMIN 500 milliGRAM(s) Oral daily  metoprolol succinate ER 25 milliGRAM(s) Oral daily  moxifloxacin 0.5% Solution 1 Drop(s) Right EYE three times a day  nortriptyline 25 milliGRAM(s) Oral at bedtime  pancrelipase  (CREON 36,000 Lipase Units) 1 Capsule(s) Oral three times a day with meals  pantoprazole    Tablet 40 milliGRAM(s) Oral before breakfast  prednisoLONE acetate 1% Suspension 1 Drop(s) Right EYE three times a day  acetaminophen     Tablet .. 650 milliGRAM(s) Oral every 6 hours PRN Mild Pain (1 - 3), Moderate Pain (4 - 6), Severe Pain (7 - 10)  albuterol    90 MICROgram(s) HFA Inhaler 2 Puff(s) Inhalation every 6 hours PRN Asthma  dextrose Oral Gel 15 Gram(s) Oral once PRN Blood Glucose LESS THAN 70 milliGRAM(s)/deciliter  EPINEPHrine     1 mG/mL Injectable 0.3 milliGRAM(s) IntraMuscular once PRN ANAPHYLAXIS  ondansetron    Tablet 4 milliGRAM(s) Oral every 6 hours PRN Nausea/vomiting  -Group and milieu therapy  -Dispo as per SW

## 2025-06-10 LAB
GLUCOSE BLDC GLUCOMTR-MCNC: 110 MG/DL — HIGH (ref 70–99)
GLUCOSE BLDC GLUCOMTR-MCNC: 119 MG/DL — HIGH (ref 70–99)
GLUCOSE BLDC GLUCOMTR-MCNC: 126 MG/DL — HIGH (ref 70–99)
GLUCOSE BLDC GLUCOMTR-MCNC: 156 MG/DL — HIGH (ref 70–99)

## 2025-06-10 PROCEDURE — 99232 SBSQ HOSP IP/OBS MODERATE 35: CPT | Mod: FS

## 2025-06-10 RX ADMIN — Medication 0.5 MILLIGRAM(S): at 08:51

## 2025-06-10 RX ADMIN — Medication 325 MILLIGRAM(S): at 21:24

## 2025-06-10 RX ADMIN — Medication 25 MILLIGRAM(S): at 21:27

## 2025-06-10 RX ADMIN — Medication 2 CAPSULE(S): at 12:48

## 2025-06-10 RX ADMIN — Medication 0.5 MILLIGRAM(S): at 21:24

## 2025-06-10 RX ADMIN — ATORVASTATIN CALCIUM 10 MILLIGRAM(S): 80 TABLET, FILM COATED ORAL at 21:25

## 2025-06-10 RX ADMIN — Medication 88 MICROGRAM(S): at 05:43

## 2025-06-10 RX ADMIN — Medication 100 MILLIGRAM(S): at 08:51

## 2025-06-10 RX ADMIN — METOPROLOL SUCCINATE 25 MILLIGRAM(S): 50 TABLET, EXTENDED RELEASE ORAL at 08:51

## 2025-06-10 RX ADMIN — Medication 1 DOSE(S): at 21:26

## 2025-06-10 RX ADMIN — Medication 0.5 MILLIGRAM(S): at 12:46

## 2025-06-10 RX ADMIN — GABAPENTIN 600 MILLIGRAM(S): 400 CAPSULE ORAL at 21:24

## 2025-06-10 RX ADMIN — Medication 25 MILLIGRAM(S): at 22:52

## 2025-06-10 RX ADMIN — SERTRALINE 75 MILLIGRAM(S): 100 TABLET, FILM COATED ORAL at 08:50

## 2025-06-10 RX ADMIN — Medication 40 MILLIGRAM(S): at 08:51

## 2025-06-10 NOTE — BH INPATIENT PSYCHIATRY PROGRESS NOTE - NSBHATTESTCOMMENTATTENDFT_PSY_A_CORE
Care was discussed and reviewed in the interdisciplinary treatment team.  I, Liza Cope MD, have reviewed and verified the documentation.      Patient was seen and evaluated with the NP present during the assessment. It has been reviewed in detail with the medical student. Interview was conducted in Fijian by patient's request. Patient reported that she is feeling better but remains depressed and not wanting to get out of her room. She said that she saw her mother struggling with the same symptoms and as admitted in the hospital for 3 months. Patient said that she doesn't want to go trought the same situation. She informed us that her believe in God and family are her protecting factors.   Patient was educate about her medications and the need to continue treatment. She also received educations about the use of benzodiazepines. Patient was in agreement with having her medications adjusted. She is currently denying SI and has been able to contract for safety.  Care was discussed and reviewed in the interdisciplinary treatment team.  I, Liza Cope MD, have reviewed and verified the documentation.

## 2025-06-10 NOTE — BH INPATIENT PSYCHIATRY PROGRESS NOTE - NSBHFUPINTERVALHXFT_PSY_A_CORE
Pt case was reviewed and discussed with team. Pt was seen separately by Namita Arrington NP. On presentation, pt was alert, dressed casually, sitting in chair. Cooperative to being interviewed. Spoke softly with depressed affect. Adherent to medication, does not report any nausea, GI symptoms, or changes in energy. Pt states she slept poorly with crying episodes overnight. Has had decreased appetite today and did not eat breakfast, however, reports eating dinner last night. Continues to report chronic pain. Requesting medications to help her sleep, notes cyclobenzaprine 10mg nightly works well for her at home. Denies SI/HI/AVH.  Pt case was reviewed and discussed with team. Pt was seen separately by Namita Arrington NP. On presentation, pt was alert, dressed casually, sitting in chair. Cooperative to being interviewed. Spoke softly with depressed affect. Adherent to medication, tolerating increase of dose Zoloft and Gabapentin, does not report any nausea, GI symptoms, or changes in energy. Pt states she slept poorly with crying episodes overnight. Has had decreased appetite today and did not eat breakfast, however, reports eating dinner last night. Continues to report chronic pain. Requesting medications to help her sleep, notes cyclobenzaprine 10mg nightly works well for her at home. Denies SI/HI/AVH.

## 2025-06-10 NOTE — BH INPATIENT PSYCHIATRY PROGRESS NOTE - NSBHMETABOLIC_PSY_ALL_CORE_FT
BMI: BMI (kg/m2): 21.6 (06-04-25 @ 11:20)  HbA1c: A1C with Estimated Average Glucose Result: 5.7 % (06-05-25 @ 08:00)    Glucose: POCT Blood Glucose.: 119 mg/dL (06-10-25 @ 07:56)    BP: --Vital Signs Last 24 Hrs  T(C): --  T(F): --  HR: --  BP: --  BP(mean): --  RR: --  SpO2: --    Orthostatic VS  06-10-25 @ 07:39  Lying BP: --/-- HR: --  Sitting BP: 133/68 HR: 77  Standing BP: 120/66 HR: 85  Site: --  Mode: --  Orthostatic VS  06-09-25 @ 07:29  Lying BP: --/-- HR: --  Sitting BP: 129/60 HR: 78  Standing BP: 115/67 HR: 84  Site: --  Mode: --    Lipid Panel: Date/Time: 06-05-25 @ 08:00  Cholesterol, Serum: 183  LDL Cholesterol Calculated: 97  HDL Cholesterol, Serum: 66  Total Cholesterol/HDL Ration Measurement: --  Triglycerides, Serum: 112   BMI: BMI (kg/m2): 21.6 (06-04-25 @ 11:20)  HbA1c: A1C with Estimated Average Glucose Result: 5.7 % (06-05-25 @ 08:00)    Glucose: POCT Blood Glucose.: 110 mg/dL (06-10-25 @ 12:04)    BP: --Vital Signs Last 24 Hrs  T(C): --  T(F): --  HR: --  BP: --  BP(mean): --  RR: --  SpO2: --    Orthostatic VS  06-10-25 @ 07:39  Lying BP: --/-- HR: --  Sitting BP: 133/68 HR: 77  Standing BP: 120/66 HR: 85  Site: --  Mode: --  Orthostatic VS  06-09-25 @ 07:29  Lying BP: --/-- HR: --  Sitting BP: 129/60 HR: 78  Standing BP: 115/67 HR: 84  Site: --  Mode: --    Lipid Panel: Date/Time: 06-05-25 @ 08:00  Cholesterol, Serum: 183  LDL Cholesterol Calculated: 97  HDL Cholesterol, Serum: 66  Total Cholesterol/HDL Ration Measurement: --  Triglycerides, Serum: 112   BMI: BMI (kg/m2): 21.6 (06-04-25 @ 11:20)  HbA1c: A1C with Estimated Average Glucose Result: 5.7 % (06-05-25 @ 08:00)    Glucose: POCT Blood Glucose.: 156 mg/dL (06-10-25 @ 20:21)    BP: --Vital Signs Last 24 Hrs  T(C): --  T(F): --  HR: --  BP: --  BP(mean): --  RR: --  SpO2: --    Orthostatic VS  06-10-25 @ 07:39  Lying BP: --/-- HR: --  Sitting BP: 133/68 HR: 77  Standing BP: 120/66 HR: 85  Site: --  Mode: --  Orthostatic VS  06-09-25 @ 07:29  Lying BP: --/-- HR: --  Sitting BP: 129/60 HR: 78  Standing BP: 115/67 HR: 84  Site: --  Mode: --    Lipid Panel: Date/Time: 06-05-25 @ 08:00  Cholesterol, Serum: 183  LDL Cholesterol Calculated: 97  HDL Cholesterol, Serum: 66  Total Cholesterol/HDL Ration Measurement: --  Triglycerides, Serum: 112

## 2025-06-10 NOTE — BH INPATIENT PSYCHIATRY PROGRESS NOTE - NSBHASSESSSUMMFT_PSY_ALL_CORE
Pt is a 61 yo woman, , helps take care of her father, on disability, PPH of MDD and panic attacks, in outpt tx w a psychiatrist (Dr. Binh Valles), PMH of DM, HLD, asthma, hypothyroid, fibromyalgia, denies past violence/NSSI/SA/substance, BIBS for depression and SI.    Pt presented similarly to yesterday. Given increase in sertraline dosage yesterday to 75mg, will continue to monitor for improvement and encourage participation in group.    Plan:   -[facility], [facility]  -No CO needed, routine observation, q15 checks   -Psychiatry:   gabapentin 300 milliGRAM(s) Oral at bedtime  LORazepam     Tablet 0.5 milliGRAM(s) Oral three times a day  sertraline 75 milliGRAM(s) Oral daily  -Medical:   Pt requested Tylenol PM, pt already has acetaminophen PRN order, added diphenhydramine 25 mg PO QHS PRN for insomnia    nitrofuratntoin 100mg BID for 5 days (6/05 - 6/10) for UTI.   atorvastatin 10 milliGRAM(s) Oral at bedtime  ferrous    sulfate 325 milliGRAM(s) Oral at bedtime  fluticasone propionate/ salmeterol 100-50 MICROgram(s) Diskus 1 Dose(s) Inhalation two times a day  glucagon  Injectable 1 milliGRAM(s) IntraMuscular once  insulin lispro (ADMELOG) corrective regimen sliding scale   SubCutaneous three times a day before meals  levothyroxine 88 MICROGram(s) Oral <User Schedule>  metFORMIN 500 milliGRAM(s) Oral daily  metoprolol succinate ER 25 milliGRAM(s) Oral daily  moxifloxacin 0.5% Solution 1 Drop(s) Right EYE three times a day  nortriptyline 25 milliGRAM(s) Oral at bedtime  pancrelipase  (CREON 36,000 Lipase Units) 1 Capsule(s) Oral three times a day with meals  pantoprazole    Tablet 40 milliGRAM(s) Oral before breakfast  prednisoLONE acetate 1% Suspension 1 Drop(s) Right EYE three times a day  acetaminophen     Tablet .. 650 milliGRAM(s) Oral every 6 hours PRN Mild Pain (1 - 3), Moderate Pain (4 - 6), Severe Pain (7 - 10)  albuterol    90 MICROgram(s) HFA Inhaler 2 Puff(s) Inhalation every 6 hours PRN Asthma  dextrose Oral Gel 15 Gram(s) Oral once PRN Blood Glucose LESS THAN 70 milliGRAM(s)/deciliter  EPINEPHrine     1 mG/mL Injectable 0.3 milliGRAM(s) IntraMuscular once PRN ANAPHYLAXIS  ondansetron    Tablet 4 milliGRAM(s) Oral every 6 hours PRN Nausea/vomiting  -Group and milieu therapy  -Dispo as per SW

## 2025-06-11 LAB
GLUCOSE BLDC GLUCOMTR-MCNC: 109 MG/DL — HIGH (ref 70–99)
GLUCOSE BLDC GLUCOMTR-MCNC: 110 MG/DL — HIGH (ref 70–99)
GLUCOSE BLDC GLUCOMTR-MCNC: 142 MG/DL — HIGH (ref 70–99)
GLUCOSE BLDC GLUCOMTR-MCNC: 150 MG/DL — HIGH (ref 70–99)

## 2025-06-11 PROCEDURE — 99232 SBSQ HOSP IP/OBS MODERATE 35: CPT | Mod: FS

## 2025-06-11 RX ORDER — SERTRALINE 100 MG/1
100 TABLET, FILM COATED ORAL DAILY
Refills: 0 | Status: DISCONTINUED | OUTPATIENT
Start: 2025-06-11 | End: 2025-06-24

## 2025-06-11 RX ADMIN — Medication 88 MICROGRAM(S): at 05:45

## 2025-06-11 RX ADMIN — Medication 2 CAPSULE(S): at 08:22

## 2025-06-11 RX ADMIN — Medication 25 MILLIGRAM(S): at 20:49

## 2025-06-11 RX ADMIN — Medication 325 MILLIGRAM(S): at 20:49

## 2025-06-11 RX ADMIN — Medication 1 DOSE(S): at 09:23

## 2025-06-11 RX ADMIN — METOPROLOL SUCCINATE 25 MILLIGRAM(S): 50 TABLET, EXTENDED RELEASE ORAL at 09:23

## 2025-06-11 RX ADMIN — ATORVASTATIN CALCIUM 10 MILLIGRAM(S): 80 TABLET, FILM COATED ORAL at 20:49

## 2025-06-11 RX ADMIN — Medication 0.5 MILLIGRAM(S): at 20:49

## 2025-06-11 RX ADMIN — Medication 2 CAPSULE(S): at 16:49

## 2025-06-11 RX ADMIN — Medication 40 MILLIGRAM(S): at 08:06

## 2025-06-11 RX ADMIN — Medication 1 DOSE(S): at 20:49

## 2025-06-11 RX ADMIN — SERTRALINE 75 MILLIGRAM(S): 100 TABLET, FILM COATED ORAL at 09:23

## 2025-06-11 RX ADMIN — Medication 0.5 MILLIGRAM(S): at 12:26

## 2025-06-11 RX ADMIN — METFORMIN HYDROCHLORIDE 500 MILLIGRAM(S): 500 TABLET ORAL at 09:23

## 2025-06-11 RX ADMIN — GABAPENTIN 600 MILLIGRAM(S): 400 CAPSULE ORAL at 20:49

## 2025-06-11 RX ADMIN — Medication 0.5 MILLIGRAM(S): at 09:23

## 2025-06-11 RX ADMIN — Medication 2 CAPSULE(S): at 12:26

## 2025-06-11 NOTE — BH INPATIENT PSYCHIATRY PROGRESS NOTE - NSBHATTESTCOMMENTATTENDFT_PSY_A_CORE
Care was discussed and reviewed in the interdisciplinary treatment team.  I, Liza Cope MD, have reviewed and verified the documentation.       Care was discussed and reviewed in the interdisciplinary treatment team.  I, Liza Cope MD, have reviewed and verified the documentation.      Patient was seen and evaluated with the student present during the assessment. Patient reported that she is feeling better as she was able to sleep last night. She is less anxious and feels better about going home. She denies experiencing any panic attacks related to her going back home. Denies SI at the moment of the assessment and has been able to contract for safety.

## 2025-06-11 NOTE — BH INPATIENT PSYCHIATRY PROGRESS NOTE - NSBHASSESSSUMMFT_PSY_ALL_CORE
Pt is a 61 yo woman, , helps take care of her father, on disability, PPH of MDD and panic attacks, in outpt tx w a psychiatrist (Dr. Binh Valles), PMH of DM, HLD, asthma, hypothyroid, fibromyalgia, denies past violence/NSSI/SA/substance, BIBS for depression and SI.    On assessment, pt calmer than yesterday, reports feeling happier and significantly improved. Will continue to monitor for improvement and encourage participation in group.    Plan:   -[facility], [facility].13  -No CO needed, routine observation, q15 checks   -Psychiatry:   gabapentin 300 milliGRAM(s) Oral at bedtime  LORazepam     Tablet 0.5 milliGRAM(s) Oral three times a day  sertraline 75 milliGRAM(s) Oral daily  -Medical:   Pt requested Tylenol PM, pt already has acetaminophen PRN order, added diphenhydramine 25 mg PO QHS PRN for insomnia    nitrofuratntoin 100mg BID for 5 days (6/05 - 6/10) for UTI.   atorvastatin 10 milliGRAM(s) Oral at bedtime  ferrous    sulfate 325 milliGRAM(s) Oral at bedtime  fluticasone propionate/ salmeterol 100-50 MICROgram(s) Diskus 1 Dose(s) Inhalation two times a day  glucagon  Injectable 1 milliGRAM(s) IntraMuscular once  insulin lispro (ADMELOG) corrective regimen sliding scale   SubCutaneous three times a day before meals  levothyroxine 88 MICROGram(s) Oral <User Schedule>  metFORMIN 500 milliGRAM(s) Oral daily  metoprolol succinate ER 25 milliGRAM(s) Oral daily  moxifloxacin 0.5% Solution 1 Drop(s) Right EYE three times a day  nortriptyline 25 milliGRAM(s) Oral at bedtime  pancrelipase  (CREON 36,000 Lipase Units) 1 Capsule(s) Oral three times a day with meals  pantoprazole    Tablet 40 milliGRAM(s) Oral before breakfast  prednisoLONE acetate 1% Suspension 1 Drop(s) Right EYE three times a day  acetaminophen     Tablet .. 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 Binh Valles), PMH of DM, HLD, asthma, hypothyroid, fibromyalgia, denies past violence/NSSI/SA/substance, BIBS for depression and SI.    On assessment, pt calmer than yesterday, reports feeling happier and significantly improved. Will continue to monitor for improvement and encourage participation in group.    Plan:   -[facility], 9.13  -No CO needed, routine observation, q15 checks   -Psychiatry:   gabapentin 600 milliGRAM(s) Oral at bedtime  LORazepam     Tablet 0.5 milliGRAM(s) Oral three times a day  sertraline 75 milliGRAM(s) Oral daily  -Medical:   Pt requested Tylenol PM, pt already has acetaminophen PRN order, added diphenhydramine 25 mg PO QHS PRN for insomnia    nitrofuratntoin 100mg BID for 5 days (6/05 - 6/10) for UTI.   atorvastatin 10 milliGRAM(s) Oral at bedtime  ferrous    sulfate 325 milliGRAM(s) Oral at bedtime  fluticasone propionate/ salmeterol 100-50 MICROgram(s) Diskus 1 Dose(s) Inhalation two times a day  glucagon  Injectable 1 milliGRAM(s) IntraMuscular once  insulin lispro (ADMELOG) corrective regimen sliding scale   SubCutaneous three times a day before meals  levothyroxine 88 MICROGram(s) Oral <User Schedule>  metFORMIN 500 milliGRAM(s) Oral daily  metoprolol succinate ER 25 milliGRAM(s) Oral daily  moxifloxacin 0.5% Solution 1 Drop(s) Right EYE three times a day  nortriptyline 25 milliGRAM(s) Oral at bedtime  pancrelipase  (CREON 36,000 Lipase Units) 1 Capsule(s) Oral three times a day with meals  pantoprazole    Tablet 40 milliGRAM(s) Oral before breakfast  prednisoLONE acetate 1% Suspension 1 Drop(s) Right EYE three times a day  acetaminophen     Tablet .. 650 milliGRAM(s) Oral every 6 hours PRN Mild Pain (1 - 3), Moderate Pain (4 - 6), Severe Pain (7 - 10)  albuterol    90 MICROgram(s) HFA Inhaler 2 Puff(s) Inhalation every 6 hours PRN Asthma  dextrose Oral Gel 15 Gram(s) Oral once PRN Blood Glucose LESS THAN 70 milliGRAM(s)/deciliter  EPINEPHrine     1 mG/mL Injectable 0.3 milliGRAM(s) IntraMuscular once PRN ANAPHYLAXIS  ondansetron    Tablet 4 milliGRAM(s) Oral every 6 hours PRN Nausea/vomiting  -Group and milieu therapy  -Dispo as per SW

## 2025-06-11 NOTE — BH INPATIENT PSYCHIATRY PROGRESS NOTE - NSBHMETABOLIC_PSY_ALL_CORE_FT
BMI: BMI (kg/m2): 21.6 (06-04-25 @ 11:20)  HbA1c: A1C with Estimated Average Glucose Result: 5.7 % (06-05-25 @ 08:00)    Glucose: POCT Blood Glucose.: 110 mg/dL (06-11-25 @ 08:04)    BP: --Vital Signs Last 24 Hrs  T(C): 36.4 (06-11-25 @ 07:22), Max: 36.4 (06-11-25 @ 07:22)  T(F): 97.6 (06-11-25 @ 07:22), Max: 97.6 (06-11-25 @ 07:22)  HR: --  BP: --  BP(mean): --  RR: 18 (06-11-25 @ 07:22) (18 - 18)  SpO2: --    Orthostatic VS  06-11-25 @ 07:22  Lying BP: --/-- HR: --  Sitting BP: 118/60 HR: 79  Standing BP: 106/60 HR: 83  Site: --  Mode: --  Orthostatic VS  06-10-25 @ 07:39  Lying BP: --/-- HR: --  Sitting BP: 133/68 HR: 77  Standing BP: 120/66 HR: 85  Site: --  Mode: --    Lipid Panel: Date/Time: 06-05-25 @ 08:00  Cholesterol, Serum: 183  LDL Cholesterol Calculated: 97  HDL Cholesterol, Serum: 66  Total Cholesterol/HDL Ration Measurement: --  Triglycerides, Serum: 112   BMI: BMI (kg/m2): 21.6 (06-04-25 @ 11:20)  HbA1c: A1C with Estimated Average Glucose Result: 5.7 % (06-05-25 @ 08:00)    Glucose: POCT Blood Glucose.: 109 mg/dL (06-11-25 @ 12:03)    BP: --Vital Signs Last 24 Hrs  T(C): 36.4 (06-11-25 @ 07:22), Max: 36.4 (06-11-25 @ 07:22)  T(F): 97.6 (06-11-25 @ 07:22), Max: 97.6 (06-11-25 @ 07:22)  HR: --  BP: --  BP(mean): --  RR: 18 (06-11-25 @ 07:22) (18 - 18)  SpO2: --    Orthostatic VS  06-11-25 @ 07:22  Lying BP: --/-- HR: --  Sitting BP: 118/60 HR: 79  Standing BP: 106/60 HR: 83  Site: --  Mode: --  Orthostatic VS  06-10-25 @ 07:39  Lying BP: --/-- HR: --  Sitting BP: 133/68 HR: 77  Standing BP: 120/66 HR: 85  Site: --  Mode: --    Lipid Panel: Date/Time: 06-05-25 @ 08:00  Cholesterol, Serum: 183  LDL Cholesterol Calculated: 97  HDL Cholesterol, Serum: 66  Total Cholesterol/HDL Ration Measurement: --  Triglycerides, Serum: 112   BMI: BMI (kg/m2): 21.6 (06-04-25 @ 11:20)  HbA1c: A1C with Estimated Average Glucose Result: 5.7 % (06-05-25 @ 08:00)    Glucose: POCT Blood Glucose.: 150 mg/dL (06-11-25 @ 16:30)    BP: --Vital Signs Last 24 Hrs  T(C): 36.4 (06-11-25 @ 07:22), Max: 36.4 (06-11-25 @ 07:22)  T(F): 97.6 (06-11-25 @ 07:22), Max: 97.6 (06-11-25 @ 07:22)  HR: --  BP: --  BP(mean): --  RR: 18 (06-11-25 @ 07:22) (18 - 18)  SpO2: --    Orthostatic VS  06-11-25 @ 07:22  Lying BP: --/-- HR: --  Sitting BP: 118/60 HR: 79  Standing BP: 106/60 HR: 83  Site: --  Mode: --  Orthostatic VS  06-10-25 @ 07:39  Lying BP: --/-- HR: --  Sitting BP: 133/68 HR: 77  Standing BP: 120/66 HR: 85  Site: --  Mode: --    Lipid Panel: Date/Time: 06-05-25 @ 08:00  Cholesterol, Serum: 183  LDL Cholesterol Calculated: 97  HDL Cholesterol, Serum: 66  Total Cholesterol/HDL Ration Measurement: --  Triglycerides, Serum: 112

## 2025-06-11 NOTE — BH INPATIENT PSYCHIATRY PROGRESS NOTE - NSBHFUPINTERVALHXFT_PSY_A_CORE
Pt case was reviewed and discussed with team. Pt was seen separately by Namita Arrington NP. Is tolerating increased dosage of Zoloft and Gabapentin.On presentation, pt was alert, dressed casually, sitting in chair. Cooperative to being interviewed. Pt was in a pleasant mood, described feeling much better today because she slept well. Denies any tearfulness or depressed mood. Took diphenhydramine last night. Reports feeling dizzy on waking up today, orthostatic BP WNL. Appetite is normal, denies any nausea, constipation or diarrhea. Denies any tremors or increased sweating. Interested in speaking with others on the unit and spending time in common spaces. Attended group yesterday and found it helpful to discuss coping strategies. Denies SI/HI/AVH. Pt case was reviewed and discussed with team. Pt was seen separately by Namita Arrington NP. Is tolerating increased dosage of Zoloft and Gabapentin. On presentation, pt was alert, dressed casually, sitting in chair. Cooperative to being interviewed. Pt was in a pleasant mood, described feeling much better today because she slept well. Denies any tearfulness or depressed mood. Took diphenhydramine last night. Reports feeling dizzy on waking up today, BP WNL. Appetite is normal, denies any nausea, constipation or diarrhea. Denies any tremors or increased sweating. Interested in speaking with others on the unit and spending time in common spaces. Attended group yesterday and found it helpful to discuss coping strategies. Denies SI/HI/AVH. Pt case was reviewed and discussed with team. Pt was seen separately by Namita Arrington NP. Is tolerating increased dosage of Zoloft and Gabapentin. On presentation, pt was alert, dressed casually, sitting in chair. Cooperative to being interviewed. Pt was in a pleasant mood, described feeling much better today because she slept well. Denies any tearfulness or depressed mood. Took diphenhydramine last night. Reports feeling dizzy on waking up today, BP WNL. Appetite is normal, denies any nausea, constipation or diarrhea. Denies any tremors or increased sweating. Interested in speaking with others on the unit and spending time in common spaces. Attended group yesterday and found it helpful to discuss coping strategies. Denies SI/HI/AVH.  When seen again with Dr. Parekh, pt continued to endorse feeling significantly improved. Notes that her daughter told her that her  would consider going to therapy to improve their marriage. Is interested in discharge. Would like psychiatry and therapist follow-ups with [place] speaking provider. Pt case was reviewed and discussed with team. Pt was seen separately by Namita Arrington NP. Is tolerating increased dosage of Zoloft and Gabapentin. On presentation, pt was alert, dressed casually, sitting in chair. Cooperative to being interviewed. Pt was in a pleasant mood, described feeling much better today because she slept well. Denies any tearfulness or depressed mood. Took diphenhydramine last night. Reports feeling dizzy on waking up today, BP WNL. Appetite is normal, denies any nausea, constipation or diarrhea. Denies any tremors or increased sweating. Interested in speaking with others on the unit and spending time in common spaces. Attended group yesterday and found it helpful to discuss coping strategies. Denies SI/HI/AVH.  When seen again with Dr. Parekh, pt continued to endorse feeling significantly improved. Notes that her daughter told her that  would consider going to therapy to improve their marriage. Is interested in discharge. Would like psychiatry and therapist follow-ups with [place] speaking provider.

## 2025-06-11 NOTE — BH INPATIENT PSYCHIATRY PROGRESS NOTE - CURRENT MEDICATION
MEDICATIONS  (STANDING):  atorvastatin 10 milliGRAM(s) Oral at bedtime  ferrous    sulfate 325 milliGRAM(s) Oral at bedtime  fluticasone propionate/ salmeterol 100-50 MICROgram(s) Diskus 1 Dose(s) Inhalation two times a day  gabapentin 600 milliGRAM(s) Oral at bedtime  glucagon  Injectable 1 milliGRAM(s) IntraMuscular once  insulin lispro (ADMELOG) corrective regimen sliding scale   SubCutaneous three times a day before meals  levothyroxine 88 MICROGram(s) Oral <User Schedule>  LORazepam     Tablet 0.5 milliGRAM(s) Oral three times a day  metFORMIN 500 milliGRAM(s) Oral daily  metoprolol succinate ER 25 milliGRAM(s) Oral daily  nortriptyline 25 milliGRAM(s) Oral at bedtime  pancrelipase  (CREON 36,000 Lipase Units) 2 Capsule(s) Oral four times a day with meals  pantoprazole    Tablet 40 milliGRAM(s) Oral before breakfast  sertraline 75 milliGRAM(s) Oral daily    MEDICATIONS  (PRN):  acetaminophen     Tablet .. 650 milliGRAM(s) Oral every 6 hours PRN Mild Pain (1 - 3), Moderate Pain (4 - 6), Severe Pain (7 - 10)  albuterol    90 MICROgram(s) HFA Inhaler 2 Puff(s) Inhalation every 6 hours PRN Asthma  dextrose Oral Gel 15 Gram(s) Oral once PRN Blood Glucose LESS THAN 70 milliGRAM(s)/deciliter  diphenhydrAMINE 25 milliGRAM(s) Oral at bedtime PRN insomnia  EPINEPHrine     1 mG/mL Injectable 0.3 milliGRAM(s) IntraMuscular once PRN ANAPHYLAXIS  lidocaine   4% Patch 1 Patch Transdermal daily PRN Low back pain  LORazepam     Tablet 1 milliGRAM(s) Oral every 6 hours PRN Agitation 2/2 mood  LORazepam   Injectable 1 milliGRAM(s) IntraMuscular once PRN Agitation 2/2 mood  ondansetron    Tablet 4 milliGRAM(s) Oral every 6 hours PRN Nausea/vomiting   MEDICATIONS  (STANDING):  atorvastatin 10 milliGRAM(s) Oral at bedtime  ferrous    sulfate 325 milliGRAM(s) Oral at bedtime  fluticasone propionate/ salmeterol 100-50 MICROgram(s) Diskus 1 Dose(s) Inhalation two times a day  gabapentin 600 milliGRAM(s) Oral at bedtime  glucagon  Injectable 1 milliGRAM(s) IntraMuscular once  insulin lispro (ADMELOG) corrective regimen sliding scale   SubCutaneous three times a day before meals  levothyroxine 88 MICROGram(s) Oral <User Schedule>  LORazepam     Tablet 0.5 milliGRAM(s) Oral three times a day  metFORMIN 500 milliGRAM(s) Oral daily  metoprolol succinate ER 25 milliGRAM(s) Oral daily  nortriptyline 25 milliGRAM(s) Oral at bedtime  pancrelipase  (CREON 36,000 Lipase Units) 2 Capsule(s) Oral four times a day with meals  pantoprazole    Tablet 40 milliGRAM(s) Oral before breakfast  sertraline 100 milliGRAM(s) Oral daily    MEDICATIONS  (PRN):  acetaminophen     Tablet .. 650 milliGRAM(s) Oral every 6 hours PRN Mild Pain (1 - 3), Moderate Pain (4 - 6), Severe Pain (7 - 10)  albuterol    90 MICROgram(s) HFA Inhaler 2 Puff(s) Inhalation every 6 hours PRN Asthma  dextrose Oral Gel 15 Gram(s) Oral once PRN Blood Glucose LESS THAN 70 milliGRAM(s)/deciliter  diphenhydrAMINE 25 milliGRAM(s) Oral at bedtime PRN insomnia  EPINEPHrine     1 mG/mL Injectable 0.3 milliGRAM(s) IntraMuscular once PRN ANAPHYLAXIS  lidocaine   4% Patch 1 Patch Transdermal daily PRN Low back pain  LORazepam     Tablet 1 milliGRAM(s) Oral every 6 hours PRN Agitation 2/2 mood  LORazepam   Injectable 1 milliGRAM(s) IntraMuscular once PRN Agitation 2/2 mood  ondansetron    Tablet 4 milliGRAM(s) Oral every 6 hours PRN Nausea/vomiting

## 2025-06-11 NOTE — BH INPATIENT PSYCHIATRY PROGRESS NOTE - NSBHCHARTREVIEWVS_PSY_A_CORE FT
Vital Signs Last 24 Hrs  T(C): 36.4 (06-11-25 @ 07:22), Max: 36.4 (06-11-25 @ 07:22)  T(F): 97.6 (06-11-25 @ 07:22), Max: 97.6 (06-11-25 @ 07:22)  HR: --  BP: --  BP(mean): --  RR: 18 (06-11-25 @ 07:22) (18 - 18)  SpO2: --    Orthostatic VS  06-11-25 @ 07:22  Lying BP: --/-- HR: --  Sitting BP: 118/60 HR: 79  Standing BP: 106/60 HR: 83  Site: --  Mode: --  Orthostatic VS  06-10-25 @ 07:39  Lying BP: --/-- HR: --  Sitting BP: 133/68 HR: 77  Standing BP: 120/66 HR: 85  Site: --  Mode: --

## 2025-06-12 LAB
ALBUMIN SERPL ELPH-MCNC: 3.9 G/DL — SIGNIFICANT CHANGE UP (ref 3.3–5)
ALP SERPL-CCNC: 114 U/L — SIGNIFICANT CHANGE UP (ref 40–120)
ALT FLD-CCNC: 13 U/L — SIGNIFICANT CHANGE UP (ref 4–33)
ANION GAP SERPL CALC-SCNC: 11 MMOL/L — SIGNIFICANT CHANGE UP (ref 7–14)
APPEARANCE UR: CLEAR — SIGNIFICANT CHANGE UP
AST SERPL-CCNC: 13 U/L — SIGNIFICANT CHANGE UP (ref 4–32)
BACTERIA # UR AUTO: NEGATIVE /HPF — SIGNIFICANT CHANGE UP
BILIRUB SERPL-MCNC: <0.2 MG/DL — SIGNIFICANT CHANGE UP (ref 0.2–1.2)
BILIRUB UR-MCNC: NEGATIVE — SIGNIFICANT CHANGE UP
BUN SERPL-MCNC: 8 MG/DL — SIGNIFICANT CHANGE UP (ref 7–23)
CALCIUM SERPL-MCNC: 9.5 MG/DL — SIGNIFICANT CHANGE UP (ref 8.4–10.5)
CAST: 0 /LPF — SIGNIFICANT CHANGE UP (ref 0–4)
CHLORIDE SERPL-SCNC: 102 MMOL/L — SIGNIFICANT CHANGE UP (ref 98–107)
CO2 SERPL-SCNC: 25 MMOL/L — SIGNIFICANT CHANGE UP (ref 22–31)
COLOR SPEC: YELLOW — SIGNIFICANT CHANGE UP
CREAT SERPL-MCNC: 0.53 MG/DL — SIGNIFICANT CHANGE UP (ref 0.5–1.3)
DIFF PNL FLD: NEGATIVE — SIGNIFICANT CHANGE UP
EGFR: 105 ML/MIN/1.73M2 — SIGNIFICANT CHANGE UP
EGFR: 105 ML/MIN/1.73M2 — SIGNIFICANT CHANGE UP
GLUCOSE BLDC GLUCOMTR-MCNC: 114 MG/DL — HIGH (ref 70–99)
GLUCOSE BLDC GLUCOMTR-MCNC: 125 MG/DL — HIGH (ref 70–99)
GLUCOSE BLDC GLUCOMTR-MCNC: 132 MG/DL — HIGH (ref 70–99)
GLUCOSE BLDC GLUCOMTR-MCNC: 96 MG/DL — SIGNIFICANT CHANGE UP (ref 70–99)
GLUCOSE SERPL-MCNC: 81 MG/DL — SIGNIFICANT CHANGE UP (ref 70–99)
GLUCOSE UR QL: NEGATIVE MG/DL — SIGNIFICANT CHANGE UP
HCT VFR BLD CALC: 35.6 % — SIGNIFICANT CHANGE UP (ref 34.5–45)
HGB BLD-MCNC: 11.8 G/DL — SIGNIFICANT CHANGE UP (ref 11.5–15.5)
KETONES UR QL: NEGATIVE MG/DL — SIGNIFICANT CHANGE UP
LEUKOCYTE ESTERASE UR-ACNC: NEGATIVE — SIGNIFICANT CHANGE UP
MCHC RBC-ENTMCNC: 32.2 PG — SIGNIFICANT CHANGE UP (ref 27–34)
MCHC RBC-ENTMCNC: 33.1 G/DL — SIGNIFICANT CHANGE UP (ref 32–36)
MCV RBC AUTO: 97.3 FL — SIGNIFICANT CHANGE UP (ref 80–100)
NITRITE UR-MCNC: NEGATIVE — SIGNIFICANT CHANGE UP
NRBC # BLD AUTO: 0 K/UL — SIGNIFICANT CHANGE UP (ref 0–0)
NRBC # FLD: 0 K/UL — SIGNIFICANT CHANGE UP (ref 0–0)
NRBC BLD AUTO-RTO: 0 /100 WBCS — SIGNIFICANT CHANGE UP (ref 0–0)
PH UR: 7.5 — SIGNIFICANT CHANGE UP (ref 5–8)
PLATELET # BLD AUTO: 233 K/UL — SIGNIFICANT CHANGE UP (ref 150–400)
POTASSIUM SERPL-MCNC: 4.2 MMOL/L — SIGNIFICANT CHANGE UP (ref 3.5–5.3)
POTASSIUM SERPL-SCNC: 4.2 MMOL/L — SIGNIFICANT CHANGE UP (ref 3.5–5.3)
PROT SERPL-MCNC: 6.4 G/DL — SIGNIFICANT CHANGE UP (ref 6–8.3)
PROT UR-MCNC: NEGATIVE MG/DL — SIGNIFICANT CHANGE UP
RBC # BLD: 3.66 M/UL — LOW (ref 3.8–5.2)
RBC # FLD: 13.2 % — SIGNIFICANT CHANGE UP (ref 10.3–14.5)
RBC CASTS # UR COMP ASSIST: 0 /HPF — SIGNIFICANT CHANGE UP (ref 0–4)
SODIUM SERPL-SCNC: 138 MMOL/L — SIGNIFICANT CHANGE UP (ref 135–145)
SP GR SPEC: 1.01 — SIGNIFICANT CHANGE UP (ref 1–1.03)
SQUAMOUS # UR AUTO: 0 /HPF — SIGNIFICANT CHANGE UP (ref 0–5)
UROBILINOGEN FLD QL: 0.2 MG/DL — SIGNIFICANT CHANGE UP (ref 0.2–1)
WBC # BLD: 4.26 K/UL — SIGNIFICANT CHANGE UP (ref 3.8–10.5)
WBC # FLD AUTO: 4.26 K/UL — SIGNIFICANT CHANGE UP (ref 3.8–10.5)
WBC UR QL: 0 /HPF — SIGNIFICANT CHANGE UP (ref 0–5)

## 2025-06-12 PROCEDURE — 99233 SBSQ HOSP IP/OBS HIGH 50: CPT

## 2025-06-12 PROCEDURE — 99232 SBSQ HOSP IP/OBS MODERATE 35: CPT | Mod: FS

## 2025-06-12 RX ORDER — GABAPENTIN 400 MG/1
300 CAPSULE ORAL AT BEDTIME
Refills: 0 | Status: DISCONTINUED | OUTPATIENT
Start: 2025-06-12 | End: 2025-06-24

## 2025-06-12 RX ADMIN — SERTRALINE 100 MILLIGRAM(S): 100 TABLET, FILM COATED ORAL at 08:07

## 2025-06-12 RX ADMIN — Medication 0.5 MILLIGRAM(S): at 12:26

## 2025-06-12 RX ADMIN — ATORVASTATIN CALCIUM 10 MILLIGRAM(S): 80 TABLET, FILM COATED ORAL at 20:33

## 2025-06-12 RX ADMIN — GABAPENTIN 300 MILLIGRAM(S): 400 CAPSULE ORAL at 20:33

## 2025-06-12 RX ADMIN — Medication 1 DOSE(S): at 10:33

## 2025-06-12 RX ADMIN — Medication 0.5 MILLIGRAM(S): at 08:07

## 2025-06-12 RX ADMIN — Medication 2 CAPSULE(S): at 16:45

## 2025-06-12 RX ADMIN — Medication 88 MICROGRAM(S): at 06:17

## 2025-06-12 RX ADMIN — Medication 2 CAPSULE(S): at 08:07

## 2025-06-12 RX ADMIN — METFORMIN HYDROCHLORIDE 500 MILLIGRAM(S): 500 TABLET ORAL at 08:06

## 2025-06-12 RX ADMIN — Medication 2 CAPSULE(S): at 12:25

## 2025-06-12 RX ADMIN — Medication 25 MILLIGRAM(S): at 20:33

## 2025-06-12 RX ADMIN — Medication 325 MILLIGRAM(S): at 20:33

## 2025-06-12 RX ADMIN — METOPROLOL SUCCINATE 25 MILLIGRAM(S): 50 TABLET, EXTENDED RELEASE ORAL at 08:07

## 2025-06-12 RX ADMIN — Medication 0.5 MILLIGRAM(S): at 20:33

## 2025-06-12 RX ADMIN — Medication 1 DOSE(S): at 20:34

## 2025-06-12 RX ADMIN — Medication 25 MILLIGRAM(S): at 22:26

## 2025-06-12 RX ADMIN — Medication 40 MILLIGRAM(S): at 08:07

## 2025-06-12 NOTE — BH INPATIENT PSYCHIATRY PROGRESS NOTE - NSBHATTESTCOMMENTATTENDFT_PSY_A_CORE
Care was discussed and reviewed in the interdisciplinary treatment team.  I, Liza Cope MD, have reviewed and verified the documentation.      Patient was seen and evaluated with the student present during the assessment. Patient reported that she is feeling better as she was able to sleep last night. She is less anxious and feels better about going home. She denies experiencing any panic attacks related to her going back home. Denies SI at the moment of the assessment and has been able to contract for safety.       Care was discussed and reviewed in the interdisciplinary treatment team.  I, Liza Cope MD, have reviewed and verified the documentation.      Patient was seen and evaluated with the student present during the assessment. Patient has continue to report feeling dizzy. EKG and UA  has been ordered. I did spoke with the hospitalist and we reviewed all the medications the patient has been taking, medical history and recent changes in treatment. We agreed with decreasing the Gabapentin to 300mg at bedtime. Hospitalist will evaluate of the patient need to continue the Metoprolol given that the patient's BP have been low.   For now we will continue the Zoloft at the same dose.

## 2025-06-12 NOTE — BH INPATIENT PSYCHIATRY PROGRESS NOTE - NSBHASSESSSUMMFT_PSY_ALL_CORE
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 Binh Valles), PMH of DM, HLD, asthma, hypothyroid, fibromyalgia, denies past violence/NSSI/SA/substance, BIBS for depression and SI.    On assessment, pt was calm, reports feeling happier and feels significantly improved. Regarding concern of dizziness, BP and orthostatics WNL, EKG shows NSR, pending CBC/CMP, hospitalist has been consulted. Plan to decrease Zoloft to 50mg and reassess.    Plan:   -Admit to [facility], 9.13  -No CO needed, routine observation, q15 checks   -Psychiatry:   gabapentin 600 milliGRAM(s) Oral at bedtime  LORazepam     Tablet 0.5 milliGRAM(s) Oral three times a day  sertraline 50 milliGRAM(s) Oral daily  -Medical:   Pt described feeling dizzy for 2 days (6/11-6/12), hospitalist consulted. BP/orthostatics WNL, EKG is NSR, F/u CBC/CMP.  Pt requested Tylenol PM, pt already has acetaminophen PRN order, added diphenhydramine 25 mg PO QHS PRN for insomnia    nitrofuratntoin 100mg BID for 5 days (6/05 - 6/10) for UTI.   atorvastatin 10 milliGRAM(s) Oral at bedtime  ferrous    sulfate 325 milliGRAM(s) Oral at bedtime  fluticasone propionate/ salmeterol 100-50 MICROgram(s) Diskus 1 Dose(s) Inhalation two times a day  glucagon  Injectable 1 milliGRAM(s) IntraMuscular once  insulin lispro (ADMELOG) corrective regimen sliding scale   SubCutaneous three times a day before meals  levothyroxine 88 MICROGram(s) Oral <User Schedule>  metFORMIN 500 milliGRAM(s) Oral daily  metoprolol succinate ER 25 milliGRAM(s) Oral daily  moxifloxacin 0.5% Solution 1 Drop(s) Right EYE three times a day  nortriptyline 25 milliGRAM(s) Oral at bedtime  pancrelipase  (CREON 36,000 Lipase Units) 1 Capsule(s) Oral three times a day with meals  pantoprazole    Tablet 40 milliGRAM(s) Oral before breakfast  prednisoLONE acetate 1% Suspension 1 Drop(s) Right EYE three times a day  acetaminophen     Tablet .. 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 Binh Valles), PMH of DM, HLD, asthma, hypothyroid, fibromyalgia, denies past violence/NSSI/SA/substance, BIBS for depression and SI.    On assessment, pt was calm, reports feeling happier and feels significantly improved. Regarding concern of dizziness, BP and orthostatics WNL, EKG shows NSR. CBC was significant for decreased RBC (3.88) with normal hemoglobin (11.8). HbA1C: 5.7% (6/5). CMP pending. UA WNL. Hospitalist has been consulted. Plan to decrease Zoloft to 50mg and reassess.    Plan:   -[facility] to [facility], 9.13  -No CO needed, routine observation, q15 checks   -Psychiatry:   gabapentin 600 milliGRAM(s) Oral at bedtime  LORazepam     Tablet 0.5 milliGRAM(s) Oral three times a day  sertraline 50 milliGRAM(s) Oral daily  -Medical:   Pt described feeling dizzy for 2 days (6/11-6/12), hospitalist consulted. BP/orthostatics WNL, EKG is NSR, F/u CBC/CMP.  Pt requested Tylenol PM, pt already has acetaminophen PRN order, added diphenhydramine 25 mg PO QHS PRN for insomnia    nitrofuratntoin 100mg BID for 5 days (6/05 - 6/10) for UTI.   atorvastatin 10 milliGRAM(s) Oral at bedtime  ferrous    sulfate 325 milliGRAM(s) Oral at bedtime  fluticasone propionate/ salmeterol 100-50 MICROgram(s) Diskus 1 Dose(s) Inhalation two times a day  glucagon  Injectable 1 milliGRAM(s) IntraMuscular once  insulin lispro (ADMELOG) corrective regimen sliding scale   SubCutaneous three times a day before meals  levothyroxine 88 MICROGram(s) Oral <User Schedule>  metFORMIN 500 milliGRAM(s) Oral daily  metoprolol succinate ER 25 milliGRAM(s) Oral daily  moxifloxacin 0.5% Solution 1 Drop(s) Right EYE three times a day  nortriptyline 25 milliGRAM(s) Oral at bedtime  pancrelipase  (CREON 36,000 Lipase Units) 1 Capsule(s) Oral three times a day with meals  pantoprazole    Tablet 40 milliGRAM(s) Oral before breakfast  prednisoLONE acetate 1% Suspension 1 Drop(s) Right EYE three times a day  acetaminophen     Tablet .. 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 Binh Valles), PMH of DM, HLD, asthma, hypothyroid, fibromyalgia, denies past violence/NSSI/SA/substance, BIBS for depression and SI.    On assessment, pt was calm, reports feeling happier and feels significantly improved. Regarding concern of dizziness, BP and orthostatics WNL, EKG shows NSR. CBC was significant for decreased RBC (3.88) with normal hemoglobin (11.8). CMP was not significant. HbA1C: 5.7% (6/5). UA WNL. Hospitalist has been consulted. Plan to decrease Gabapentin to 300mg and maintain Zoloft at 100mg. With f/u with hospitalist for further recommendations.    Plan:   -[facility] to [facility], 9.13  -No CO needed, routine observation, q15 checks   -Psychiatry:   gabapentin 600 milliGRAM(s) Oral at bedtime  LORazepam     Tablet 0.5 milliGRAM(s) Oral three times a day  sertraline 50 milliGRAM(s) Oral daily  -Medical:   Pt described feeling dizzy for 2 days (6/11-6/12), hospitalist consulted. BP/orthostatics WNL, EKG is NSR, F/u CBC/CMP.  Pt requested Tylenol PM, pt already has acetaminophen PRN order, added diphenhydramine 25 mg PO QHS PRN for insomnia    nitrofuratntoin 100mg BID for 5 days (6/05 - 6/10) for UTI.   atorvastatin 10 milliGRAM(s) Oral at bedtime  ferrous    sulfate 325 milliGRAM(s) Oral at bedtime  fluticasone propionate/ salmeterol 100-50 MICROgram(s) Diskus 1 Dose(s) Inhalation two times a day  glucagon  Injectable 1 milliGRAM(s) IntraMuscular once  insulin lispro (ADMELOG) corrective regimen sliding scale   SubCutaneous three times a day before meals  levothyroxine 88 MICROGram(s) Oral <User Schedule>  metFORMIN 500 milliGRAM(s) Oral daily  metoprolol succinate ER 25 milliGRAM(s) Oral daily  moxifloxacin 0.5% Solution 1 Drop(s) Right EYE three times a day  nortriptyline 25 milliGRAM(s) Oral at bedtime  pancrelipase  (CREON 36,000 Lipase Units) 1 Capsule(s) Oral three times a day with meals  pantoprazole    Tablet 40 milliGRAM(s) Oral before breakfast  prednisoLONE acetate 1% Suspension 1 Drop(s) Right EYE three times a day  acetaminophen     Tablet .. 650 milliGRAM(s) Oral every 6 hours PRN Mild Pain (1 - 3), Moderate Pain (4 - 6), Severe Pain (7 - 10)  albuterol    90 MICROgram(s) HFA Inhaler 2 Puff(s) Inhalation every 6 hours PRN Asthma  dextrose Oral Gel 15 Gram(s) Oral once PRN Blood Glucose LESS THAN 70 milliGRAM(s)/deciliter  EPINEPHrine     1 mG/mL Injectable 0.3 milliGRAM(s) IntraMuscular once PRN ANAPHYLAXIS  ondansetron    Tablet 4 milliGRAM(s) Oral every 6 hours PRN Nausea/vomiting  -Group and milieu therapy  -Dispo as per SW   Pt is a 61 yo woman, , helps take care of her father, on disability, PPH of MDD and panic attacks, in outpt tx w a psychiatrist (Dr. Binh Valles), PMH of DM, HLD, asthma, hypothyroid, fibromyalgia, denies past violence/NSSI/SA/substance, BIBS for depression and SI.    On assessment, pt was calm, reports feeling happier and feels significantly improved. Regarding concern of dizziness, BP and orthostatics WNL, EKG shows NSR. CBC was significant for decreased RBC (3.88) with normal hemoglobin (11.8). CMP was not significant. HbA1C: 5.7% (6/5). UA WNL. Hospitalist has been consulted. Plan to decrease Gabapentin to 300mg and maintain Zoloft at 100mg.     Plan:   -[facility] to [facility], 9.13  -No CO needed, routine observation, q15 checks   -Psychiatry:   gabapentin 600 milliGRAM(s) Oral at bedtime  LORazepam     Tablet 0.5 milliGRAM(s) Oral three times a day  sertraline 50 milliGRAM(s) Oral daily  -Medical:   Pt described feeling dizzy for 2 days (6/11-6/12), hospitalist consulted. BP/orthostatics WNL, EKG is NSR, F/u CBC/CMP. Per hospitalist, most likely multifactorial due to polypharmacy, poor PO intake, and lower BP. Recommendations:  - recommend head CT  - plan to hold metformin and ctm to blood surgar and reevaluate  - plan to hold metoprolol and ctm blood presures and reevaluate  - lower gabapentin to 300mg  - recommend titrate off ativan if possible  - trial meclizine if QTc is WNL.   Pt requested Tylenol PM, pt already has acetaminophen PRN order, added diphenhydramine 25 mg PO QHS PRN for insomnia    nitrofuratntoin 100mg BID for 5 days (6/05 - 6/10) for UTI.   atorvastatin 10 milliGRAM(s) Oral at bedtime  ferrous    sulfate 325 milliGRAM(s) Oral at bedtime  fluticasone propionate/ salmeterol 100-50 MICROgram(s) Diskus 1 Dose(s) Inhalation two times a day  glucagon  Injectable 1 milliGRAM(s) IntraMuscular once  insulin lispro (ADMELOG) corrective regimen sliding scale   SubCutaneous three times a day before meals  levothyroxine 88 MICROGram(s) Oral <User Schedule>  metFORMIN 500 milliGRAM(s) Oral daily  metoprolol succinate ER 25 milliGRAM(s) Oral daily  moxifloxacin 0.5% Solution 1 Drop(s) Right EYE three times a day  nortriptyline 25 milliGRAM(s) Oral at bedtime  pancrelipase  (CREON 36,000 Lipase Units) 1 Capsule(s) Oral three times a day with meals  pantoprazole    Tablet 40 milliGRAM(s) Oral before breakfast  prednisoLONE acetate 1% Suspension 1 Drop(s) Right EYE three times a day  acetaminophen     Tablet .. 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 Binh Valles), PMH of DM, HLD, asthma, hypothyroid, fibromyalgia, denies past violence/NSSI/SA/substance, BIBS for depression and SI.    On assessment, pt was calm, reports feeling happier and feels significantly improved. Regarding concern of dizziness, BP and orthostatics WNL, EKG shows NSR with QTc 348. CBC was significant for decreased RBC (3.88) with normal hemoglobin (11.8). CMP was not significant. HbA1C: 5.7% (6/5). UA WNL. Hospitalist has been consulted. Plan to decrease Gabapentin to 300mg and maintain Zoloft at 100mg.     Plan:   -[facility] to [facility], 9.13  -No CO needed, routine observation, q15 checks   -Psychiatry:   gabapentin 600 milliGRAM(s) Oral at bedtime  LORazepam     Tablet 0.5 milliGRAM(s) Oral three times a day  sertraline 50 milliGRAM(s) Oral daily  -Medical:   Pt described feeling dizzy for 2 days (6/11-6/12), hospitalist consulted. BP/orthostatics WNL, EKG is NSR, F/u CBC/CMP. Per hospitalist, most likely multifactorial due to polypharmacy, poor PO intake, and lower BP. Recommendations:  - recommend head CT  - plan to hold metformin and ctm to blood surgar and reevaluate  - plan to hold metoprolol and ctm blood presures and reevaluate  - lower gabapentin to 300mg  - recommend titrate off ativan if possible  - trial meclizine if QTc is WNL (EKG from 6/12 showed QTc 348)  Pt requested Tylenol PM, pt already has acetaminophen PRN order, added diphenhydramine 25 mg PO QHS PRN for insomnia    nitrofuratntoin 100mg BID for 5 days (6/05 - 6/10) for UTI.   atorvastatin 10 milliGRAM(s) Oral at bedtime  ferrous    sulfate 325 milliGRAM(s) Oral at bedtime  fluticasone propionate/ salmeterol 100-50 MICROgram(s) Diskus 1 Dose(s) Inhalation two times a day  glucagon  Injectable 1 milliGRAM(s) IntraMuscular once  insulin lispro (ADMELOG) corrective regimen sliding scale   SubCutaneous three times a day before meals  levothyroxine 88 MICROGram(s) Oral <User Schedule>  metFORMIN 500 milliGRAM(s) Oral daily  metoprolol succinate ER 25 milliGRAM(s) Oral daily  moxifloxacin 0.5% Solution 1 Drop(s) Right EYE three times a day  nortriptyline 25 milliGRAM(s) Oral at bedtime  pancrelipase  (CREON 36,000 Lipase Units) 1 Capsule(s) Oral three times a day with meals  pantoprazole    Tablet 40 milliGRAM(s) Oral before breakfast  prednisoLONE acetate 1% Suspension 1 Drop(s) Right EYE three times a day  acetaminophen     Tablet .. 650 milliGRAM(s) Oral every 6 hours PRN Mild Pain (1 - 3), Moderate Pain (4 - 6), Severe Pain (7 - 10)  albuterol    90 MICROgram(s) HFA Inhaler 2 Puff(s) Inhalation every 6 hours PRN Asthma  dextrose Oral Gel 15 Gram(s) Oral once PRN Blood Glucose LESS THAN 70 milliGRAM(s)/deciliter  EPINEPHrine     1 mG/mL Injectable 0.3 milliGRAM(s) IntraMuscular once PRN ANAPHYLAXIS  ondansetron    Tablet 4 milliGRAM(s) Oral every 6 hours PRN Nausea/vomiting  -Group and milieu therapy  -Dispo as per SW

## 2025-06-12 NOTE — BH INPATIENT PSYCHIATRY PROGRESS NOTE - NSBHFUPINTERVALHXFT_PSY_A_CORE
Chart was reviewed and discussed with team, vitals were reviewed. Pt was seen and assessed with Dr. Parekh. On presentation, pt was alert, sitting in chair, cooperative to being interviewed. This morning, Zoloft was increased to 100mg. Pt described feeling dizzy throughout the day yesterday, and is continuing to feel dizzy this morning. Has not had any falls on unit. Has a history of vertigo but says that this "feels different." BP is WNL (113/64). No orthostatic hypotension. EKG showed NSR. CBC and CMP were drawn. Plan to decrease Zoloft dose to 50mg and hospitalist was consulted to further evaluate.  Pt described mood as "good," had pleasant affect, says she continues to feel better today. Normal sleep and appetite. Otherwise, pt attending group and finds it helpful. Discussed stressors that led to initial presentation, and counseled on the importance of regular follow up treatment to help manage these stressors once she returns home. Regarding safe discharge plan, pt is unsure who she wants to come pick her up or if she feels comfortable going home with . Plan to revisit discussion tomorrow. Chart was reviewed and discussed with team, vitals were reviewed. Pt was seen and assessed with Dr. Parekh. On presentation, pt was alert, sitting in chair, cooperative to being interviewed. This morning, Zoloft was increased to 100mg. Pt described feeling dizzy throughout the day yesterday, and is continuing to feel dizzy this morning. Has not had any falls on unit. Has a history of vertigo but says that this "feels different." BP is WNL (113/64). No orthostatic hypotension. EKG showed NSR. CBC and CMP were ordered. Plan to decrease Zoloft dose to 50mg and hospitalist was consulted to further evaluate.  Pt described mood as "good," had pleasant affect, says she continues to feel better today. Normal sleep and appetite. Otherwise, pt attending group and finds it helpful. Discussed stressors that led to initial presentation, and counseled on the importance of regular follow up treatment to help manage these stressors once she returns home. Regarding safe discharge plan, pt is unsure who she wants to come pick her up or if she feels comfortable going home with . Plan to revisit discussion tomorrow. Chart was reviewed and discussed with team, vitals were reviewed. Pt was seen and assessed with Dr. Parekh. On presentation, pt was alert, sitting in chair, cooperative to being interviewed. This morning, Zoloft was increased to 100mg. Pt described feeling dizzy throughout the day yesterday, and is continuing to feel dizzy this morning. Has not had any falls on unit. Has a history of vertigo but says that this "feels different." Discussed having hospitalist consulted to further evaluate. Pt agreed.  Pt described mood as "good," had pleasant affect, says she continues to feel better today. Normal sleep and appetite. Pt attending group and finds it helpful. Discussed stressors that led to initial presentation, and counseled on the importance of regular follow up treatment to help manage these stressors once she returns home. Regarding safe discharge plan, pt is unsure who she wants to come pick her up or if she feels comfortable going home with . Plan to revisit discussion tomorrow.

## 2025-06-12 NOTE — BH INPATIENT PSYCHIATRY PROGRESS NOTE - NSBHMETABOLIC_PSY_ALL_CORE_FT
BMI: BMI (kg/m2): 21.6 (06-04-25 @ 11:20)  HbA1c: A1C with Estimated Average Glucose Result: 5.7 % (06-05-25 @ 08:00)    Glucose: POCT Blood Glucose.: 96 mg/dL (06-12-25 @ 12:07)    BP: --Vital Signs Last 24 Hrs  T(C): 36.4 (06-12-25 @ 07:44), Max: 36.4 (06-12-25 @ 07:44)  T(F): 97.6 (06-12-25 @ 07:44), Max: 97.6 (06-12-25 @ 07:44)  HR: --  BP: --  BP(mean): --  RR: 18 (06-12-25 @ 07:44) (18 - 18)  SpO2: --    Orthostatic VS  06-12-25 @ 07:44  Lying BP: --/-- HR: --  Sitting BP: 110/73 HR: 71  Standing BP: 113/64 HR: 72  Site: --  Mode: --  Orthostatic VS  06-11-25 @ 07:22  Lying BP: --/-- HR: --  Sitting BP: 118/60 HR: 79  Standing BP: 106/60 HR: 83  Site: --  Mode: --    Lipid Panel: Date/Time: 06-05-25 @ 08:00  Cholesterol, Serum: 183  LDL Cholesterol Calculated: 97  HDL Cholesterol, Serum: 66  Total Cholesterol/HDL Ration Measurement: --  Triglycerides, Serum: 112

## 2025-06-12 NOTE — PROGRESS NOTE ADULT - SUBJECTIVE AND OBJECTIVE BOX
Hospitalist Progress Note  Deisi Ness MD Pager # [phone]    OVERNIGHT EVENTS: RENU    SUBJECTIVE / INTERVAL HPI: Patient seen and examined at bedside. Patient reports constant dizziness and room spinning for two days. She denies nausea/vomiting. Nothing makes the dizziness better or worse. She reports that she has had vertigo in the past that was triggered by head movement but this is different because it is constant. She reports difficulty walking because she feels off balance and like she could fall. She denies weakness but reports numbness/tingling feeling in the right hand and then the left hand this morning. No HA. No chest pain or SOB. Denies new blurry vision other than what she has been experiencing due to a prior eye surgery.     VITAL SIGNS:  Vital Signs Last 24 Hrs  T(C): 36.4 (2025 07:44), Max: 36.4 (2025 07:44)  T(F): 97.6 (2025 07:44), Max: 97.6 (2025 07:44)  HR: --  BP: --  BP(mean): --  RR: 18 (2025 07:44) (18 - 18)  SpO2: --        PHYSICAL EXAM:    General: Comfortable  HEENT: NC/AT; PERRL, anicteric sclera; MMM  Neck: supple  Cardiovascular: +S1/S2; RRR  Respiratory: CTA B/L; no W/R/R  Gastrointestinal: soft, NT/ND; +BSx4  Extremities: WWP; no edema, clubbing or cyanosis  Vascular: 2+ radial, DP/PT pulses B/L  Neurological: AAOx3; no focal deficits - normal gait. CN II-XII intact. No nystagmus observed.     MEDICATIONS:  MEDICATIONS  (STANDING):  atorvastatin 10 milliGRAM(s) Oral at bedtime  ferrous    sulfate 325 milliGRAM(s) Oral at bedtime  fluticasone propionate/ salmeterol 100-50 MICROgram(s) Diskus 1 Dose(s) Inhalation two times a day  gabapentin 300 milliGRAM(s) Oral at bedtime  glucagon  Injectable 1 milliGRAM(s) IntraMuscular once  insulin lispro (ADMELOG) corrective regimen sliding scale   SubCutaneous three times a day before meals  levothyroxine 88 MICROGram(s) Oral <User Schedule>  LORazepam     Tablet 0.5 milliGRAM(s) Oral three times a day  metFORMIN 500 milliGRAM(s) Oral daily  metoprolol succinate ER 25 milliGRAM(s) Oral daily  nortriptyline 25 milliGRAM(s) Oral at bedtime  pancrelipase  (CREON 36,000 Lipase Units) 2 Capsule(s) Oral four times a day with meals  pantoprazole    Tablet 40 milliGRAM(s) Oral before breakfast  sertraline 100 milliGRAM(s) Oral daily    MEDICATIONS  (PRN):  acetaminophen     Tablet .. 650 milliGRAM(s) Oral every 6 hours PRN Mild Pain (1 - 3), Moderate Pain (4 - 6), Severe Pain (7 - 10)  albuterol    90 MICROgram(s) HFA Inhaler 2 Puff(s) Inhalation every 6 hours PRN Asthma  dextrose Oral Gel 15 Gram(s) Oral once PRN Blood Glucose LESS THAN 70 milliGRAM(s)/deciliter  diphenhydrAMINE 25 milliGRAM(s) Oral at bedtime PRN insomnia  EPINEPHrine     1 mG/mL Injectable 0.3 milliGRAM(s) IntraMuscular once PRN ANAPHYLAXIS  lidocaine   4% Patch 1 Patch Transdermal daily PRN Low back pain  LORazepam     Tablet 1 milliGRAM(s) Oral every 6 hours PRN Agitation 2/2 mood  LORazepam   Injectable 1 milliGRAM(s) IntraMuscular once PRN Agitation 2/2 mood  ondansetron    Tablet 4 milliGRAM(s) Oral every 6 hours PRN Nausea/vomiting      ALLERGIES:  Allergies    Soy (Short breath; Anaphylaxis)  Ultram (Unknown)  lactose (Flatulence)  apple (Unknown)  Pineapple (Unknown)  peanuts (Unknown)  Peaches (Unknown)  Cherries (Eye Irritation; Swelling; Pruritus)  Corn (Unknown)  Milk (Unknown)  Originally Entered as [Unknown] reaction to [CHERRIES] (Unknown)  Kiwi (Unknown)    Intolerances        LABS:                        11.8   4.26  )-----------( 233      ( 2025 11:45 )             35.6     06-12    138  |  102  |  8   ----------------------------<  81  4.2   |  25  |  0.53    Ca    9.5      2025 11:45    TPro  6.4  /  Alb  3.9  /  TBili  <0.2  /  DBili  x   /  AST  13  /  ALT  13  /  AlkPhos  114  06-12      Urinalysis Basic - ( 2025 12:06 )    Color: Yellow / Appearance: Clear / S.008 / pH: x  Gluc: x / Ketone: x  / Bili: Negative / Urobili: 0.2 mg/dL   Blood: x / Protein: Negative mg/dL / Nitrite: Negative   Leuk Esterase: Negative / RBC: 0 /HPF / WBC 0 /HPF   Sq Epi: x / Non Sq Epi: 0 /HPF / Bacteria: Negative /HPF      CAPILLARY BLOOD GLUCOSE      POCT Blood Glucose.: 96 mg/dL (2025 12:07)      RADIOLOGY & ADDITIONAL TESTS: Reviewed.    ASSESSMENT:    PLAN:

## 2025-06-12 NOTE — BH INPATIENT PSYCHIATRY PROGRESS NOTE - CURRENT MEDICATION
MEDICATIONS  (STANDING):  atorvastatin 10 milliGRAM(s) Oral at bedtime  ferrous    sulfate 325 milliGRAM(s) Oral at bedtime  fluticasone propionate/ salmeterol 100-50 MICROgram(s) Diskus 1 Dose(s) Inhalation two times a day  gabapentin 600 milliGRAM(s) Oral at bedtime  glucagon  Injectable 1 milliGRAM(s) IntraMuscular once  insulin lispro (ADMELOG) corrective regimen sliding scale   SubCutaneous three times a day before meals  levothyroxine 88 MICROGram(s) Oral <User Schedule>  LORazepam     Tablet 0.5 milliGRAM(s) Oral three times a day  metFORMIN 500 milliGRAM(s) Oral daily  metoprolol succinate ER 25 milliGRAM(s) Oral daily  nortriptyline 25 milliGRAM(s) Oral at bedtime  pancrelipase  (CREON 36,000 Lipase Units) 2 Capsule(s) Oral four times a day with meals  pantoprazole    Tablet 40 milliGRAM(s) Oral before breakfast  sertraline 100 milliGRAM(s) Oral daily    MEDICATIONS  (PRN):  acetaminophen     Tablet .. 650 milliGRAM(s) Oral every 6 hours PRN Mild Pain (1 - 3), Moderate Pain (4 - 6), Severe Pain (7 - 10)  albuterol    90 MICROgram(s) HFA Inhaler 2 Puff(s) Inhalation every 6 hours PRN Asthma  dextrose Oral Gel 15 Gram(s) Oral once PRN Blood Glucose LESS THAN 70 milliGRAM(s)/deciliter  diphenhydrAMINE 25 milliGRAM(s) Oral at bedtime PRN insomnia  EPINEPHrine     1 mG/mL Injectable 0.3 milliGRAM(s) IntraMuscular once PRN ANAPHYLAXIS  lidocaine   4% Patch 1 Patch Transdermal daily PRN Low back pain  LORazepam     Tablet 1 milliGRAM(s) Oral every 6 hours PRN Agitation 2/2 mood  LORazepam   Injectable 1 milliGRAM(s) IntraMuscular once PRN Agitation 2/2 mood  ondansetron    Tablet 4 milliGRAM(s) Oral every 6 hours PRN Nausea/vomiting   MEDICATIONS  (STANDING):  atorvastatin 10 milliGRAM(s) Oral at bedtime  ferrous    sulfate 325 milliGRAM(s) Oral at bedtime  fluticasone propionate/ salmeterol 100-50 MICROgram(s) Diskus 1 Dose(s) Inhalation two times a day  gabapentin 300 milliGRAM(s) Oral at bedtime  glucagon  Injectable 1 milliGRAM(s) IntraMuscular once  insulin lispro (ADMELOG) corrective regimen sliding scale   SubCutaneous three times a day before meals  levothyroxine 88 MICROGram(s) Oral <User Schedule>  LORazepam     Tablet 0.5 milliGRAM(s) Oral three times a day  metFORMIN 500 milliGRAM(s) Oral daily  metoprolol succinate ER 25 milliGRAM(s) Oral daily  nortriptyline 25 milliGRAM(s) Oral at bedtime  pancrelipase  (CREON 36,000 Lipase Units) 2 Capsule(s) Oral four times a day with meals  pantoprazole    Tablet 40 milliGRAM(s) Oral before breakfast  sertraline 100 milliGRAM(s) Oral daily    MEDICATIONS  (PRN):  acetaminophen     Tablet .. 650 milliGRAM(s) Oral every 6 hours PRN Mild Pain (1 - 3), Moderate Pain (4 - 6), Severe Pain (7 - 10)  albuterol    90 MICROgram(s) HFA Inhaler 2 Puff(s) Inhalation every 6 hours PRN Asthma  dextrose Oral Gel 15 Gram(s) Oral once PRN Blood Glucose LESS THAN 70 milliGRAM(s)/deciliter  diphenhydrAMINE 25 milliGRAM(s) Oral at bedtime PRN insomnia  EPINEPHrine     1 mG/mL Injectable 0.3 milliGRAM(s) IntraMuscular once PRN ANAPHYLAXIS  lidocaine   4% Patch 1 Patch Transdermal daily PRN Low back pain  LORazepam     Tablet 1 milliGRAM(s) Oral every 6 hours PRN Agitation 2/2 mood  LORazepam   Injectable 1 milliGRAM(s) IntraMuscular once PRN Agitation 2/2 mood  ondansetron    Tablet 4 milliGRAM(s) Oral every 6 hours PRN Nausea/vomiting

## 2025-06-13 LAB
GLUCOSE BLDC GLUCOMTR-MCNC: 102 MG/DL — HIGH (ref 70–99)
GLUCOSE BLDC GLUCOMTR-MCNC: 112 MG/DL — HIGH (ref 70–99)
GLUCOSE BLDC GLUCOMTR-MCNC: 132 MG/DL — HIGH (ref 70–99)
GLUCOSE BLDC GLUCOMTR-MCNC: 90 MG/DL — SIGNIFICANT CHANGE UP (ref 70–99)

## 2025-06-13 PROCEDURE — 99232 SBSQ HOSP IP/OBS MODERATE 35: CPT | Mod: FS

## 2025-06-13 RX ORDER — SENNA 187 MG
2 TABLET ORAL AT BEDTIME
Refills: 0 | Status: DISCONTINUED | OUTPATIENT
Start: 2025-06-13 | End: 2025-06-24

## 2025-06-13 RX ORDER — POLYETHYLENE GLYCOL 3350 17 G/17G
17 POWDER, FOR SOLUTION ORAL DAILY
Refills: 0 | Status: DISCONTINUED | OUTPATIENT
Start: 2025-06-13 | End: 2025-06-24

## 2025-06-13 RX ORDER — MELATONIN 5 MG
3 TABLET ORAL AT BEDTIME
Refills: 0 | Status: DISCONTINUED | OUTPATIENT
Start: 2025-06-13 | End: 2025-06-16

## 2025-06-13 RX ADMIN — Medication 88 MICROGRAM(S): at 05:24

## 2025-06-13 RX ADMIN — Medication 25 MILLIGRAM(S): at 22:45

## 2025-06-13 RX ADMIN — Medication 2 CAPSULE(S): at 16:41

## 2025-06-13 RX ADMIN — Medication 3 MILLIGRAM(S): at 20:41

## 2025-06-13 RX ADMIN — Medication 40 MILLIGRAM(S): at 08:17

## 2025-06-13 RX ADMIN — Medication 0.5 MILLIGRAM(S): at 12:12

## 2025-06-13 RX ADMIN — Medication 1 DOSE(S): at 22:07

## 2025-06-13 RX ADMIN — Medication 0.5 MILLIGRAM(S): at 08:16

## 2025-06-13 RX ADMIN — Medication 325 MILLIGRAM(S): at 20:41

## 2025-06-13 RX ADMIN — Medication 2 TABLET(S): at 20:42

## 2025-06-13 RX ADMIN — SERTRALINE 100 MILLIGRAM(S): 100 TABLET, FILM COATED ORAL at 08:16

## 2025-06-13 RX ADMIN — ATORVASTATIN CALCIUM 10 MILLIGRAM(S): 80 TABLET, FILM COATED ORAL at 20:42

## 2025-06-13 RX ADMIN — Medication 2 CAPSULE(S): at 08:16

## 2025-06-13 RX ADMIN — Medication 25 MILLIGRAM(S): at 20:42

## 2025-06-13 RX ADMIN — Medication 2 CAPSULE(S): at 12:30

## 2025-06-13 RX ADMIN — Medication 0.5 MILLIGRAM(S): at 20:41

## 2025-06-13 RX ADMIN — METFORMIN HYDROCHLORIDE 500 MILLIGRAM(S): 500 TABLET ORAL at 08:54

## 2025-06-13 RX ADMIN — GABAPENTIN 300 MILLIGRAM(S): 400 CAPSULE ORAL at 20:41

## 2025-06-13 NOTE — BH INPATIENT PSYCHIATRY PROGRESS NOTE - NSBHATTESTCOMMENTATTENDFT_PSY_A_CORE
Care was discussed and reviewed in the interdisciplinary treatment team.  I, Liza Cope MD, have reviewed and verified the documentation.      Patient was seen and evaluated with the student present during the assessment. Patient has continue to report feeling dizzy. EKG and UA  has been ordered. I did spoke with the hospitalist and we reviewed all the medications the patient has been taking, medical history and recent changes in treatment. We agreed with decreasing the Gabapentin to 300mg at bedtime. Hospitalist will evaluate of the patient need to continue the Metoprolol given that the patient's BP have been low.   For now we will continue the Zoloft at the same dose.       Care was discussed and reviewed in the interdisciplinary treatment team.  I, Liza Cope MD, have reviewed and verified the documentation.

## 2025-06-13 NOTE — BH INPATIENT PSYCHIATRY PROGRESS NOTE - PRN MEDS
MEDICATIONS  (PRN):  acetaminophen     Tablet .. 650 milliGRAM(s) Oral every 6 hours PRN Mild Pain (1 - 3), Moderate Pain (4 - 6), Severe Pain (7 - 10)  albuterol    90 MICROgram(s) HFA Inhaler 2 Puff(s) Inhalation every 6 hours PRN Asthma  dextrose Oral Gel 15 Gram(s) Oral once PRN Blood Glucose LESS THAN 70 milliGRAM(s)/deciliter  diphenhydrAMINE 25 milliGRAM(s) Oral at bedtime PRN insomnia  EPINEPHrine     1 mG/mL Injectable 0.3 milliGRAM(s) IntraMuscular once PRN ANAPHYLAXIS  lidocaine   4% Patch 1 Patch Transdermal daily PRN Low back pain  LORazepam     Tablet 1 milliGRAM(s) Oral every 6 hours PRN Agitation 2/2 mood  LORazepam   Injectable 1 milliGRAM(s) IntraMuscular once PRN Agitation 2/2 mood  ondansetron    Tablet 4 milliGRAM(s) Oral every 6 hours PRN Nausea/vomiting   MEDICATIONS  (PRN):  acetaminophen     Tablet .. 650 milliGRAM(s) Oral every 6 hours PRN Mild Pain (1 - 3), Moderate Pain (4 - 6), Severe Pain (7 - 10)  albuterol    90 MICROgram(s) HFA Inhaler 2 Puff(s) Inhalation every 6 hours PRN Asthma  dextrose Oral Gel 15 Gram(s) Oral once PRN Blood Glucose LESS THAN 70 milliGRAM(s)/deciliter  diphenhydrAMINE 25 milliGRAM(s) Oral at bedtime PRN insomnia  EPINEPHrine     1 mG/mL Injectable 0.3 milliGRAM(s) IntraMuscular once PRN ANAPHYLAXIS  lidocaine   4% Patch 1 Patch Transdermal daily PRN Low back pain  LORazepam     Tablet 1 milliGRAM(s) Oral every 6 hours PRN Agitation 2/2 mood  LORazepam   Injectable 1 milliGRAM(s) IntraMuscular once PRN Agitation 2/2 mood  ondansetron    Tablet 4 milliGRAM(s) Oral every 6 hours PRN Nausea/vomiting  polyethylene glycol 3350 17 Gram(s) Oral daily PRN Constipation

## 2025-06-13 NOTE — BH INPATIENT PSYCHIATRY PROGRESS NOTE - NSBHCHARTREVIEWVS_PSY_A_CORE FT
Vital Signs Last 24 Hrs  T(C): 37.1 (06-13-25 @ 08:14), Max: 37.1 (06-13-25 @ 08:14)  T(F): 98.8 (06-13-25 @ 08:14), Max: 98.8 (06-13-25 @ 08:14)  HR: --  BP: --  BP(mean): --  RR: 17 (06-13-25 @ 08:14) (17 - 17)  SpO2: --    Orthostatic VS  06-13-25 @ 08:14  Lying BP: --/-- HR: --  Sitting BP: 123/64 HR: 76  Standing BP: 119/67 HR: 80  Site: --  Mode: --  Orthostatic VS  06-12-25 @ 07:44  Lying BP: --/-- HR: --  Sitting BP: 110/73 HR: 71  Standing BP: 113/64 HR: 72  Site: --  Mode: --

## 2025-06-13 NOTE — BH INPATIENT PSYCHIATRY PROGRESS NOTE - NSBHASSESSSUMMFT_PSY_ALL_CORE
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 Binh Valles), PMH of DM, HLD, asthma, hypothyroid, fibromyalgia, denies past violence/NSSI/SA/substance, BIBS for depression and SI.    On assessment, pt was calm, reports feeling happier and feels significantly improved. Regarding concern of dizziness, given hospitalist consult, currently holding metoprolol, decreased Gabapentin to 300mg and maintaining Zoloft at 100mg. Pt reports feeling improved, no longer dizzy. Plan to continue to monitor on this medication regimen and continue to discuss safe discharge planning.    Plan:   -Admit to [facility], 9[facility]13  -No CO needed, routine observation, q15 checks   -Psychiatry:   gabapentin 300 milliGRAM(s) Oral at bedtime  LORazepam     Tablet 0.5 milliGRAM(s) Oral three times a day  sertraline 50 milliGRAM(s) Oral daily  -Medical:   Pt described feeling dizzy for 2 days (6/11-6/12), hospitalist consulted. BP/orthostatics WNL, EKG is NSR, F/u CBC/CMP. Per hospitalist, most likely multifactorial due to polypharmacy, poor PO intake, and lower BP. Recommendations:  - recommend head CT  - plan to hold metformin and ctm to blood surgar and reevaluate  - plan to hold metoprolol and ctm blood presures and reevaluate  - lower gabapentin to 300mg  - recommend titrate off ativan if possible  - trial meclizine if QTc is WNL (EKG from 6/12 showed QTc 348)  Pt requested Tylenol PM, pt already has acetaminophen PRN order, added diphenhydramine 25 mg PO QHS PRN for insomnia    nitrofuratntoin 100mg BID for 5 days (6/05 - 6/10) for UTI.   atorvastatin 10 milliGRAM(s) Oral at bedtime  ferrous    sulfate 325 milliGRAM(s) Oral at bedtime  fluticasone propionate/ salmeterol 100-50 MICROgram(s) Diskus 1 Dose(s) Inhalation two times a day  glucagon  Injectable 1 milliGRAM(s) IntraMuscular once  insulin lispro (ADMELOG) corrective regimen sliding scale   SubCutaneous three times a day before meals  levothyroxine 88 MICROGram(s) Oral <User Schedule>  metFORMIN 500 milliGRAM(s) Oral daily  metoprolol succinate ER 25 milliGRAM(s) Oral daily  moxifloxacin 0.5% Solution 1 Drop(s) Right EYE three times a day  nortriptyline 25 milliGRAM(s) Oral at bedtime  pancrelipase  (CREON 36,000 Lipase Units) 1 Capsule(s) Oral three times a day with meals  pantoprazole    Tablet 40 milliGRAM(s) Oral before breakfast  prednisoLONE acetate 1% Suspension 1 Drop(s) Right EYE three times a day  acetaminophen     Tablet .. 650 milliGRAM(s) Oral every 6 hours PRN Mild Pain (1 - 3), Moderate Pain (4 - 6), Severe Pain (7 - 10)  albuterol    90 MICROgram(s) HFA Inhaler 2 Puff(s) Inhalation every 6 hours PRN Asthma  dextrose Oral Gel 15 Gram(s) Oral once PRN Blood Glucose LESS THAN 70 milliGRAM(s)/deciliter  EPINEPHrine     1 mG/mL Injectable 0.3 milliGRAM(s) IntraMuscular once PRN ANAPHYLAXIS  ondansetron    Tablet 4 milliGRAM(s) Oral every 6 hours PRN Nausea/vomiting  -Group and milieu therapy  -Dispo as per SW

## 2025-06-13 NOTE — BH INPATIENT PSYCHIATRY PROGRESS NOTE - NSBHMETABOLIC_PSY_ALL_CORE_FT
BMI: BMI (kg/m2): 21.6 (06-04-25 @ 11:20)  HbA1c: A1C with Estimated Average Glucose Result: 5.7 % (06-05-25 @ 08:00)    Glucose: POCT Blood Glucose.: 112 mg/dL (06-13-25 @ 16:19)    BP: --Vital Signs Last 24 Hrs  T(C): 37.1 (06-13-25 @ 08:14), Max: 37.1 (06-13-25 @ 08:14)  T(F): 98.8 (06-13-25 @ 08:14), Max: 98.8 (06-13-25 @ 08:14)  HR: --  BP: --  BP(mean): --  RR: 17 (06-13-25 @ 08:14) (17 - 17)  SpO2: --    Orthostatic VS  06-13-25 @ 08:14  Lying BP: --/-- HR: --  Sitting BP: 123/64 HR: 76  Standing BP: 119/67 HR: 80  Site: --  Mode: --  Orthostatic VS  06-12-25 @ 07:44  Lying BP: --/-- HR: --  Sitting BP: 110/73 HR: 71  Standing BP: 113/64 HR: 72  Site: --  Mode: --    Lipid Panel: Date/Time: 06-05-25 @ 08:00  Cholesterol, Serum: 183  LDL Cholesterol Calculated: 97  HDL Cholesterol, Serum: 66  Total Cholesterol/HDL Ration Measurement: --  Triglycerides, Serum: 112

## 2025-06-13 NOTE — BH INPATIENT PSYCHIATRY PROGRESS NOTE - CURRENT MEDICATION
MEDICATIONS  (STANDING):  atorvastatin 10 milliGRAM(s) Oral at bedtime  ferrous    sulfate 325 milliGRAM(s) Oral at bedtime  fluticasone propionate/ salmeterol 100-50 MICROgram(s) Diskus 1 Dose(s) Inhalation two times a day  gabapentin 300 milliGRAM(s) Oral at bedtime  glucagon  Injectable 1 milliGRAM(s) IntraMuscular once  insulin lispro (ADMELOG) corrective regimen sliding scale   SubCutaneous three times a day before meals  levothyroxine 88 MICROGram(s) Oral <User Schedule>  LORazepam     Tablet 0.5 milliGRAM(s) Oral three times a day  metFORMIN 500 milliGRAM(s) Oral daily  nortriptyline 25 milliGRAM(s) Oral at bedtime  pancrelipase  (CREON 36,000 Lipase Units) 2 Capsule(s) Oral four times a day with meals  pantoprazole    Tablet 40 milliGRAM(s) Oral before breakfast  sertraline 100 milliGRAM(s) Oral daily    MEDICATIONS  (PRN):  acetaminophen     Tablet .. 650 milliGRAM(s) Oral every 6 hours PRN Mild Pain (1 - 3), Moderate Pain (4 - 6), Severe Pain (7 - 10)  albuterol    90 MICROgram(s) HFA Inhaler 2 Puff(s) Inhalation every 6 hours PRN Asthma  dextrose Oral Gel 15 Gram(s) Oral once PRN Blood Glucose LESS THAN 70 milliGRAM(s)/deciliter  diphenhydrAMINE 25 milliGRAM(s) Oral at bedtime PRN insomnia  EPINEPHrine     1 mG/mL Injectable 0.3 milliGRAM(s) IntraMuscular once PRN ANAPHYLAXIS  lidocaine   4% Patch 1 Patch Transdermal daily PRN Low back pain  LORazepam     Tablet 1 milliGRAM(s) Oral every 6 hours PRN Agitation 2/2 mood  LORazepam   Injectable 1 milliGRAM(s) IntraMuscular once PRN Agitation 2/2 mood  ondansetron    Tablet 4 milliGRAM(s) Oral every 6 hours PRN Nausea/vomiting   MEDICATIONS  (STANDING):  atorvastatin 10 milliGRAM(s) Oral at bedtime  ferrous    sulfate 325 milliGRAM(s) Oral at bedtime  fluticasone propionate/ salmeterol 100-50 MICROgram(s) Diskus 1 Dose(s) Inhalation two times a day  gabapentin 300 milliGRAM(s) Oral at bedtime  glucagon  Injectable 1 milliGRAM(s) IntraMuscular once  insulin lispro (ADMELOG) corrective regimen sliding scale   SubCutaneous three times a day before meals  levothyroxine 88 MICROGram(s) Oral <User Schedule>  LORazepam     Tablet 0.5 milliGRAM(s) Oral three times a day  melatonin. 3 milliGRAM(s) Oral at bedtime  metFORMIN 500 milliGRAM(s) Oral daily  nortriptyline 25 milliGRAM(s) Oral at bedtime  pancrelipase  (CREON 36,000 Lipase Units) 2 Capsule(s) Oral four times a day with meals  pantoprazole    Tablet 40 milliGRAM(s) Oral before breakfast  senna 2 Tablet(s) Oral at bedtime  sertraline 100 milliGRAM(s) Oral daily    MEDICATIONS  (PRN):  acetaminophen     Tablet .. 650 milliGRAM(s) Oral every 6 hours PRN Mild Pain (1 - 3), Moderate Pain (4 - 6), Severe Pain (7 - 10)  albuterol    90 MICROgram(s) HFA Inhaler 2 Puff(s) Inhalation every 6 hours PRN Asthma  dextrose Oral Gel 15 Gram(s) Oral once PRN Blood Glucose LESS THAN 70 milliGRAM(s)/deciliter  diphenhydrAMINE 25 milliGRAM(s) Oral at bedtime PRN insomnia  EPINEPHrine     1 mG/mL Injectable 0.3 milliGRAM(s) IntraMuscular once PRN ANAPHYLAXIS  lidocaine   4% Patch 1 Patch Transdermal daily PRN Low back pain  LORazepam     Tablet 1 milliGRAM(s) Oral every 6 hours PRN Agitation 2/2 mood  LORazepam   Injectable 1 milliGRAM(s) IntraMuscular once PRN Agitation 2/2 mood  ondansetron    Tablet 4 milliGRAM(s) Oral every 6 hours PRN Nausea/vomiting  polyethylene glycol 3350 17 Gram(s) Oral daily PRN Constipation

## 2025-06-13 NOTE — BH INPATIENT PSYCHIATRY PROGRESS NOTE - NSBHFUPINTERVALHXFT_PSY_A_CORE
Chart was reviewed and discussed with team, vitals were reviewed. Pt was seen separately by Namita Arrington NP. On presentation, pt was alert, dressed casually, sitting up at her desk. Pt's reported mood is "good," with calm and pleasant affect. Tolerating sertraline well, denies any side effects. Denies feeling tearful, depressed, anxious. Says that she has more energy and has been able to get out of bed, has been socializing with peers on the unit and attending group. Normal appetite, sleeping well. Is still feeling unsure about her plans for discharge and how involved she wants her  to be in discharge planning.  Regarding concerns of dizziness, given hospitalist recs, Metoprolol was discontinued and gabapentin was decreased to 300mg. Pt also took diphenhydramine last night. Pt says that she is no longer feeling dizzy, and that she feels much better than yesterday. Denies headache or nausea.  Chart was reviewed and discussed with team, vitals were reviewed. Pt was seen separately by Namita Arrintgon NP. On presentation, pt was alert, dressed casually, sitting up at her desk. Pt's reported mood is "good," with calm and pleasant affect. Tolerating sertraline well, denies any side effects. Denies feeling tearful, depressed, anxious. Says that she has more energy and has been able to get out of bed, has been socializing with peers on the unit and attending group. Normal appetite, sleeping well. Is still feeling unsure about her plans for discharge and how involved she wants her  to be in discharge planning.  Regarding concerns of dizziness, given hospitalist recs, Metoprolol was discontinued and gabapentin was decreased to 300mg. Pt also took diphenhydramine last night. Pt says that she is no longer feeling dizzy, and that she feels much better than yesterday. Denies headache or nausea.     Discussed starting Melatonin for sleep, she agreed. Patient reported constipation, Senna and Miralax PRN ordered.     Spoke with Daughter Elvi. Informed about recent medication changes and patient willing to have family meeting.

## 2025-06-14 LAB
GLUCOSE BLDC GLUCOMTR-MCNC: 104 MG/DL — HIGH (ref 70–99)
GLUCOSE BLDC GLUCOMTR-MCNC: 108 MG/DL — HIGH (ref 70–99)
GLUCOSE BLDC GLUCOMTR-MCNC: 109 MG/DL — HIGH (ref 70–99)
GLUCOSE BLDC GLUCOMTR-MCNC: 126 MG/DL — HIGH (ref 70–99)

## 2025-06-14 RX ADMIN — Medication 40 MILLIGRAM(S): at 06:58

## 2025-06-14 RX ADMIN — Medication 0.5 MILLIGRAM(S): at 20:35

## 2025-06-14 RX ADMIN — ATORVASTATIN CALCIUM 10 MILLIGRAM(S): 80 TABLET, FILM COATED ORAL at 20:30

## 2025-06-14 RX ADMIN — SERTRALINE 100 MILLIGRAM(S): 100 TABLET, FILM COATED ORAL at 09:19

## 2025-06-14 RX ADMIN — Medication 2 TABLET(S): at 20:36

## 2025-06-14 RX ADMIN — GABAPENTIN 300 MILLIGRAM(S): 400 CAPSULE ORAL at 20:30

## 2025-06-14 RX ADMIN — Medication 25 MILLIGRAM(S): at 20:30

## 2025-06-14 RX ADMIN — Medication 2 CAPSULE(S): at 16:58

## 2025-06-14 RX ADMIN — Medication 25 MILLIGRAM(S): at 20:36

## 2025-06-14 RX ADMIN — Medication 0.5 MILLIGRAM(S): at 12:57

## 2025-06-14 RX ADMIN — Medication 325 MILLIGRAM(S): at 20:36

## 2025-06-14 RX ADMIN — Medication 1 DOSE(S): at 20:36

## 2025-06-14 RX ADMIN — Medication 0.5 MILLIGRAM(S): at 09:19

## 2025-06-14 RX ADMIN — Medication 88 MICROGRAM(S): at 06:17

## 2025-06-14 RX ADMIN — Medication 3 MILLIGRAM(S): at 20:36

## 2025-06-15 LAB
GLUCOSE BLDC GLUCOMTR-MCNC: 110 MG/DL — HIGH (ref 70–99)
GLUCOSE BLDC GLUCOMTR-MCNC: 111 MG/DL — HIGH (ref 70–99)
GLUCOSE BLDC GLUCOMTR-MCNC: 137 MG/DL — HIGH (ref 70–99)
GLUCOSE BLDC GLUCOMTR-MCNC: 142 MG/DL — HIGH (ref 70–99)

## 2025-06-15 RX ADMIN — Medication 1 DOSE(S): at 20:37

## 2025-06-15 RX ADMIN — Medication 0.5 MILLIGRAM(S): at 12:12

## 2025-06-15 RX ADMIN — Medication 40 MILLIGRAM(S): at 08:20

## 2025-06-15 RX ADMIN — Medication 3 MILLIGRAM(S): at 20:36

## 2025-06-15 RX ADMIN — Medication 2 PUFF(S): at 19:17

## 2025-06-15 RX ADMIN — METFORMIN HYDROCHLORIDE 500 MILLIGRAM(S): 500 TABLET ORAL at 08:20

## 2025-06-15 RX ADMIN — Medication 25 MILLIGRAM(S): at 20:37

## 2025-06-15 RX ADMIN — Medication 325 MILLIGRAM(S): at 20:36

## 2025-06-15 RX ADMIN — Medication 25 MILLIGRAM(S): at 20:36

## 2025-06-15 RX ADMIN — Medication 2 CAPSULE(S): at 08:19

## 2025-06-15 RX ADMIN — GABAPENTIN 300 MILLIGRAM(S): 400 CAPSULE ORAL at 20:37

## 2025-06-15 RX ADMIN — ATORVASTATIN CALCIUM 10 MILLIGRAM(S): 80 TABLET, FILM COATED ORAL at 20:37

## 2025-06-15 RX ADMIN — SERTRALINE 100 MILLIGRAM(S): 100 TABLET, FILM COATED ORAL at 08:20

## 2025-06-15 RX ADMIN — Medication 2 CAPSULE(S): at 12:11

## 2025-06-15 RX ADMIN — Medication 88 MICROGRAM(S): at 06:24

## 2025-06-15 RX ADMIN — Medication 0.5 MILLIGRAM(S): at 08:20

## 2025-06-15 RX ADMIN — Medication 2 TABLET(S): at 20:37

## 2025-06-15 RX ADMIN — Medication 0.5 MILLIGRAM(S): at 20:36

## 2025-06-16 LAB
GLUCOSE BLDC GLUCOMTR-MCNC: 103 MG/DL — HIGH (ref 70–99)
GLUCOSE BLDC GLUCOMTR-MCNC: 119 MG/DL — HIGH (ref 70–99)

## 2025-06-16 PROCEDURE — 99233 SBSQ HOSP IP/OBS HIGH 50: CPT

## 2025-06-16 PROCEDURE — 99232 SBSQ HOSP IP/OBS MODERATE 35: CPT | Mod: FS

## 2025-06-16 RX ORDER — LORAZEPAM 4 MG/ML
1 VIAL (ML) INJECTION ONCE
Refills: 0 | Status: DISCONTINUED | OUTPATIENT
Start: 2025-06-16 | End: 2025-06-23

## 2025-06-16 RX ORDER — MELATONIN 5 MG
6 TABLET ORAL AT BEDTIME
Refills: 0 | Status: DISCONTINUED | OUTPATIENT
Start: 2025-06-16 | End: 2025-06-24

## 2025-06-16 RX ORDER — LORAZEPAM 4 MG/ML
0.5 VIAL (ML) INJECTION THREE TIMES A DAY
Refills: 0 | Status: DISCONTINUED | OUTPATIENT
Start: 2025-06-16 | End: 2025-06-19

## 2025-06-16 RX ORDER — INSULIN LISPRO 100 U/ML
INJECTION, SOLUTION INTRAVENOUS; SUBCUTANEOUS
Refills: 0 | Status: DISCONTINUED | OUTPATIENT
Start: 2025-06-16 | End: 2025-06-24

## 2025-06-16 RX ORDER — TRAZODONE HCL 100 MG
50 TABLET ORAL AT BEDTIME
Refills: 0 | Status: DISCONTINUED | OUTPATIENT
Start: 2025-06-16 | End: 2025-06-24

## 2025-06-16 RX ORDER — LORAZEPAM 4 MG/ML
1 VIAL (ML) INJECTION EVERY 6 HOURS
Refills: 0 | Status: DISCONTINUED | OUTPATIENT
Start: 2025-06-16 | End: 2025-06-23

## 2025-06-16 RX ADMIN — Medication 40 MILLIGRAM(S): at 08:18

## 2025-06-16 RX ADMIN — Medication 325 MILLIGRAM(S): at 20:09

## 2025-06-16 RX ADMIN — SERTRALINE 100 MILLIGRAM(S): 100 TABLET, FILM COATED ORAL at 08:18

## 2025-06-16 RX ADMIN — Medication 25 MILLIGRAM(S): at 20:09

## 2025-06-16 RX ADMIN — Medication 88 MICROGRAM(S): at 05:25

## 2025-06-16 RX ADMIN — Medication 0.5 MILLIGRAM(S): at 12:07

## 2025-06-16 RX ADMIN — Medication 1 DOSE(S): at 08:43

## 2025-06-16 RX ADMIN — Medication 0.5 MILLIGRAM(S): at 08:18

## 2025-06-16 RX ADMIN — GABAPENTIN 300 MILLIGRAM(S): 400 CAPSULE ORAL at 20:09

## 2025-06-16 RX ADMIN — Medication 2 CAPSULE(S): at 08:18

## 2025-06-16 RX ADMIN — Medication 1 DOSE(S): at 21:42

## 2025-06-16 RX ADMIN — METFORMIN HYDROCHLORIDE 500 MILLIGRAM(S): 500 TABLET ORAL at 08:18

## 2025-06-16 RX ADMIN — ATORVASTATIN CALCIUM 10 MILLIGRAM(S): 80 TABLET, FILM COATED ORAL at 20:08

## 2025-06-16 RX ADMIN — Medication 6 MILLIGRAM(S): at 20:08

## 2025-06-16 RX ADMIN — Medication 2 CAPSULE(S): at 12:08

## 2025-06-16 RX ADMIN — Medication 0.5 MILLIGRAM(S): at 20:08

## 2025-06-16 RX ADMIN — Medication 2 CAPSULE(S): at 16:39

## 2025-06-16 RX ADMIN — Medication 2 TABLET(S): at 20:08

## 2025-06-16 NOTE — BH INPATIENT PSYCHIATRY PROGRESS NOTE - NSBHMETABOLIC_PSY_ALL_CORE_FT
BMI: BMI (kg/m2): 22.4 (06-14-25 @ 08:12)  HbA1c: A1C with Estimated Average Glucose Result: 5.7 % (06-05-25 @ 08:00)    Glucose: POCT Blood Glucose.: 119 mg/dL (06-16-25 @ 12:04)    BP: --Vital Signs Last 24 Hrs  T(C): 36.7 (06-16-25 @ 07:26), Max: 36.7 (06-16-25 @ 07:26)  T(F): 98.1 (06-16-25 @ 07:26), Max: 98.1 (06-16-25 @ 07:26)  HR: --  BP: --  BP(mean): --  RR: 17 (06-16-25 @ 07:26) (17 - 17)  SpO2: --    Orthostatic VS  06-16-25 @ 07:26  Lying BP: --/-- HR: --  Sitting BP: 134/72 HR: 86  Standing BP: 133/64 HR: 91  Site: --  Mode: --  Orthostatic VS  06-15-25 @ 08:02  Lying BP: --/-- HR: --  Sitting BP: 127/60 HR: 75  Standing BP: 128/66 HR: 85  Site: --  Mode: --    Lipid Panel: Date/Time: 06-05-25 @ 08:00  Cholesterol, Serum: 183  LDL Cholesterol Calculated: 97  HDL Cholesterol, Serum: 66  Total Cholesterol/HDL Ration Measurement: --  Triglycerides, Serum: 112

## 2025-06-16 NOTE — PROGRESS NOTE ADULT - TIME BILLING
Time spent includes direct patient care  (interview and examination of patient), discussion with other providers, support staff and/or patient's family members, review of medical records, ordering diagnostic tests and analyzing results, and documentation.
Review of laboratory data, radiology results, consultants' recommendations, documentation in [facility], discussion with patient/advanced care providers and interdisciplinary staff (such as , social workers, etc). Interventions were performed as documented above.

## 2025-06-16 NOTE — BH INPATIENT PSYCHIATRY PROGRESS NOTE - NSBHCHARTREVIEWVS_PSY_A_CORE FT
Vital Signs Last 24 Hrs  T(C): 36.7 (06-16-25 @ 07:26), Max: 36.7 (06-16-25 @ 07:26)  T(F): 98.1 (06-16-25 @ 07:26), Max: 98.1 (06-16-25 @ 07:26)  HR: --  BP: --  BP(mean): --  RR: 17 (06-16-25 @ 07:26) (17 - 17)  SpO2: --    Orthostatic VS  06-16-25 @ 07:26  Lying BP: --/-- HR: --  Sitting BP: 134/72 HR: 86  Standing BP: 133/64 HR: 91  Site: --  Mode: --  Orthostatic VS  06-15-25 @ 08:02  Lying BP: --/-- HR: --  Sitting BP: 127/60 HR: 75  Standing BP: 128/66 HR: 85  Site: --  Mode: --

## 2025-06-16 NOTE — BH INPATIENT PSYCHIATRY PROGRESS NOTE - CURRENT MEDICATION
MEDICATIONS  (STANDING):  atorvastatin 10 milliGRAM(s) Oral at bedtime  ferrous    sulfate 325 milliGRAM(s) Oral at bedtime  fluticasone propionate/ salmeterol 100-50 MICROgram(s) Diskus 1 Dose(s) Inhalation two times a day  gabapentin 300 milliGRAM(s) Oral at bedtime  glucagon  Injectable 1 milliGRAM(s) IntraMuscular once  insulin lispro (ADMELOG) corrective regimen sliding scale   SubCutaneous three times a day before meals  levothyroxine 88 MICROGram(s) Oral <User Schedule>  LORazepam     Tablet 0.5 milliGRAM(s) Oral three times a day  melatonin. 3 milliGRAM(s) Oral at bedtime  metFORMIN 500 milliGRAM(s) Oral daily  nortriptyline 25 milliGRAM(s) Oral at bedtime  pancrelipase  (CREON 36,000 Lipase Units) 2 Capsule(s) Oral four times a day with meals  pantoprazole    Tablet 40 milliGRAM(s) Oral before breakfast  senna 2 Tablet(s) Oral at bedtime  sertraline 100 milliGRAM(s) Oral daily    MEDICATIONS  (PRN):  acetaminophen     Tablet .. 650 milliGRAM(s) Oral every 6 hours PRN Mild Pain (1 - 3), Moderate Pain (4 - 6), Severe Pain (7 - 10)  albuterol    90 MICROgram(s) HFA Inhaler 2 Puff(s) Inhalation every 6 hours PRN Asthma  dextrose Oral Gel 15 Gram(s) Oral once PRN Blood Glucose LESS THAN 70 milliGRAM(s)/deciliter  diphenhydrAMINE 25 milliGRAM(s) Oral at bedtime PRN insomnia  EPINEPHrine     1 mG/mL Injectable 0.3 milliGRAM(s) IntraMuscular once PRN ANAPHYLAXIS  lidocaine   4% Patch 1 Patch Transdermal daily PRN Low back pain  LORazepam     Tablet 1 milliGRAM(s) Oral every 6 hours PRN Agitation 2/2 mood  LORazepam   Injectable 1 milliGRAM(s) IntraMuscular once PRN Agitation 2/2 mood  ondansetron    Tablet 4 milliGRAM(s) Oral every 6 hours PRN Nausea/vomiting  polyethylene glycol 3350 17 Gram(s) Oral daily PRN Constipation   MEDICATIONS  (STANDING):  atorvastatin 10 milliGRAM(s) Oral at bedtime  ferrous    sulfate 325 milliGRAM(s) Oral at bedtime  fluticasone propionate/ salmeterol 100-50 MICROgram(s) Diskus 1 Dose(s) Inhalation two times a day  gabapentin 300 milliGRAM(s) Oral at bedtime  glucagon  Injectable 1 milliGRAM(s) IntraMuscular once  insulin lispro (ADMELOG) corrective regimen sliding scale   SubCutaneous three times a day before meals  levothyroxine 88 MICROGram(s) Oral <User Schedule>  LORazepam     Tablet 0.5 milliGRAM(s) Oral three times a day  melatonin. 6 milliGRAM(s) Oral at bedtime  metFORMIN 500 milliGRAM(s) Oral daily  nortriptyline 25 milliGRAM(s) Oral at bedtime  pancrelipase  (CREON 36,000 Lipase Units) 2 Capsule(s) Oral four times a day with meals  pantoprazole    Tablet 40 milliGRAM(s) Oral before breakfast  senna 2 Tablet(s) Oral at bedtime  sertraline 100 milliGRAM(s) Oral daily    MEDICATIONS  (PRN):  acetaminophen     Tablet .. 650 milliGRAM(s) Oral every 6 hours PRN Mild Pain (1 - 3), Moderate Pain (4 - 6), Severe Pain (7 - 10)  albuterol    90 MICROgram(s) HFA Inhaler 2 Puff(s) Inhalation every 6 hours PRN Asthma  dextrose Oral Gel 15 Gram(s) Oral once PRN Blood Glucose LESS THAN 70 milliGRAM(s)/deciliter  EPINEPHrine     1 mG/mL Injectable 0.3 milliGRAM(s) IntraMuscular once PRN ANAPHYLAXIS  lidocaine   4% Patch 1 Patch Transdermal daily PRN Low back pain  LORazepam     Tablet 1 milliGRAM(s) Oral every 6 hours PRN Agitation 2/2 mood  LORazepam   Injectable 1 milliGRAM(s) IntraMuscular once PRN Agitation 2/2 mood  ondansetron    Tablet 4 milliGRAM(s) Oral every 6 hours PRN Nausea/vomiting  polyethylene glycol 3350 17 Gram(s) Oral daily PRN Constipation  traZODone 50 milliGRAM(s) Oral at bedtime PRN Insomnia   MEDICATIONS  (STANDING):  atorvastatin 10 milliGRAM(s) Oral at bedtime  ferrous    sulfate 325 milliGRAM(s) Oral at bedtime  fluticasone propionate/ salmeterol 100-50 MICROgram(s) Diskus 1 Dose(s) Inhalation two times a day  gabapentin 300 milliGRAM(s) Oral at bedtime  glucagon  Injectable 1 milliGRAM(s) IntraMuscular once  insulin lispro (ADMELOG) corrective regimen sliding scale   SubCutaneous with breakfast  levothyroxine 88 MICROGram(s) Oral <User Schedule>  LORazepam     Tablet 0.5 milliGRAM(s) Oral three times a day  melatonin. 6 milliGRAM(s) Oral at bedtime  metFORMIN 500 milliGRAM(s) Oral daily  nortriptyline 25 milliGRAM(s) Oral at bedtime  pancrelipase  (CREON 36,000 Lipase Units) 2 Capsule(s) Oral four times a day with meals  pantoprazole    Tablet 40 milliGRAM(s) Oral before breakfast  senna 2 Tablet(s) Oral at bedtime  sertraline 100 milliGRAM(s) Oral daily    MEDICATIONS  (PRN):  acetaminophen     Tablet .. 650 milliGRAM(s) Oral every 6 hours PRN Mild Pain (1 - 3), Moderate Pain (4 - 6), Severe Pain (7 - 10)  albuterol    90 MICROgram(s) HFA Inhaler 2 Puff(s) Inhalation every 6 hours PRN Asthma  dextrose Oral Gel 15 Gram(s) Oral once PRN Blood Glucose LESS THAN 70 milliGRAM(s)/deciliter  EPINEPHrine     1 mG/mL Injectable 0.3 milliGRAM(s) IntraMuscular once PRN ANAPHYLAXIS  lidocaine   4% Patch 1 Patch Transdermal daily PRN Low back pain  LORazepam     Tablet 1 milliGRAM(s) Oral every 6 hours PRN Agitation 2/2 mood  LORazepam   Injectable 1 milliGRAM(s) IntraMuscular once PRN Agitation 2/2 mood  ondansetron    Tablet 4 milliGRAM(s) Oral every 6 hours PRN Nausea/vomiting  polyethylene glycol 3350 17 Gram(s) Oral daily PRN Constipation  traZODone 50 milliGRAM(s) Oral at bedtime PRN Insomnia

## 2025-06-16 NOTE — BH INPATIENT PSYCHIATRY PROGRESS NOTE - NSBHASSESSSUMMFT_PSY_ALL_CORE
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 Binh Valles), PMH of DM, HLD, asthma, hypothyroid, fibromyalgia, denies past violence/NSSI/SA/substance, BIBS for depression and SI.    On assessment, pt was calm, reports improved mood, tolerating medications. Despite one episode of tearfulness during interview, pt reports overall improvement in depressive symptoms. No longer reporting dizziness. Regarding safe discharge, plan to set up family meeting with  and daughter. Plan to continue to monitor on this medication regimen. Given hospitalist recommendations, will continue to hold Metoprolol and switch POCT blood glucose monitoring to once daily.    Plan:   -[facility], 9.13  -No CO needed, routine observation, q15 checks   -Psychiatry:   gabapentin 300 milliGRAM(s) Oral at bedtime  LORazepam     Tablet 0.5 milliGRAM(s) Oral three times a day  sertraline 50 milliGRAM(s) Oral daily  -Medical:   Pt described feeling dizzy for 2 days (6/11-6/12), hospitalist consulted. BP/orthostatics WNL, EKG is NSR, F/u CBC/CMP. Per hospitalist, most likely multifactorial due to polypharmacy, poor PO intake, and lower BP. Recommendations:  - recommend head CT  - plan to hold metformin and ctm to blood surgar and reevaluate  - plan to hold metoprolol and ctm blood presures and reevaluate  - lower gabapentin to 300mg  - recommend titrate off ativan if possible  - trial meclizine if QTc is WNL (EKG from 6/12 showed QTc 348)  Pt requested Tylenol PM, pt already has acetaminophen PRN order, added diphenhydramine 25 mg PO QHS PRN for insomnia    nitrofuratntoin 100mg BID for 5 days (6/05 - 6/10) for UTI.   atorvastatin 10 milliGRAM(s) Oral at bedtime  ferrous    sulfate 325 milliGRAM(s) Oral at bedtime  fluticasone propionate/ salmeterol 100-50 MICROgram(s) Diskus 1 Dose(s) Inhalation two times a day  glucagon  Injectable 1 milliGRAM(s) IntraMuscular once  insulin lispro (ADMELOG) corrective regimen sliding scale   SubCutaneous three times a day before meals  levothyroxine 88 MICROGram(s) Oral <User Schedule>  metFORMIN 500 milliGRAM(s) Oral daily  metoprolol succinate ER 25 milliGRAM(s) Oral daily  moxifloxacin 0.5% Solution 1 Drop(s) Right EYE three times a day  nortriptyline 25 milliGRAM(s) Oral at bedtime  pancrelipase  (CREON 36,000 Lipase Units) 1 Capsule(s) Oral three times a day with meals  pantoprazole    Tablet 40 milliGRAM(s) Oral before breakfast  prednisoLONE acetate 1% Suspension 1 Drop(s) Right EYE three times a day  acetaminophen     Tablet .. 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 Binh Valles), PMH of DM, HLD, asthma, hypothyroid, fibromyalgia, denies past violence/NSSI/SA/substance, BIBS for depression and SI.    On assessment, pt was calm, reports improved mood, tolerating medications. Despite one episode of tearfulness during interview, pt reports overall improvement in depressive symptoms. No longer reporting dizziness. Regarding safe discharge, plan to set up family meeting with  and daughter. Plan to continue to monitor on this medication regimen. Given hospitalist recommendations, will continue to hold Metoprolol and switch POCT blood glucose monitoring to once daily.    Plan:   -[facility], 9.13  -No CO needed, routine observation, q15 checks   -Psychiatry:   gabapentin 300 milliGRAM(s) Oral at bedtime  LORazepam     Tablet 0.5 milliGRAM(s) Oral three times a day  sertraline 100 milliGRAM(s) Oral daily  -Medical:   Pt described feeling dizzy for 2 days (6/11-6/12), hospitalist consulted. BP/orthostatics WNL, EKG is NSR, F/u CBC/CMP. Per hospitalist, most likely multifactorial due to polypharmacy, poor PO intake, and lower BP. Recommendations:  - recommend head CT  - plan to hold metformin and ctm to blood surgar and reevaluate  - plan to hold metoprolol and ctm blood presures and reevaluate  - lower gabapentin to 300mg  - recommend titrate off ativan if possible  - trial meclizine if QTc is WNL (EKG from 6/12 showed QTc 348)  Pt requested Tylenol PM, pt already has acetaminophen PRN order, added diphenhydramine 25 mg PO QHS PRN for insomnia    nitrofuratntoin 100mg BID for 5 days (6/05 - 6/10) for UTI.   atorvastatin 10 milliGRAM(s) Oral at bedtime  ferrous    sulfate 325 milliGRAM(s) Oral at bedtime  fluticasone propionate/ salmeterol 100-50 MICROgram(s) Diskus 1 Dose(s) Inhalation two times a day  glucagon  Injectable 1 milliGRAM(s) IntraMuscular once  insulin lispro (ADMELOG) corrective regimen sliding scale   SubCutaneous three times a day before meals  levothyroxine 88 MICROGram(s) Oral <User Schedule>  metFORMIN 500 milliGRAM(s) Oral daily  metoprolol succinate ER 25 milliGRAM(s) Oral daily  moxifloxacin 0.5% Solution 1 Drop(s) Right EYE three times a day  nortriptyline 25 milliGRAM(s) Oral at bedtime  pancrelipase  (CREON 36,000 Lipase Units) 1 Capsule(s) Oral three times a day with meals  pantoprazole    Tablet 40 milliGRAM(s) Oral before breakfast  prednisoLONE acetate 1% Suspension 1 Drop(s) Right EYE three times a day  acetaminophen     Tablet .. 650 milliGRAM(s) Oral every 6 hours PRN Mild Pain (1 - 3), Moderate Pain (4 - 6), Severe Pain (7 - 10)  albuterol    90 MICROgram(s) HFA Inhaler 2 Puff(s) Inhalation every 6 hours PRN Asthma  dextrose Oral Gel 15 Gram(s) Oral once PRN Blood Glucose LESS THAN 70 milliGRAM(s)/deciliter  EPINEPHrine     1 mG/mL Injectable 0.3 milliGRAM(s) IntraMuscular once PRN ANAPHYLAXIS  ondansetron    Tablet 4 milliGRAM(s) Oral every 6 hours PRN Nausea/vomiting  -Group and milieu therapy  -Dispo as per SW

## 2025-06-16 NOTE — BH INPATIENT PSYCHIATRY PROGRESS NOTE - PRN MEDS
MEDICATIONS  (PRN):  acetaminophen     Tablet .. 650 milliGRAM(s) Oral every 6 hours PRN Mild Pain (1 - 3), Moderate Pain (4 - 6), Severe Pain (7 - 10)  albuterol    90 MICROgram(s) HFA Inhaler 2 Puff(s) Inhalation every 6 hours PRN Asthma  dextrose Oral Gel 15 Gram(s) Oral once PRN Blood Glucose LESS THAN 70 milliGRAM(s)/deciliter  diphenhydrAMINE 25 milliGRAM(s) Oral at bedtime PRN insomnia  EPINEPHrine     1 mG/mL Injectable 0.3 milliGRAM(s) IntraMuscular once PRN ANAPHYLAXIS  lidocaine   4% Patch 1 Patch Transdermal daily PRN Low back pain  LORazepam     Tablet 1 milliGRAM(s) Oral every 6 hours PRN Agitation 2/2 mood  LORazepam   Injectable 1 milliGRAM(s) IntraMuscular once PRN Agitation 2/2 mood  ondansetron    Tablet 4 milliGRAM(s) Oral every 6 hours PRN Nausea/vomiting  polyethylene glycol 3350 17 Gram(s) Oral daily PRN Constipation   MEDICATIONS  (PRN):  acetaminophen     Tablet .. 650 milliGRAM(s) Oral every 6 hours PRN Mild Pain (1 - 3), Moderate Pain (4 - 6), Severe Pain (7 - 10)  albuterol    90 MICROgram(s) HFA Inhaler 2 Puff(s) Inhalation every 6 hours PRN Asthma  dextrose Oral Gel 15 Gram(s) Oral once PRN Blood Glucose LESS THAN 70 milliGRAM(s)/deciliter  EPINEPHrine     1 mG/mL Injectable 0.3 milliGRAM(s) IntraMuscular once PRN ANAPHYLAXIS  lidocaine   4% Patch 1 Patch Transdermal daily PRN Low back pain  LORazepam     Tablet 1 milliGRAM(s) Oral every 6 hours PRN Agitation 2/2 mood  LORazepam   Injectable 1 milliGRAM(s) IntraMuscular once PRN Agitation 2/2 mood  ondansetron    Tablet 4 milliGRAM(s) Oral every 6 hours PRN Nausea/vomiting  polyethylene glycol 3350 17 Gram(s) Oral daily PRN Constipation  traZODone 50 milliGRAM(s) Oral at bedtime PRN Insomnia

## 2025-06-16 NOTE — BH INPATIENT PSYCHIATRY PROGRESS NOTE - NSBHFUPINTERVALHXFT_PSY_A_CORE
Chart was reviewed and discussed with team, vitals were reviewed. Pt was seen alone and then again with Namita Arrington NP. On presentation, pt was alert, dressed casually, wearing appropriate makeup. Pt was calm and cooperative to interview. Pt is adherent to medications, tolerating well with no reported side effects. Reports mood as "okay," and feels like the medication is improving her symptoms. No longer feeling dizzy. Reports sleeping well over the weekend, took diphenhydramine last 3 nights. Woke up this morning with mild headache. Counseled pt on risks of continued use of diphenhydramine, recommended to taking melatonin instead. Pt seemed unsure but expressed understanding. Plan to increase available dose to help with sleep. Pt also endorsed constipation, indicating that she had one bowel movement yesterday but was difficult to pass. Counseled pt on continuing to increase fiber and water, walking around the unit, and continuing to take Senna PRN. Pt expressed understanding. Pt reports normal appetite. Has been attending group. Denies SI/HI/AVH.    Regarding safe discharge plan, pt has not spoken to . When asked, pt became tearful, saying that there are a lot of "problems" at home that she is not ready to deal with. She is nervous to return home because of these problems. Has provided consent for team to speak to  and set up family meeting to discuss discharge.     Metoprolol was held over the weekend given last week's concern of dizziness. Spoke with hospitalist regarding continuing to hold metoprolol now that there has been resolution of dizziness. After review of recent vitals, hospitalist confirmed to continue holding metoprolol for now. Also consulted hospitalist regarding fingersticks QID, hospitalist recommended changing finger sticks to once daily. Chart was reviewed and discussed with team, vitals were reviewed. Pt was seen alone and then again with Namita Arrington NP. On presentation, pt was alert, dressed casually, wearing appropriate makeup. Pt was calm and cooperative to interview. Pt is adherent to medications, tolerating well with no reported side effects. Reports mood as "okay," and feels like the medication is improving her symptoms. No longer feeling dizzy. Reports sleeping well over the weekend, took diphenhydramine last 3 nights. Woke up this morning with mild headache. Counseled pt on risks of continued use of diphenhydramine, recommended to taking melatonin instead. Pt seemed unsure but expressed understanding. Plan to increase available dose of Melatonin to help with sleep. Also ordered Trazodone PRN. Pt also endorsed constipation, indicating that she had one bowel movement yesterday but was difficult to pass. Counseled pt on continuing to increase fiber and water, walking around the unit, and continuing to take Senna PRN. Pt expressed understanding. Pt reports normal appetite. Has been attending group. Denies SI/HI/AVH.    Regarding safe discharge plan, pt has not spoken to . When asked, pt became tearful, saying that there are a lot of "problems" at home that she is not ready to deal with. She is nervous to return home because of these problems. Has provided consent for team to speak to  and set up family meeting to discuss discharge.     Metoprolol was held over the weekend given last week's concern of dizziness. Spoke with hospitalist regarding continuing to hold metoprolol now that there has been resolution of dizziness. After review of recent vitals, hospitalist confirmed to continue holding metoprolol for now. Also consulted hospitalist regarding fingersticks QID, hospitalist recommended changing finger sticks to once daily.

## 2025-06-16 NOTE — BH INPATIENT PSYCHIATRY PROGRESS NOTE - NSBHATTESTCOMMENTATTENDFT_PSY_A_CORE
Care was discussed and reviewed in the interdisciplinary treatment team.  I, Liza Cope MD, have reviewed and verified the documentation.

## 2025-06-16 NOTE — PROGRESS NOTE ADULT - SUBJECTIVE AND OBJECTIVE BOX
CC/Reason for Consult: follow up dizziness    SUBJECTIVE / OVERNIGHT EVENTS:  Patient no longer feeling dizzy.  She has a headache today, would like tylenol.      MEDICATIONS  (STANDING):  atorvastatin 10 milliGRAM(s) Oral at bedtime  ferrous    sulfate 325 milliGRAM(s) Oral at bedtime  fluticasone propionate/ salmeterol 100-50 MICROgram(s) Diskus 1 Dose(s) Inhalation two times a day  gabapentin 300 milliGRAM(s) Oral at bedtime  glucagon  Injectable 1 milliGRAM(s) IntraMuscular once  insulin lispro (ADMELOG) corrective regimen sliding scale   SubCutaneous with breakfast  levothyroxine 88 MICROGram(s) Oral <User Schedule>  LORazepam     Tablet 0.5 milliGRAM(s) Oral three times a day  melatonin. 6 milliGRAM(s) Oral at bedtime  metFORMIN 500 milliGRAM(s) Oral daily  nortriptyline 25 milliGRAM(s) Oral at bedtime  pancrelipase  (CREON 36,000 Lipase Units) 2 Capsule(s) Oral four times a day with meals  pantoprazole    Tablet 40 milliGRAM(s) Oral before breakfast  senna 2 Tablet(s) Oral at bedtime  sertraline 100 milliGRAM(s) Oral daily    MEDICATIONS  (PRN):  acetaminophen     Tablet .. 650 milliGRAM(s) Oral every 6 hours PRN Mild Pain (1 - 3), Moderate Pain (4 - 6), Severe Pain (7 - 10)  albuterol    90 MICROgram(s) HFA Inhaler 2 Puff(s) Inhalation every 6 hours PRN Asthma  dextrose Oral Gel 15 Gram(s) Oral once PRN Blood Glucose LESS THAN 70 milliGRAM(s)/deciliter  EPINEPHrine     1 mG/mL Injectable 0.3 milliGRAM(s) IntraMuscular once PRN ANAPHYLAXIS  lidocaine   4% Patch 1 Patch Transdermal daily PRN Low back pain  LORazepam     Tablet 1 milliGRAM(s) Oral every 6 hours PRN Agitation 2/2 mood  LORazepam   Injectable 1 milliGRAM(s) IntraMuscular once PRN Agitation 2/2 mood  ondansetron    Tablet 4 milliGRAM(s) Oral every 6 hours PRN Nausea/vomiting  polyethylene glycol 3350 17 Gram(s) Oral daily PRN Constipation  traZODone 50 milliGRAM(s) Oral at bedtime PRN Insomnia      Vital Signs Last 24 Hrs  T(C): 36.7 (16 Jun 2025 07:26), Max: 36.7 (16 Jun 2025 07:26)  T(F): 98.1 (16 Jun 2025 07:26), Max: 98.1 (16 Jun 2025 07:26)  HR: 86  BP: 134/72  BP(mean): --  RR: 17 (16 Jun 2025 07:26) (17 - 17)      CAPILLARY BLOOD GLUCOSE      POCT Blood Glucose.: 119 mg/dL (16 Jun 2025 12:04)  POCT Blood Glucose.: 103 mg/dL (16 Jun 2025 08:02)  POCT Blood Glucose.: 110 mg/dL (15 Raymond 2025 20:19)  POCT Blood Glucose.: 142 mg/dL (15 Raymond 2025 16:37)        PHYSICAL EXAM:  GENERAL: NAD  HEAD:  Atraumatic, Normocephalic  EYES: EOMI, conjunctiva and sclera clear  NECK: Supple, No JVD  CHEST/LUNG: Clear to auscultation bilaterally; No wheeze  HEART: Regular rate and rhythm; No murmurs, rubs, or gallops  ABDOMEN: Soft, Nontender, Nondistended; Bowel sounds present  EXTREMITIES:  2+ Peripheral Pulses, No clubbing, cyanosis, or edema  NEUROLOGY: non-focal  SKIN: No rashes or lesions    LABS:      reviewed in sunrise        Care Discussed with Consultants/Other Providers: NP

## 2025-06-17 LAB — GLUCOSE BLDC GLUCOMTR-MCNC: 112 MG/DL — HIGH (ref 70–99)

## 2025-06-17 PROCEDURE — 99231 SBSQ HOSP IP/OBS SF/LOW 25: CPT

## 2025-06-17 PROCEDURE — 99232 SBSQ HOSP IP/OBS MODERATE 35: CPT

## 2025-06-17 PROCEDURE — 70450 CT HEAD/BRAIN W/O DYE: CPT | Mod: 26

## 2025-06-17 RX ADMIN — Medication 0.5 MILLIGRAM(S): at 20:55

## 2025-06-17 RX ADMIN — Medication 6 MILLIGRAM(S): at 20:52

## 2025-06-17 RX ADMIN — Medication 1 DOSE(S): at 20:54

## 2025-06-17 RX ADMIN — Medication 88 MICROGRAM(S): at 06:35

## 2025-06-17 RX ADMIN — GABAPENTIN 300 MILLIGRAM(S): 400 CAPSULE ORAL at 20:55

## 2025-06-17 RX ADMIN — METFORMIN HYDROCHLORIDE 500 MILLIGRAM(S): 500 TABLET ORAL at 08:30

## 2025-06-17 RX ADMIN — Medication 2 CAPSULE(S): at 08:11

## 2025-06-17 RX ADMIN — Medication 25 MILLIGRAM(S): at 20:55

## 2025-06-17 RX ADMIN — Medication 0.5 MILLIGRAM(S): at 13:11

## 2025-06-17 RX ADMIN — Medication 2 CAPSULE(S): at 13:10

## 2025-06-17 RX ADMIN — SERTRALINE 100 MILLIGRAM(S): 100 TABLET, FILM COATED ORAL at 08:30

## 2025-06-17 RX ADMIN — Medication 2 TABLET(S): at 20:52

## 2025-06-17 RX ADMIN — Medication 325 MILLIGRAM(S): at 20:55

## 2025-06-17 RX ADMIN — Medication 0.5 MILLIGRAM(S): at 08:30

## 2025-06-17 RX ADMIN — Medication 1 DOSE(S): at 13:12

## 2025-06-17 RX ADMIN — ATORVASTATIN CALCIUM 10 MILLIGRAM(S): 80 TABLET, FILM COATED ORAL at 20:56

## 2025-06-17 RX ADMIN — Medication 50 MILLIGRAM(S): at 23:19

## 2025-06-17 RX ADMIN — Medication 40 MILLIGRAM(S): at 08:30

## 2025-06-17 NOTE — BH INPATIENT PSYCHIATRY PROGRESS NOTE - NSBHASSESSSUMMFT_PSY_ALL_CORE
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 Binh Valles), PMH of DM, HLD, asthma, hypothyroid, fibromyalgia, denies past violence/NSSI/SA/substance, BIBS for depression and SI.    On assessment, pt was calm, reports improved mood, tolerating medications. Despite one episode of tearfulness during interview, pt reports overall improvement in depressive symptoms. No longer reporting dizziness. Regarding safe discharge, plan to set up family meeting with  and daughter. Plan to continue to monitor on this medication regimen. Given hospitalist recommendations, will continue to hold Metoprolol and switch POCT blood glucose monitoring to once daily.    Plan:   -[facility], 9.13  -No CO needed, routine observation, q15 checks   -Psychiatry:   gabapentin 300 milliGRAM(s) Oral at bedtime  LORazepam     Tablet 0.5 milliGRAM(s) Oral three times a day  sertraline 100 milliGRAM(s) Oral daily  -Medical:   Pt described feeling dizzy for 2 days (6/11-6/12), hospitalist consulted. BP/orthostatics WNL, EKG is NSR, F/u CBC/CMP. Per hospitalist, most likely multifactorial due to polypharmacy, poor PO intake, and lower BP. Recommendations:  - recommend head CT  - plan to hold metformin and ctm to blood surgar and reevaluate  - plan to hold metoprolol and ctm blood presures and reevaluate  - lower gabapentin to 300mg  - recommend titrate off ativan if possible  - trial meclizine if QTc is WNL (EKG from 6/12 showed QTc 348)  Pt requested Tylenol PM, pt already has acetaminophen PRN order, added diphenhydramine 25 mg PO QHS PRN for insomnia    nitrofuratntoin 100mg BID for 5 days (6/05 - 6/10) for UTI.   atorvastatin 10 milliGRAM(s) Oral at bedtime  ferrous    sulfate 325 milliGRAM(s) Oral at bedtime  fluticasone propionate/ salmeterol 100-50 MICROgram(s) Diskus 1 Dose(s) Inhalation two times a day  glucagon  Injectable 1 milliGRAM(s) IntraMuscular once  insulin lispro (ADMELOG) corrective regimen sliding scale   SubCutaneous three times a day before meals  levothyroxine 88 MICROGram(s) Oral <User Schedule>  metFORMIN 500 milliGRAM(s) Oral daily  metoprolol succinate ER 25 milliGRAM(s) Oral daily  moxifloxacin 0.5% Solution 1 Drop(s) Right EYE three times a day  nortriptyline 25 milliGRAM(s) Oral at bedtime  pancrelipase  (CREON 36,000 Lipase Units) 1 Capsule(s) Oral three times a day with meals  pantoprazole    Tablet 40 milliGRAM(s) Oral before breakfast  prednisoLONE acetate 1% Suspension 1 Drop(s) Right EYE three times a day  acetaminophen     Tablet .. 650 milliGRAM(s) Oral every 6 hours PRN Mild Pain (1 - 3), Moderate Pain (4 - 6), Severe Pain (7 - 10)  albuterol    90 MICROgram(s) HFA Inhaler 2 Puff(s) Inhalation every 6 hours PRN Asthma  dextrose Oral Gel 15 Gram(s) Oral once PRN Blood Glucose LESS THAN 70 milliGRAM(s)/deciliter  EPINEPHrine     1 mG/mL Injectable 0.3 milliGRAM(s) IntraMuscular once PRN ANAPHYLAXIS  ondansetron    Tablet 4 milliGRAM(s) Oral every 6 hours PRN Nausea/vomiting  -Group and milieu therapy  -Dispo as per SW

## 2025-06-17 NOTE — BH INPATIENT PSYCHIATRY PROGRESS NOTE - PRN MEDS
MEDICATIONS  (PRN):  acetaminophen     Tablet .. 650 milliGRAM(s) Oral every 6 hours PRN Mild Pain (1 - 3), Moderate Pain (4 - 6), Severe Pain (7 - 10)  albuterol    90 MICROgram(s) HFA Inhaler 2 Puff(s) Inhalation every 6 hours PRN Asthma  dextrose Oral Gel 15 Gram(s) Oral once PRN Blood Glucose LESS THAN 70 milliGRAM(s)/deciliter  EPINEPHrine     1 mG/mL Injectable 0.3 milliGRAM(s) IntraMuscular once PRN ANAPHYLAXIS  lidocaine   4% Patch 1 Patch Transdermal daily PRN Low back pain  LORazepam     Tablet 1 milliGRAM(s) Oral every 6 hours PRN Agitation 2/2 mood  LORazepam   Injectable 1 milliGRAM(s) IntraMuscular once PRN Agitation 2/2 mood  ondansetron    Tablet 4 milliGRAM(s) Oral every 6 hours PRN Nausea/vomiting  polyethylene glycol 3350 17 Gram(s) Oral daily PRN Constipation  traZODone 50 milliGRAM(s) Oral at bedtime PRN Insomnia

## 2025-06-17 NOTE — PROGRESS NOTE ADULT - SUBJECTIVE AND OBJECTIVE BOX
CC/Reason for Consult:     SUBJECTIVE / OVERNIGHT EVENTS:    MEDICATIONS  (STANDING):  atorvastatin 10 milliGRAM(s) Oral at bedtime  ferrous    sulfate 325 milliGRAM(s) Oral at bedtime  fluticasone propionate/ salmeterol 100-50 MICROgram(s) Diskus 1 Dose(s) Inhalation two times a day  gabapentin 300 milliGRAM(s) Oral at bedtime  glucagon  Injectable 1 milliGRAM(s) IntraMuscular once  insulin lispro (ADMELOG) corrective regimen sliding scale   SubCutaneous with breakfast  levothyroxine 88 MICROGram(s) Oral <User Schedule>  LORazepam     Tablet 0.5 milliGRAM(s) Oral three times a day  melatonin. 6 milliGRAM(s) Oral at bedtime  metFORMIN 500 milliGRAM(s) Oral daily  nortriptyline 25 milliGRAM(s) Oral at bedtime  pancrelipase  (CREON 36,000 Lipase Units) 2 Capsule(s) Oral four times a day with meals  pantoprazole    Tablet 40 milliGRAM(s) Oral before breakfast  senna 2 Tablet(s) Oral at bedtime  sertraline 100 milliGRAM(s) Oral daily    MEDICATIONS  (PRN):  acetaminophen     Tablet .. 650 milliGRAM(s) Oral every 6 hours PRN Mild Pain (1 - 3), Moderate Pain (4 - 6), Severe Pain (7 - 10)  albuterol    90 MICROgram(s) HFA Inhaler 2 Puff(s) Inhalation every 6 hours PRN Asthma  dextrose Oral Gel 15 Gram(s) Oral once PRN Blood Glucose LESS THAN 70 milliGRAM(s)/deciliter  EPINEPHrine     1 mG/mL Injectable 0.3 milliGRAM(s) IntraMuscular once PRN ANAPHYLAXIS  lidocaine   4% Patch 1 Patch Transdermal daily PRN Low back pain  LORazepam     Tablet 1 milliGRAM(s) Oral every 6 hours PRN Agitation 2/2 mood  LORazepam   Injectable 1 milliGRAM(s) IntraMuscular once PRN Agitation 2/2 mood  ondansetron    Tablet 4 milliGRAM(s) Oral every 6 hours PRN Nausea/vomiting  polyethylene glycol 3350 17 Gram(s) Oral daily PRN Constipation  traZODone 50 milliGRAM(s) Oral at bedtime PRN Insomnia      Vital Signs Last 24 Hrs  T(C): 36.1 (17 Jun 2025 07:28), Max: 36.1 (17 Jun 2025 07:28)  T(F): 97 (17 Jun 2025 07:28), Max: 97 (17 Jun 2025 07:28)  HR: --  BP: --  BP(mean): --  RR: --  SpO2: --      CAPILLARY BLOOD GLUCOSE      POCT Blood Glucose.: 112 mg/dL (17 Jun 2025 08:06)        PHYSICAL EXAM:  GENERAL: NAD  HEAD:  Atraumatic, Normocephalic  EYES: EOMI, conjunctiva and sclera clear  NECK: Supple, No JVD  CHEST/LUNG: Clear to auscultation bilaterally; No wheeze  HEART: Regular rate and rhythm; No murmurs, rubs, or gallops  ABDOMEN: Soft, Nontender, Nondistended; Bowel sounds present  EXTREMITIES:  2+ Peripheral Pulses, No clubbing, cyanosis, or edema  NEUROLOGY: non-focal  SKIN: No rashes or lesions    LABS:                    RADIOLOGY & ADDITIONAL TESTS:    Imaging Personally Reviewed:    Consultant(s) Notes Reviewed:      Care Discussed with Consultants/Other Providers:   CC/Reason for Consult: dizziness    SUBJECTIVE / OVERNIGHT EVENTS: Denies dizziness     MEDICATIONS  (STANDING):  atorvastatin 10 milliGRAM(s) Oral at bedtime  ferrous    sulfate 325 milliGRAM(s) Oral at bedtime  fluticasone propionate/ salmeterol 100-50 MICROgram(s) Diskus 1 Dose(s) Inhalation two times a day  gabapentin 300 milliGRAM(s) Oral at bedtime  glucagon  Injectable 1 milliGRAM(s) IntraMuscular once  insulin lispro (ADMELOG) corrective regimen sliding scale   SubCutaneous with breakfast  levothyroxine 88 MICROGram(s) Oral <User Schedule>  LORazepam     Tablet 0.5 milliGRAM(s) Oral three times a day  melatonin. 6 milliGRAM(s) Oral at bedtime  metFORMIN 500 milliGRAM(s) Oral daily  nortriptyline 25 milliGRAM(s) Oral at bedtime  pancrelipase  (CREON 36,000 Lipase Units) 2 Capsule(s) Oral four times a day with meals  pantoprazole    Tablet 40 milliGRAM(s) Oral before breakfast  senna 2 Tablet(s) Oral at bedtime  sertraline 100 milliGRAM(s) Oral daily    MEDICATIONS  (PRN):  acetaminophen     Tablet .. 650 milliGRAM(s) Oral every 6 hours PRN Mild Pain (1 - 3), Moderate Pain (4 - 6), Severe Pain (7 - 10)  albuterol    90 MICROgram(s) HFA Inhaler 2 Puff(s) Inhalation every 6 hours PRN Asthma  dextrose Oral Gel 15 Gram(s) Oral once PRN Blood Glucose LESS THAN 70 milliGRAM(s)/deciliter  EPINEPHrine     1 mG/mL Injectable 0.3 milliGRAM(s) IntraMuscular once PRN ANAPHYLAXIS  lidocaine   4% Patch 1 Patch Transdermal daily PRN Low back pain  LORazepam     Tablet 1 milliGRAM(s) Oral every 6 hours PRN Agitation 2/2 mood  LORazepam   Injectable 1 milliGRAM(s) IntraMuscular once PRN Agitation 2/2 mood  ondansetron    Tablet 4 milliGRAM(s) Oral every 6 hours PRN Nausea/vomiting  polyethylene glycol 3350 17 Gram(s) Oral daily PRN Constipation  traZODone 50 milliGRAM(s) Oral at bedtime PRN Insomnia      Vital Signs Last 24 Hrs  T(C): 36.1 (17 Jun 2025 07:28), Max: 36.1 (17 Jun 2025 07:28)  T(F): 97 (17 Jun 2025 07:28), Max: 97 (17 Jun 2025 07:28)  HR: 84  BP: 123/58  BP(mean): --  RR: 15  SpO2: --      CAPILLARY BLOOD GLUCOSE      POCT Blood Glucose.: 112 mg/dL (17 Jun 2025 08:06)        PHYSICAL EXAM:  GENERAL: NAD  HEAD:  Atraumatic, Normocephalic  EYES: EOMI, conjunctiva and sclera clear  NECK: Supple, No JVD  CHEST/LUNG: Clear to auscultation bilaterally; No wheeze  HEART: Regular rate and rhythm; No murmurs, rubs, or gallops  ABDOMEN: Soft, Nontender, Nondistended; Bowel sounds present  EXTREMITIES:  2+ Peripheral Pulses, No clubbing, cyanosis, or edema  NEUROLOGY: non-focal  SKIN: No rashes or lesions    LABS:      reviewed in sunrise              RADIOLOGY & ADDITIONAL TESTS:  < from: CT Head No Cont (06.17.25 @ 10:13) >    ACC: [id number] EXAM:  CT BRAIN   ORDERED BY: MEREDITH MENDOZA     PROCEDURE DATE:  06/17/2025          INTERPRETATION:  CLINICAL INFORMATION: Dizziness. Evaluate for possible   infarction.    COMPARISON: CT head 4/22/2009    CONTRAST:  IV Contrast: None    TECHNIQUE:  Serial axial images were obtained from the skull base to the   vertex using multi-slice helical technique. Sagittal and coronal   reformats were obtained.    FINDINGS:    VENTRICLES AND SULCI: Normal in size and configuration.  INTRA-AXIAL: No mass effect, acute hemorrhage, or midline shift.  There   are periventricular and subcortical white matter hypodensities,   consistent with microvascular type changes. Punctate hypodensities in the   anterior limb of the left internal capsule and left insular cortex,   likely chronic lacunar infarcts (2, 21).  EXTRA-AXIAL: No mass or fluid collection. Basal cisterns are normal in   appearance.    VISUALIZED SINUSES:  Clear.  TYMPANOMASTOID CAVITIES:  Clear.  VISUALIZED ORBITS: Bilateral lens replacement.  CALVARIUM: Intact.    MISCELLANEOUS: None.      IMPRESSION:  No acute intracranial hemorrhage, mass effect, or midline shift.    Chronic microvascular and ischemic changes as above.    --- End of Report ---          LETY ACEVES MD; Resident Radiologist  This document has been electronically signed.  KRISHAN PURCELL MD; Attending Radiologist  This document has been electronically signed. Jun 17 2025 11:27AM    < end of copied text >

## 2025-06-17 NOTE — BH INPATIENT PSYCHIATRY PROGRESS NOTE - NSBHFUPINTERVALHXFT_PSY_A_CORE
Chart was reviewed and discussed with team, vitals were reviewed. Met patient with  and Dr Parekh present. Patient recounted history of infidelity with . Patient was unsure of how to work on the barriers to return home and manage her marriage.  suggested moving out of the house but patient declined. Patient understands she needs to return home. Discussed setting up outpatient treatment including therapist and recommended couples therapy. Patient was tearful during meeting.  Chart was reviewed and discussed with team, vitals were reviewed. Met patient with  and Dr Parekh present. Patient recounted history of infidelity with . Patient was unsure of how to work on the barriers to return home and manage her marriage.  suggested moving out of the house but patient declined. Patient understands she needs to return home. Discussed setting up outpatient treatment including therapist and recommended couples therapy. Patient was tearful during meeting. Patient

## 2025-06-17 NOTE — BH INPATIENT PSYCHIATRY PROGRESS NOTE - NSBHCHARTREVIEWVS_PSY_A_CORE FT
Vital Signs Last 24 Hrs  T(C): 36.1 (06-17-25 @ 07:28), Max: 36.1 (06-17-25 @ 07:28)  T(F): 97 (06-17-25 @ 07:28), Max: 97 (06-17-25 @ 07:28)  HR: --  BP: --  BP(mean): --  RR: --  SpO2: --    Orthostatic VS  06-17-25 @ 07:28  Lying BP: --/-- HR: --  Sitting BP: 129/70 HR: 84  Standing BP: 124/72 HR: 85  Site: --  Mode: --  Orthostatic VS  06-16-25 @ 07:26  Lying BP: --/-- HR: --  Sitting BP: 134/72 HR: 86  Standing BP: 133/64 HR: 91  Site: --  Mode: --

## 2025-06-17 NOTE — BH INPATIENT PSYCHIATRY PROGRESS NOTE - NSBHATTESTBILLING_PSY_A_CORE
22266-Igueeqaivf OBS or IP - moderate complexity OR 35-49 mins 41995-Ipeviheqtl OBS or IP - high complexity OR 50-79 mins

## 2025-06-17 NOTE — PROGRESS NOTE ADULT - ASSESSMENT
60F admitted to [facility] for depression, with fatigue, dizziness, nausea.    Plan:  Nausea/gastroparesis:   -no nausea today  -may use zofran prn  -small frequent meals     Dizziness:  - now resolved  - was Likely multifactorial d/t polypharmacy (recently increased dose of gabapentin, ativan TID), poor PO intake (lower sugar), and lower blood pressure. UA is negative. Less likely stroke.   - head CT unremarkable   - D/amelia metoprolol, BP has been stable  - Lowered gabapentin dose to 300mg qhs   - Recommend titrate off ativan if possible  - Can Trial meclizine if vertigo recurs  HTN:  - D/amelia toprol, continue to monitor blood pressure/HR, VS have been stable    DM2:  - A1C 5.7 and glucose levels ranging 90-100s.   - can reduce FSBG monitoring to once daily  -continue statin for ASCVD prevention    Leg cramps:  -continue gabapentin  -patient is prescribed quinine  -Quinine is not recommended for leg cramps due to side effects and potential serious adverse outcomes such as cardiac arrhythmias, hemolytic uremic syndrome, central nervous system toxicity (such as drowsiness, ataxia, convulsions, and coma), respiratory depression  -common side effects include headache, vasodilation and sweating, nausea, tinnitus, hearing impairment, vertigo or dizziness, blurred vision, and disturbance in color perception. More severe manifestations can include vomiting, diarrhea, abdominal pain, deafness, blindness  -would not continue quinine unless patient reports severe leg cramps and then would only use if she has none of her current symptoms and then not for more than 200mg qhs x 3 days    Fibromyalgia:  -continue gabapentin, nortriptyline  -avoid opioids    Hypothyroidism  -s/p thyroidectomy  -continue synthroid, TSH at goal    Asthma  -albuterol prn, no wheeze currently    Urinary incontinence:  -has been seen by uro-gyn, responded to pelvic floor exercises    Cataract surgery  -continue drops as prescribed by her ophthalmologist  -f/u and questions should be directed to her ophthalmologist    Depression:  -management per primary team  
60F admitted to [facility] for depression, with fatigue, dizziness, nausea.    Plan:  Nausea/gastroparesis:   -no nausea today  -may use zofran prn  -small frequent meals     Dizziness:  - now resolved  - was Likely multifactorial d/t polypharmacy (recently increased dose of gabapentin, ativan TID), poor PO intake (lower sugar), and lower blood pressure. UA is negative. Less likely stroke.   - can hold off on head CT since symptoms have resolved   - D/amelia metoprolol, BP has been stable  - Lowered gabapentin dose to 300mg qhs   - Recommend titrate off ativan if possible  - Can Trial meclizine if vertigo recurs  HTN:  - D/amelia toprol, continue to monitor blood pressure/HR, VS have been stable    DM2:  - A1C 5.7 and glucose levels ranging 90-100s.   - can reduce FSBG monitoring to once daily  -continue statin for ASCVD prevention    Leg cramps:  -continue gabapentin  -patient is prescribed quinine  -Quinine is not recommended for leg cramps due to side effects and potential serious adverse outcomes such as cardiac arrhythmias, hemolytic uremic syndrome, central nervous system toxicity (such as drowsiness, ataxia, convulsions, and coma), respiratory depression  -common side effects include headache, vasodilation and sweating, nausea, tinnitus, hearing impairment, vertigo or dizziness, blurred vision, and disturbance in color perception. More severe manifestations can include vomiting, diarrhea, abdominal pain, deafness, blindness  -would not continue quinine unless patient reports severe leg cramps and then would only use if she has none of her current symptoms and then not for more than 200mg qhs x 3 days    Fibromyalgia:  -continue gabapentin, nortriptyline  -avoid opioids    Hypothyroidism  -s/p thyroidectomy  -continue synthroid, TSH at goal    Asthma  -albuterol prn, no wheeze currently    Urinary incontinence:  -has been seen by uro-gyn, responded to pelvic floor exercises    Cataract surgery  -continue drops as prescribed by her ophthalmologist  -f/u and questions should be directed to her ophthalmologist    Depression:  -management per primary team  
60F admitted to [facility] for depression, with fatigue, dizziness, nausea.    Plan:  Nausea/gastroparesis:   -may use zofran prn  -pt says she will try to eat when creon available  -small frequent meals     Dizziness:  - Likely multifactorial d/t polypharmacy (recently increased dose of gabapentin, ativan TID), poor PO intake (lower sugar), and lower blood pressure. UA is negative. Less likely stroke.   - Recommend head CT given patient is reporting constant vertigo like symptoms   - D/c metformin (A1C 5.7 and sugars are ranging 90-120s).   - D/c metoprolol for HTN (blood pressure wnl)  - Lower gabapentin dose to 300mg qhs   - Recommend titrate off ativan if possible  - Trial meclizine if QTc is wnl     HTN:  - D/c toprol and monitor blood pressure/HR.     DM2:  - A1C 5.7 and glucose levels ranging 90-100s.   - D/c metformin for now in the setting of dizziness - monitor sugars and if stable can remain off of metformin and d/c fingersticks  -continue statin for ASCVD prevention    Leg cramps:  -continue gabapentin  -patient is prescribed quinine  -Quinine is not recommended for leg cramps due to side effects and potential serious adverse outcomes such as cardiac arrhythmias, hemolytic uremic syndrome, central nervous system toxicity (such as drowsiness, ataxia, convulsions, and coma), respiratory depression  -common side effects include headache, vasodilation and sweating, nausea, tinnitus, hearing impairment, vertigo or dizziness, blurred vision, and disturbance in color perception. More severe manifestations can include vomiting, diarrhea, abdominal pain, deafness, blindness  -would not continue quinine unless patient reports severe leg cramps and then would only use if she has none of her current symptoms and then not for more than 200mg qhs x 3 days    Fibromyalgia:  -continue gabapentin, nortriptyline  -avoid opioids    Hypothyroidism  -s/p thyroidectomy  -continue synthroid, TSH at goal    Asthma  -albuterol prn, no wheeze currently    Urinary incontinence:  -has been seen by uro-gyn, responded to pelvic floor exercises    Cataract surgery  -continue drops as prescribed by her ophthalmologist  -f/u and questions should be directed to her ophthalmologist    Depression:  -management per primary team

## 2025-06-17 NOTE — BH INPATIENT PSYCHIATRY PROGRESS NOTE - NSBHMETABOLIC_PSY_ALL_CORE_FT
BMI: BMI (kg/m2): 22.4 (06-14-25 @ 08:12)  HbA1c: A1C with Estimated Average Glucose Result: 5.7 % (06-05-25 @ 08:00)    Glucose: POCT Blood Glucose.: 112 mg/dL (06-17-25 @ 08:06)    BP: --Vital Signs Last 24 Hrs  T(C): 36.1 (06-17-25 @ 07:28), Max: 36.1 (06-17-25 @ 07:28)  T(F): 97 (06-17-25 @ 07:28), Max: 97 (06-17-25 @ 07:28)  HR: --  BP: --  BP(mean): --  RR: --  SpO2: --    Orthostatic VS  06-17-25 @ 07:28  Lying BP: --/-- HR: --  Sitting BP: 129/70 HR: 84  Standing BP: 124/72 HR: 85  Site: --  Mode: --  Orthostatic VS  06-16-25 @ 07:26  Lying BP: --/-- HR: --  Sitting BP: 134/72 HR: 86  Standing BP: 133/64 HR: 91  Site: --  Mode: --    Lipid Panel: Date/Time: 06-05-25 @ 08:00  Cholesterol, Serum: 183  LDL Cholesterol Calculated: 97  HDL Cholesterol, Serum: 66  Total Cholesterol/HDL Ration Measurement: --  Triglycerides, Serum: 112

## 2025-06-17 NOTE — BH INPATIENT PSYCHIATRY PROGRESS NOTE - CURRENT MEDICATION
MEDICATIONS  (STANDING):  atorvastatin 10 milliGRAM(s) Oral at bedtime  ferrous    sulfate 325 milliGRAM(s) Oral at bedtime  fluticasone propionate/ salmeterol 100-50 MICROgram(s) Diskus 1 Dose(s) Inhalation two times a day  gabapentin 300 milliGRAM(s) Oral at bedtime  glucagon  Injectable 1 milliGRAM(s) IntraMuscular once  insulin lispro (ADMELOG) corrective regimen sliding scale   SubCutaneous with breakfast  levothyroxine 88 MICROGram(s) Oral <User Schedule>  LORazepam     Tablet 0.5 milliGRAM(s) Oral three times a day  melatonin. 6 milliGRAM(s) Oral at bedtime  metFORMIN 500 milliGRAM(s) Oral daily  nortriptyline 25 milliGRAM(s) Oral at bedtime  pancrelipase  (CREON 36,000 Lipase Units) 2 Capsule(s) Oral four times a day with meals  pantoprazole    Tablet 40 milliGRAM(s) Oral before breakfast  senna 2 Tablet(s) Oral at bedtime  sertraline 100 milliGRAM(s) Oral daily    MEDICATIONS  (PRN):  acetaminophen     Tablet .. 650 milliGRAM(s) Oral every 6 hours PRN Mild Pain (1 - 3), Moderate Pain (4 - 6), Severe Pain (7 - 10)  albuterol    90 MICROgram(s) HFA Inhaler 2 Puff(s) Inhalation every 6 hours PRN Asthma  dextrose Oral Gel 15 Gram(s) Oral once PRN Blood Glucose LESS THAN 70 milliGRAM(s)/deciliter  EPINEPHrine     1 mG/mL Injectable 0.3 milliGRAM(s) IntraMuscular once PRN ANAPHYLAXIS  lidocaine   4% Patch 1 Patch Transdermal daily PRN Low back pain  LORazepam     Tablet 1 milliGRAM(s) Oral every 6 hours PRN Agitation 2/2 mood  LORazepam   Injectable 1 milliGRAM(s) IntraMuscular once PRN Agitation 2/2 mood  ondansetron    Tablet 4 milliGRAM(s) Oral every 6 hours PRN Nausea/vomiting  polyethylene glycol 3350 17 Gram(s) Oral daily PRN Constipation  traZODone 50 milliGRAM(s) Oral at bedtime PRN Insomnia

## 2025-06-18 ENCOUNTER — APPOINTMENT (OUTPATIENT)
Dept: ORTHOPEDIC SURGERY | Facility: CLINIC | Age: 60
End: 2025-06-18

## 2025-06-18 LAB — GLUCOSE BLDC GLUCOMTR-MCNC: 120 MG/DL — HIGH (ref 70–99)

## 2025-06-18 PROCEDURE — 99232 SBSQ HOSP IP/OBS MODERATE 35: CPT | Mod: FS

## 2025-06-18 RX ADMIN — Medication 25 MILLIGRAM(S): at 20:19

## 2025-06-18 RX ADMIN — Medication 1 DOSE(S): at 09:18

## 2025-06-18 RX ADMIN — Medication 40 MILLIGRAM(S): at 08:30

## 2025-06-18 RX ADMIN — Medication 50 MILLIGRAM(S): at 22:06

## 2025-06-18 RX ADMIN — Medication 325 MILLIGRAM(S): at 20:18

## 2025-06-18 RX ADMIN — GABAPENTIN 300 MILLIGRAM(S): 400 CAPSULE ORAL at 20:21

## 2025-06-18 RX ADMIN — Medication 88 MICROGRAM(S): at 06:29

## 2025-06-18 RX ADMIN — METFORMIN HYDROCHLORIDE 500 MILLIGRAM(S): 500 TABLET ORAL at 09:16

## 2025-06-18 RX ADMIN — SERTRALINE 100 MILLIGRAM(S): 100 TABLET, FILM COATED ORAL at 09:16

## 2025-06-18 RX ADMIN — Medication 0.5 MILLIGRAM(S): at 12:51

## 2025-06-18 RX ADMIN — Medication 2 TABLET(S): at 20:19

## 2025-06-18 RX ADMIN — Medication 0.5 MILLIGRAM(S): at 09:16

## 2025-06-18 RX ADMIN — Medication 2 CAPSULE(S): at 12:37

## 2025-06-18 RX ADMIN — ATORVASTATIN CALCIUM 10 MILLIGRAM(S): 80 TABLET, FILM COATED ORAL at 20:19

## 2025-06-18 RX ADMIN — Medication 1 DOSE(S): at 22:06

## 2025-06-18 RX ADMIN — Medication 0.5 MILLIGRAM(S): at 20:19

## 2025-06-18 NOTE — BH INPATIENT PSYCHIATRY PROGRESS NOTE - NSBHFUPINTERVALHXFT_PSY_A_CORE
Chart was reviewed and case was discussed with team, vitals were reviewed. Pt is adherent to medication. Pt was seen separately by Namita Arrington NP. On presentation, pt was alert, cooperative to being interviewed, pleasant affect. Reports mood as "good." Endorses improved energy, decrease in depressive symptoms. Is tolerating medication regimen, does not report any side effects. Pt continues to have difficulty sleeping. Says that melatonin 6mg was not helpful, and had to return last night for Trazodone before able to sleep. Reports normal appetite. Has been attending group. Denies SI/HI/AVH.  After family meeting, she reports that she has made the decision to separate from her . Says that she feels very happy about this decision.  will continue to live at home, patient says that she is comfortable with this and feels safe going home. Pt is not concerned that symptoms will return on discharge.     Pt requested follow-up appointments near her house, as [place] drive into other [place]. Requests [place] speaking providers.

## 2025-06-18 NOTE — BH INPATIENT PSYCHIATRY PROGRESS NOTE - NSBHCHARTREVIEWVS_PSY_A_CORE FT
Vital Signs Last 24 Hrs  T(C): 36.8 (06-18-25 @ 08:12), Max: 36.8 (06-18-25 @ 08:12)  T(F): 98.2 (06-18-25 @ 08:12), Max: 98.2 (06-18-25 @ 08:12)  HR: --  BP: --  BP(mean): --  RR: 18 (06-18-25 @ 08:12) (18 - 18)  SpO2: --    Orthostatic VS  06-18-25 @ 08:12  Lying BP: --/-- HR: --  Sitting BP: 123/58 HR: 84  Standing BP: 120/62 HR: 87  Site: --  Mode: --  Orthostatic VS  06-17-25 @ 07:28  Lying BP: --/-- HR: --  Sitting BP: 129/70 HR: 84  Standing BP: 124/72 HR: 85  Site: --  Mode: --

## 2025-06-18 NOTE — BH INPATIENT PSYCHIATRY PROGRESS NOTE - NSBHMETABOLIC_PSY_ALL_CORE_FT
BMI: BMI (kg/m2): 22.4 (06-14-25 @ 08:12)  HbA1c: A1C with Estimated Average Glucose Result: 5.7 % (06-05-25 @ 08:00)    Glucose: POCT Blood Glucose.: 120 mg/dL (06-18-25 @ 08:37)    BP: --Vital Signs Last 24 Hrs  T(C): 36.8 (06-18-25 @ 08:12), Max: 36.8 (06-18-25 @ 08:12)  T(F): 98.2 (06-18-25 @ 08:12), Max: 98.2 (06-18-25 @ 08:12)  HR: --  BP: --  BP(mean): --  RR: 18 (06-18-25 @ 08:12) (18 - 18)  SpO2: --    Orthostatic VS  06-18-25 @ 08:12  Lying BP: --/-- HR: --  Sitting BP: 123/58 HR: 84  Standing BP: 120/62 HR: 87  Site: --  Mode: --  Orthostatic VS  06-17-25 @ 07:28  Lying BP: --/-- HR: --  Sitting BP: 129/70 HR: 84  Standing BP: 124/72 HR: 85  Site: --  Mode: --    Lipid Panel: Date/Time: 06-05-25 @ 08:00  Cholesterol, Serum: 183  LDL Cholesterol Calculated: 97  HDL Cholesterol, Serum: 66  Total Cholesterol/HDL Ration Measurement: --  Triglycerides, Serum: 112

## 2025-06-18 NOTE — BH INPATIENT PSYCHIATRY PROGRESS NOTE - NSBHATTESTBILLING_PSY_A_CORE
45550-Prebkqysdu OBS or IP - high complexity OR 50-79 mins 16790-Vmdvjdnnwr OBS or IP - moderate complexity OR 35-49 mins

## 2025-06-18 NOTE — BH INPATIENT PSYCHIATRY PROGRESS NOTE - NSBHASSESSSUMMFT_PSY_ALL_CORE
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 Binh Valles), PMH of DM, HLD, asthma, hypothyroid, fibromyalgia, denies past violence/NSSI/SA/substance, BIBS for depression and SI.    On assessment, pt was calm, reports improved mood, tolerating medications. Pt reports overall symptomatic improvement, appears calmer, less tearful. Pt feels comfortable being discharged to family home. Plan to continue to monitor on this medication regimen. Given hospitalist recommendations, will continue to hold Metoprolol and switch POCT blood glucose monitoring to once daily.    Plan:   -Admit to [facility], 9.13  -No CO needed, routine observation, q15 checks   -Psychiatry:   gabapentin 300 milliGRAM(s) Oral at bedtime  LORazepam     Tablet 0.5 milliGRAM(s) Oral three times a day  sertraline 100 milliGRAM(s) Oral daily  -Medical:   Pt described feeling dizzy for 2 days (6/11-6/12), hospitalist consulted. BP/orthostatics WNL, EKG is NSR, F/u CBC/CMP. Per hospitalist, most likely multifactorial due to polypharmacy, poor PO intake, and lower BP. Recommendations:  - recommend head CT  - plan to hold metformin and ctm to blood surgar and reevaluate  - plan to hold metoprolol and ctm blood presures and reevaluate  - lower gabapentin to 300mg  - recommend titrate off ativan if possible  - trial meclizine if QTc is WNL (EKG from 6/12 showed QTc 348)  Pt requested Tylenol PM, pt already has acetaminophen PRN order, added diphenhydramine 25 mg PO QHS PRN for insomnia    nitrofuratntoin 100mg BID for 5 days (6/05 - 6/10) for UTI.   atorvastatin 10 milliGRAM(s) Oral at bedtime  ferrous    sulfate 325 milliGRAM(s) Oral at bedtime  fluticasone propionate/ salmeterol 100-50 MICROgram(s) Diskus 1 Dose(s) Inhalation two times a day  glucagon  Injectable 1 milliGRAM(s) IntraMuscular once  insulin lispro (ADMELOG) corrective regimen sliding scale   SubCutaneous three times a day before meals  levothyroxine 88 MICROGram(s) Oral <User Schedule>  metFORMIN 500 milliGRAM(s) Oral daily  metoprolol succinate ER 25 milliGRAM(s) Oral daily  moxifloxacin 0.5% Solution 1 Drop(s) Right EYE three times a day  nortriptyline 25 milliGRAM(s) Oral at bedtime  pancrelipase  (CREON 36,000 Lipase Units) 1 Capsule(s) Oral three times a day with meals  pantoprazole    Tablet 40 milliGRAM(s) Oral before breakfast  prednisoLONE acetate 1% Suspension 1 Drop(s) Right EYE three times a day  acetaminophen     Tablet .. 650 milliGRAM(s) Oral every 6 hours PRN Mild Pain (1 - 3), Moderate Pain (4 - 6), Severe Pain (7 - 10)  albuterol    90 MICROgram(s) HFA Inhaler 2 Puff(s) Inhalation every 6 hours PRN Asthma  dextrose Oral Gel 15 Gram(s) Oral once PRN Blood Glucose LESS THAN 70 milliGRAM(s)/deciliter  EPINEPHrine     1 mG/mL Injectable 0.3 milliGRAM(s) IntraMuscular once PRN ANAPHYLAXIS  ondansetron    Tablet 4 milliGRAM(s) Oral every 6 hours PRN Nausea/vomiting  -Group and milieu therapy  -Dispo as per SW

## 2025-06-18 NOTE — BH INPATIENT PSYCHIATRY PROGRESS NOTE - NSBHATTESTTYPEVISIT_PSY_A_CORE
On-site Attending supervising JENNIFER (99XXX codes) On-site Attending with Resident/Fellow/Student and JENNIFER (99XXX codes)

## 2025-06-19 LAB — GLUCOSE BLDC GLUCOMTR-MCNC: 110 MG/DL — HIGH (ref 70–99)

## 2025-06-19 PROCEDURE — 99232 SBSQ HOSP IP/OBS MODERATE 35: CPT | Mod: FS

## 2025-06-19 RX ORDER — LANOLIN/MINERAL OIL/PETROLATUM
1 OINTMENT (GRAM) OPHTHALMIC (EYE) DAILY
Refills: 0 | Status: DISCONTINUED | OUTPATIENT
Start: 2025-06-19 | End: 2025-06-21

## 2025-06-19 RX ORDER — LORAZEPAM 4 MG/ML
0.5 VIAL (ML) INJECTION
Refills: 0 | Status: DISCONTINUED | OUTPATIENT
Start: 2025-06-19 | End: 2025-06-23

## 2025-06-19 RX ADMIN — Medication 0.5 MILLIGRAM(S): at 20:34

## 2025-06-19 RX ADMIN — METFORMIN HYDROCHLORIDE 500 MILLIGRAM(S): 500 TABLET ORAL at 16:51

## 2025-06-19 RX ADMIN — Medication 2 CAPSULE(S): at 16:50

## 2025-06-19 RX ADMIN — SERTRALINE 100 MILLIGRAM(S): 100 TABLET, FILM COATED ORAL at 10:24

## 2025-06-19 RX ADMIN — Medication 2 CAPSULE(S): at 12:28

## 2025-06-19 RX ADMIN — Medication 40 MILLIGRAM(S): at 08:45

## 2025-06-19 RX ADMIN — Medication 650 MILLIGRAM(S): at 19:03

## 2025-06-19 RX ADMIN — Medication 25 MILLIGRAM(S): at 20:33

## 2025-06-19 RX ADMIN — Medication 1 DOSE(S): at 22:16

## 2025-06-19 RX ADMIN — Medication 88 MICROGRAM(S): at 06:40

## 2025-06-19 RX ADMIN — Medication 325 MILLIGRAM(S): at 20:34

## 2025-06-19 RX ADMIN — ATORVASTATIN CALCIUM 10 MILLIGRAM(S): 80 TABLET, FILM COATED ORAL at 20:34

## 2025-06-19 RX ADMIN — Medication 6 MILLIGRAM(S): at 20:36

## 2025-06-19 RX ADMIN — Medication 2 TABLET(S): at 20:35

## 2025-06-19 RX ADMIN — Medication 50 MILLIGRAM(S): at 22:16

## 2025-06-19 RX ADMIN — GABAPENTIN 300 MILLIGRAM(S): 400 CAPSULE ORAL at 20:34

## 2025-06-19 RX ADMIN — Medication 1 MILLIGRAM(S): at 22:12

## 2025-06-19 NOTE — BH INPATIENT PSYCHIATRY PROGRESS NOTE - PRN MEDS
MEDICATIONS  (PRN):  acetaminophen     Tablet .. 650 milliGRAM(s) Oral every 6 hours PRN Mild Pain (1 - 3), Moderate Pain (4 - 6), Severe Pain (7 - 10)  albuterol    90 MICROgram(s) HFA Inhaler 2 Puff(s) Inhalation every 6 hours PRN Asthma  dextrose Oral Gel 15 Gram(s) Oral once PRN Blood Glucose LESS THAN 70 milliGRAM(s)/deciliter  EPINEPHrine     1 mG/mL Injectable 0.3 milliGRAM(s) IntraMuscular once PRN ANAPHYLAXIS  lidocaine   4% Patch 1 Patch Transdermal daily PRN Low back pain  LORazepam     Tablet 1 milliGRAM(s) Oral every 6 hours PRN Agitation 2/2 mood  LORazepam   Injectable 1 milliGRAM(s) IntraMuscular once PRN Agitation 2/2 mood  ondansetron    Tablet 4 milliGRAM(s) Oral every 6 hours PRN Nausea/vomiting  polyethylene glycol 3350 17 Gram(s) Oral daily PRN Constipation  traZODone 50 milliGRAM(s) Oral at bedtime PRN Insomnia   MEDICATIONS  (PRN):  acetaminophen     Tablet .. 650 milliGRAM(s) Oral every 6 hours PRN Mild Pain (1 - 3), Moderate Pain (4 - 6), Severe Pain (7 - 10)  albuterol    90 MICROgram(s) HFA Inhaler 2 Puff(s) Inhalation every 6 hours PRN Asthma  artificial  tears Solution 1 Drop(s) Both EYES daily PRN dry eyes  dextrose Oral Gel 15 Gram(s) Oral once PRN Blood Glucose LESS THAN 70 milliGRAM(s)/deciliter  EPINEPHrine     1 mG/mL Injectable 0.3 milliGRAM(s) IntraMuscular once PRN ANAPHYLAXIS  lidocaine   4% Patch 1 Patch Transdermal daily PRN Low back pain  LORazepam     Tablet 1 milliGRAM(s) Oral every 6 hours PRN Agitation 2/2 mood  LORazepam   Injectable 1 milliGRAM(s) IntraMuscular once PRN Agitation 2/2 mood  ondansetron    Tablet 4 milliGRAM(s) Oral every 6 hours PRN Nausea/vomiting  polyethylene glycol 3350 17 Gram(s) Oral daily PRN Constipation  traZODone 50 milliGRAM(s) Oral at bedtime PRN Insomnia

## 2025-06-19 NOTE — BH INPATIENT PSYCHIATRY PROGRESS NOTE - NSBHFUPINTERVALHXFT_PSY_A_CORE
Chart was reviewed and case was discussed with team, vitals were reviewed. Pt is adherent to medication. Patient reported feeling really good, is happy to be here and has not problem staying here longer however understands she needs to go home eventually. Patient reported she is isolative because she "made a mistake" with a peer and does not want any problems on the unit. She also expressed concern about peers that are disruptive. Encouraged her to at least attend the groups. Patient reported she plans to separate from  but will not seek divorce. Patient reported her daughter are her protective factors.  Chart was reviewed and case was discussed with team, vitals were reviewed. Pt is adherent to medication. Patient reported feeling really good, is happy to be here and has not problem staying here longer however understands she needs to go home eventually. Patient reported she is isolative because she "made a mistake" with a peer and does not want any problems on the unit. She also expressed concern about peers that are disruptive. Encouraged her to at least attend the groups. Patient reported she plans to separate from  but will not seek divorce. Patient reported her daughter are her protective factors. Patient reported impaired sleep due to environmental circumstances such at routine checks, uncomfortable bed and light in the room.

## 2025-06-19 NOTE — BH TREATMENT PLAN - ANXIETY/PANIC/FEAR NURSING INTERVENTIONS/RECOMMENDATIONS
assist to identify coping skills and practice them  prn med as needed  monitor for effect
encourage pt to use 2 copping skills
encourage pt to use 2 copping skills

## 2025-06-19 NOTE — BH INPATIENT PSYCHIATRY PROGRESS NOTE - NSBHMETABOLIC_PSY_ALL_CORE_FT
BMI: BMI (kg/m2): 22.4 (06-14-25 @ 08:12)  HbA1c: A1C with Estimated Average Glucose Result: 5.7 % (06-05-25 @ 08:00)    Glucose: POCT Blood Glucose.: 110 mg/dL (06-19-25 @ 08:16)    BP: --Vital Signs Last 24 Hrs  T(C): 36.6 (06-19-25 @ 08:45), Max: 36.6 (06-19-25 @ 08:45)  T(F): 97.9 (06-19-25 @ 08:45), Max: 97.9 (06-19-25 @ 08:45)  HR: --  BP: --  BP(mean): --  RR: 18 (06-19-25 @ 08:45) (18 - 18)  SpO2: --    Orthostatic VS  06-19-25 @ 08:45  Lying BP: --/-- HR: --  Sitting BP: 127/60 HR: 80  Standing BP: 119/58 HR: 84  Site: --  Mode: --  Orthostatic VS  06-18-25 @ 08:12  Lying BP: --/-- HR: --  Sitting BP: 123/58 HR: 84  Standing BP: 120/62 HR: 87  Site: --  Mode: --    Lipid Panel: Date/Time: 06-05-25 @ 08:00  Cholesterol, Serum: 183  LDL Cholesterol Calculated: 97  HDL Cholesterol, Serum: 66  Total Cholesterol/HDL Ration Measurement: --  Triglycerides, Serum: 112

## 2025-06-19 NOTE — BH INPATIENT PSYCHIATRY PROGRESS NOTE - CURRENT MEDICATION
MEDICATIONS  (STANDING):  atorvastatin 10 milliGRAM(s) Oral at bedtime  ferrous    sulfate 325 milliGRAM(s) Oral at bedtime  fluticasone propionate/ salmeterol 100-50 MICROgram(s) Diskus 1 Dose(s) Inhalation two times a day  gabapentin 300 milliGRAM(s) Oral at bedtime  glucagon  Injectable 1 milliGRAM(s) IntraMuscular once  insulin lispro (ADMELOG) corrective regimen sliding scale   SubCutaneous with breakfast  levothyroxine 88 MICROGram(s) Oral <User Schedule>  LORazepam     Tablet 0.5 milliGRAM(s) Oral two times a day  melatonin. 6 milliGRAM(s) Oral at bedtime  metFORMIN 500 milliGRAM(s) Oral daily  nortriptyline 25 milliGRAM(s) Oral at bedtime  pancrelipase  (CREON 36,000 Lipase Units) 2 Capsule(s) Oral four times a day with meals  pantoprazole    Tablet 40 milliGRAM(s) Oral before breakfast  senna 2 Tablet(s) Oral at bedtime  sertraline 100 milliGRAM(s) Oral daily    MEDICATIONS  (PRN):  acetaminophen     Tablet .. 650 milliGRAM(s) Oral every 6 hours PRN Mild Pain (1 - 3), Moderate Pain (4 - 6), Severe Pain (7 - 10)  albuterol    90 MICROgram(s) HFA Inhaler 2 Puff(s) Inhalation every 6 hours PRN Asthma  dextrose Oral Gel 15 Gram(s) Oral once PRN Blood Glucose LESS THAN 70 milliGRAM(s)/deciliter  EPINEPHrine     1 mG/mL Injectable 0.3 milliGRAM(s) IntraMuscular once PRN ANAPHYLAXIS  lidocaine   4% Patch 1 Patch Transdermal daily PRN Low back pain  LORazepam     Tablet 1 milliGRAM(s) Oral every 6 hours PRN Agitation 2/2 mood  LORazepam   Injectable 1 milliGRAM(s) IntraMuscular once PRN Agitation 2/2 mood  ondansetron    Tablet 4 milliGRAM(s) Oral every 6 hours PRN Nausea/vomiting  polyethylene glycol 3350 17 Gram(s) Oral daily PRN Constipation  traZODone 50 milliGRAM(s) Oral at bedtime PRN Insomnia   MEDICATIONS  (STANDING):  atorvastatin 10 milliGRAM(s) Oral at bedtime  ferrous    sulfate 325 milliGRAM(s) Oral at bedtime  fluticasone propionate/ salmeterol 100-50 MICROgram(s) Diskus 1 Dose(s) Inhalation two times a day  gabapentin 300 milliGRAM(s) Oral at bedtime  glucagon  Injectable 1 milliGRAM(s) IntraMuscular once  insulin lispro (ADMELOG) corrective regimen sliding scale   SubCutaneous with breakfast  levothyroxine 88 MICROGram(s) Oral <User Schedule>  LORazepam     Tablet 0.5 milliGRAM(s) Oral two times a day  melatonin. 6 milliGRAM(s) Oral at bedtime  metFORMIN 500 milliGRAM(s) Oral daily  nortriptyline 25 milliGRAM(s) Oral at bedtime  pancrelipase  (CREON 36,000 Lipase Units) 2 Capsule(s) Oral four times a day with meals  pantoprazole    Tablet 40 milliGRAM(s) Oral before breakfast  senna 2 Tablet(s) Oral at bedtime  sertraline 100 milliGRAM(s) Oral daily    MEDICATIONS  (PRN):  acetaminophen     Tablet .. 650 milliGRAM(s) Oral every 6 hours PRN Mild Pain (1 - 3), Moderate Pain (4 - 6), Severe Pain (7 - 10)  albuterol    90 MICROgram(s) HFA Inhaler 2 Puff(s) Inhalation every 6 hours PRN Asthma  artificial  tears Solution 1 Drop(s) Both EYES daily PRN dry eyes  dextrose Oral Gel 15 Gram(s) Oral once PRN Blood Glucose LESS THAN 70 milliGRAM(s)/deciliter  EPINEPHrine     1 mG/mL Injectable 0.3 milliGRAM(s) IntraMuscular once PRN ANAPHYLAXIS  lidocaine   4% Patch 1 Patch Transdermal daily PRN Low back pain  LORazepam     Tablet 1 milliGRAM(s) Oral every 6 hours PRN Agitation 2/2 mood  LORazepam   Injectable 1 milliGRAM(s) IntraMuscular once PRN Agitation 2/2 mood  ondansetron    Tablet 4 milliGRAM(s) Oral every 6 hours PRN Nausea/vomiting  polyethylene glycol 3350 17 Gram(s) Oral daily PRN Constipation  traZODone 50 milliGRAM(s) Oral at bedtime PRN Insomnia

## 2025-06-19 NOTE — BH TREATMENT PLAN - NSCMSPTSTRENGTHS_PSY_ALL_CORE
Expressive of emotions/Sharyn/spirituality/Highly motivated for treatment/Self confidence

## 2025-06-19 NOTE — BH INPATIENT PSYCHIATRY PROGRESS NOTE - NSBHCHARTREVIEWVS_PSY_A_CORE FT
Vital Signs Last 24 Hrs  T(C): 36.6 (06-19-25 @ 08:45), Max: 36.6 (06-19-25 @ 08:45)  T(F): 97.9 (06-19-25 @ 08:45), Max: 97.9 (06-19-25 @ 08:45)  HR: --  BP: --  BP(mean): --  RR: 18 (06-19-25 @ 08:45) (18 - 18)  SpO2: --    Orthostatic VS  06-19-25 @ 08:45  Lying BP: --/-- HR: --  Sitting BP: 127/60 HR: 80  Standing BP: 119/58 HR: 84  Site: --  Mode: --  Orthostatic VS  06-18-25 @ 08:12  Lying BP: --/-- HR: --  Sitting BP: 123/58 HR: 84  Standing BP: 120/62 HR: 87  Site: --  Mode: --

## 2025-06-19 NOTE — BH TREATMENT PLAN - NSTXDEPRESINTERRN_PSY_ALL_CORE
Pt is compliant with medications. Pt's VSS FS is in control
provide one onone time with RN q shhift x 10 minutes and encourage to verbalize thoughts and feelings
Pt is compliant with medications. Pt's VSS FS is in control

## 2025-06-19 NOTE — BH INPATIENT PSYCHIATRY PROGRESS NOTE - NSBHASSESSSUMMFT_PSY_ALL_CORE
Pt is a 61 yo woman, , helps take care of her father, on disability, PPH of MDD and panic attacks, in outpt tx w a psychiatrist (Dr. Binh Valles), PMH of DM, HLD, asthma, hypothyroid, fibromyalgia, denies past violence/NSSI/SA/substance, BIBS for depression and SI.    On assessment, pt was calm, reports improved mood, tolerating medications. Pt reports overall symptomatic improvement, appears calmer, less tearful. Pt feels comfortable being discharged to family home.     Plan:   -Admit to [facility], 9.13  -No CO needed, routine observation, q15 checks   -Psychiatry:   gabapentin 300 milliGRAM(s) Oral at bedtime  LORazepam     Tablet 0.5 milliGRAM(s) Oral three times a day  sertraline 100 milliGRAM(s) Oral daily  -Medical:   Pt described feeling dizzy for 2 days (6/11-6/12), hospitalist consulted. BP/orthostatics WNL, EKG is NSR, F/u CBC/CMP. Per hospitalist, most likely multifactorial due to polypharmacy, poor PO intake, and lower BP. Recommendations:  - recommend head CT  - plan to hold metformin and ctm to blood surgar and reevaluate  - plan to hold metoprolol and ctm blood presures and reevaluate  - lower gabapentin to 300mg  - recommend titrate off ativan if possible  - trial meclizine if QTc is WNL (EKG from 6/12 showed QTc 348)  Pt requested Tylenol PM, pt already has acetaminophen PRN order, added diphenhydramine 25 mg PO QHS PRN for insomnia    nitrofuratntoin 100mg BID for 5 days (6/05 - 6/10) for UTI.   atorvastatin 10 milliGRAM(s) Oral at bedtime  ferrous    sulfate 325 milliGRAM(s) Oral at bedtime  fluticasone propionate/ salmeterol 100-50 MICROgram(s) Diskus 1 Dose(s) Inhalation two times a day  glucagon  Injectable 1 milliGRAM(s) IntraMuscular once  insulin lispro (ADMELOG) corrective regimen sliding scale   SubCutaneous three times a day before meals  levothyroxine 88 MICROGram(s) Oral <User Schedule>  metFORMIN 500 milliGRAM(s) Oral daily  metoprolol succinate ER 25 milliGRAM(s) Oral daily  moxifloxacin 0.5% Solution 1 Drop(s) Right EYE three times a day  nortriptyline 25 milliGRAM(s) Oral at bedtime  pancrelipase  (CREON 36,000 Lipase Units) 1 Capsule(s) Oral three times a day with meals  pantoprazole    Tablet 40 milliGRAM(s) Oral before breakfast  prednisoLONE acetate 1% Suspension 1 Drop(s) Right EYE three times a day  acetaminophen     Tablet .. 650 milliGRAM(s) Oral every 6 hours PRN Mild Pain (1 - 3), Moderate Pain (4 - 6), Severe Pain (7 - 10)  albuterol    90 MICROgram(s) HFA Inhaler 2 Puff(s) Inhalation every 6 hours PRN Asthma  dextrose Oral Gel 15 Gram(s) Oral once PRN Blood Glucose LESS THAN 70 milliGRAM(s)/deciliter  EPINEPHrine     1 mG/mL Injectable 0.3 milliGRAM(s) IntraMuscular once PRN ANAPHYLAXIS  ondansetron    Tablet 4 milliGRAM(s) Oral every 6 hours PRN Nausea/vomiting  -Group and milieu therapy  -Dispo as per SW

## 2025-06-19 NOTE — BH TREATMENT PLAN - NSDCCRITERIA_PSY_ALL_CORE
Improved mood and functioning, absence of suicidal thoughts, CGI </=3

## 2025-06-19 NOTE — BH TREATMENT PLAN - NSTXHEALTHGOAL_PSY_ALL_CORE
Will be able to identify 3 healthy lifestyle choices such as medication adherence, obtaining primary care and wellness activities

## 2025-06-20 LAB — GLUCOSE BLDC GLUCOMTR-MCNC: 124 MG/DL — HIGH (ref 70–99)

## 2025-06-20 PROCEDURE — 99232 SBSQ HOSP IP/OBS MODERATE 35: CPT | Mod: FS

## 2025-06-20 RX ADMIN — SERTRALINE 100 MILLIGRAM(S): 100 TABLET, FILM COATED ORAL at 08:24

## 2025-06-20 RX ADMIN — GABAPENTIN 300 MILLIGRAM(S): 400 CAPSULE ORAL at 19:53

## 2025-06-20 RX ADMIN — Medication 650 MILLIGRAM(S): at 19:50

## 2025-06-20 RX ADMIN — Medication 325 MILLIGRAM(S): at 19:53

## 2025-06-20 RX ADMIN — Medication 40 MILLIGRAM(S): at 08:24

## 2025-06-20 RX ADMIN — Medication 50 MILLIGRAM(S): at 21:27

## 2025-06-20 RX ADMIN — METFORMIN HYDROCHLORIDE 500 MILLIGRAM(S): 500 TABLET ORAL at 08:24

## 2025-06-20 RX ADMIN — Medication 0.5 MILLIGRAM(S): at 08:23

## 2025-06-20 RX ADMIN — Medication 2 TABLET(S): at 19:52

## 2025-06-20 RX ADMIN — Medication 88 MICROGRAM(S): at 06:09

## 2025-06-20 RX ADMIN — Medication 6 MILLIGRAM(S): at 21:27

## 2025-06-20 RX ADMIN — ATORVASTATIN CALCIUM 10 MILLIGRAM(S): 80 TABLET, FILM COATED ORAL at 19:53

## 2025-06-20 RX ADMIN — Medication 0.5 MILLIGRAM(S): at 19:53

## 2025-06-20 RX ADMIN — Medication 650 MILLIGRAM(S): at 20:40

## 2025-06-20 RX ADMIN — Medication 2 CAPSULE(S): at 12:05

## 2025-06-20 RX ADMIN — Medication 25 MILLIGRAM(S): at 19:52

## 2025-06-20 RX ADMIN — Medication 2 CAPSULE(S): at 19:52

## 2025-06-20 RX ADMIN — Medication 1 DROP(S): at 12:05

## 2025-06-20 RX ADMIN — Medication 2 CAPSULE(S): at 16:45

## 2025-06-20 RX ADMIN — Medication 1 DOSE(S): at 19:54

## 2025-06-20 RX ADMIN — Medication 2 CAPSULE(S): at 08:24

## 2025-06-20 RX ADMIN — Medication 1 DOSE(S): at 08:23

## 2025-06-20 NOTE — BH INPATIENT PSYCHIATRY PROGRESS NOTE - NSBHFUPINTERVALHXFT_PSY_A_CORE
Chart was reviewed and case was discussed with team, vitals were reviewed. Pt was seen separately by Namita Arrington NP and assessment was discussed. Pt is adherent to medication. Patient reported feeling anxious today because she had a disagreement with her roommate and feels that her roommate is upset with her. She felt very anxious about this yesterday as well, took standing (0.5mg) and PRN (1mg) Ativan to control anxiety but felt that it did not help. She is concerned that her anxiety is not well controlled with current medication and that she will have similar symptoms of anxiety after discharge. Otherwise, pt is tolerating medications well, denies any side effects, feels that her depressive symptoms are well controlled. Denies SI/HI/AVH. Denies any symptoms of panic or worries of having a panic attack. Counseled on the use of coping skills in addition to medication to help manage anxiety. Appetite is normal. Continues to report impaired sleep due to environmental circumstances such at routine checks, uncomfortable bed and light in the room.

## 2025-06-20 NOTE — BH INPATIENT PSYCHIATRY PROGRESS NOTE - CURRENT MEDICATION
MEDICATIONS  (STANDING):  atorvastatin 10 milliGRAM(s) Oral at bedtime  ferrous    sulfate 325 milliGRAM(s) Oral at bedtime  fluticasone propionate/ salmeterol 100-50 MICROgram(s) Diskus 1 Dose(s) Inhalation two times a day  gabapentin 300 milliGRAM(s) Oral at bedtime  glucagon  Injectable 1 milliGRAM(s) IntraMuscular once  insulin lispro (ADMELOG) corrective regimen sliding scale   SubCutaneous with breakfast  levothyroxine 88 MICROGram(s) Oral <User Schedule>  LORazepam     Tablet 0.5 milliGRAM(s) Oral two times a day  melatonin. 6 milliGRAM(s) Oral at bedtime  metFORMIN 500 milliGRAM(s) Oral daily  nortriptyline 25 milliGRAM(s) Oral at bedtime  pancrelipase  (CREON 36,000 Lipase Units) 2 Capsule(s) Oral four times a day with meals  pantoprazole    Tablet 40 milliGRAM(s) Oral before breakfast  senna 2 Tablet(s) Oral at bedtime  sertraline 100 milliGRAM(s) Oral daily    MEDICATIONS  (PRN):  acetaminophen     Tablet .. 650 milliGRAM(s) Oral every 6 hours PRN Mild Pain (1 - 3), Moderate Pain (4 - 6), Severe Pain (7 - 10)  albuterol    90 MICROgram(s) HFA Inhaler 2 Puff(s) Inhalation every 6 hours PRN Asthma  artificial  tears Solution 1 Drop(s) Both EYES daily PRN dry eyes  dextrose Oral Gel 15 Gram(s) Oral once PRN Blood Glucose LESS THAN 70 milliGRAM(s)/deciliter  EPINEPHrine     1 mG/mL Injectable 0.3 milliGRAM(s) IntraMuscular once PRN ANAPHYLAXIS  lidocaine   4% Patch 1 Patch Transdermal daily PRN Low back pain  LORazepam     Tablet 1 milliGRAM(s) Oral every 6 hours PRN Agitation 2/2 mood  LORazepam   Injectable 1 milliGRAM(s) IntraMuscular once PRN Agitation 2/2 mood  ondansetron    Tablet 4 milliGRAM(s) Oral every 6 hours PRN Nausea/vomiting  polyethylene glycol 3350 17 Gram(s) Oral daily PRN Constipation  traZODone 50 milliGRAM(s) Oral at bedtime PRN Insomnia

## 2025-06-20 NOTE — BH INPATIENT PSYCHIATRY PROGRESS NOTE - PRN MEDS
MEDICATIONS  (PRN):  acetaminophen     Tablet .. 650 milliGRAM(s) Oral every 6 hours PRN Mild Pain (1 - 3), Moderate Pain (4 - 6), Severe Pain (7 - 10)  albuterol    90 MICROgram(s) HFA Inhaler 2 Puff(s) Inhalation every 6 hours PRN Asthma  artificial  tears Solution 1 Drop(s) Both EYES daily PRN dry eyes  dextrose Oral Gel 15 Gram(s) Oral once PRN Blood Glucose LESS THAN 70 milliGRAM(s)/deciliter  EPINEPHrine     1 mG/mL Injectable 0.3 milliGRAM(s) IntraMuscular once PRN ANAPHYLAXIS  lidocaine   4% Patch 1 Patch Transdermal daily PRN Low back pain  LORazepam     Tablet 1 milliGRAM(s) Oral every 6 hours PRN Agitation 2/2 mood  LORazepam   Injectable 1 milliGRAM(s) IntraMuscular once PRN Agitation 2/2 mood  ondansetron    Tablet 4 milliGRAM(s) Oral every 6 hours PRN Nausea/vomiting  polyethylene glycol 3350 17 Gram(s) Oral daily PRN Constipation  traZODone 50 milliGRAM(s) Oral at bedtime PRN Insomnia

## 2025-06-20 NOTE — BH INPATIENT PSYCHIATRY PROGRESS NOTE - NSBHATTESTATTENDPERFORM_PSY_A_CORE
Medical Decision Making
History/Exam/Medical Decision Making
Medical Decision Making

## 2025-06-20 NOTE — BH INPATIENT PSYCHIATRY PROGRESS NOTE - NSBHMETABOLIC_PSY_ALL_CORE_FT
BMI: BMI (kg/m2): 22.4 (06-14-25 @ 08:12)  HbA1c: A1C with Estimated Average Glucose Result: 5.7 % (06-05-25 @ 08:00)    Glucose: POCT Blood Glucose.: 124 mg/dL (06-20-25 @ 08:26)    BP: --Vital Signs Last 24 Hrs  T(C): 36.7 (06-20-25 @ 07:26), Max: 36.7 (06-20-25 @ 07:26)  T(F): 98 (06-20-25 @ 07:26), Max: 98 (06-20-25 @ 07:26)  HR: --  BP: --  BP(mean): --  RR: 20 (06-20-25 @ 07:26) (20 - 20)  SpO2: --    Orthostatic VS  06-20-25 @ 07:26  Lying BP: --/-- HR: --  Sitting BP: 137/68 HR: 84  Standing BP: 137/65 HR: 90  Site: --  Mode: --  Orthostatic VS  06-19-25 @ 08:45  Lying BP: --/-- HR: --  Sitting BP: 127/60 HR: 80  Standing BP: 119/58 HR: 84  Site: --  Mode: --    Lipid Panel: Date/Time: 06-05-25 @ 08:00  Cholesterol, Serum: 183  LDL Cholesterol Calculated: 97  HDL Cholesterol, Serum: 66  Total Cholesterol/HDL Ration Measurement: --  Triglycerides, Serum: 112

## 2025-06-20 NOTE — BH INPATIENT PSYCHIATRY PROGRESS NOTE - NSBHCHARTREVIEWVS_PSY_A_CORE FT
Vital Signs Last 24 Hrs  T(C): 36.7 (06-20-25 @ 07:26), Max: 36.7 (06-20-25 @ 07:26)  T(F): 98 (06-20-25 @ 07:26), Max: 98 (06-20-25 @ 07:26)  HR: --  BP: --  BP(mean): --  RR: 20 (06-20-25 @ 07:26) (20 - 20)  SpO2: --    Orthostatic VS  06-20-25 @ 07:26  Lying BP: --/-- HR: --  Sitting BP: 137/68 HR: 84  Standing BP: 137/65 HR: 90  Site: --  Mode: --  Orthostatic VS  06-19-25 @ 08:45  Lying BP: --/-- HR: --  Sitting BP: 127/60 HR: 80  Standing BP: 119/58 HR: 84  Site: --  Mode: --

## 2025-06-21 LAB — GLUCOSE BLDC GLUCOMTR-MCNC: 111 MG/DL — HIGH (ref 70–99)

## 2025-06-21 RX ORDER — LANOLIN/MINERAL OIL/PETROLATUM
1 OINTMENT (GRAM) OPHTHALMIC (EYE)
Refills: 0 | Status: DISCONTINUED | OUTPATIENT
Start: 2025-06-21 | End: 2025-06-24

## 2025-06-21 RX ADMIN — Medication 40 MILLIGRAM(S): at 08:23

## 2025-06-21 RX ADMIN — METFORMIN HYDROCHLORIDE 500 MILLIGRAM(S): 500 TABLET ORAL at 08:22

## 2025-06-21 RX ADMIN — Medication 2 TABLET(S): at 20:37

## 2025-06-21 RX ADMIN — Medication 2 CAPSULE(S): at 08:23

## 2025-06-21 RX ADMIN — Medication 1 DROP(S): at 09:57

## 2025-06-21 RX ADMIN — Medication 2 CAPSULE(S): at 12:07

## 2025-06-21 RX ADMIN — ATORVASTATIN CALCIUM 10 MILLIGRAM(S): 80 TABLET, FILM COATED ORAL at 20:37

## 2025-06-21 RX ADMIN — Medication 25 MILLIGRAM(S): at 20:37

## 2025-06-21 RX ADMIN — Medication 325 MILLIGRAM(S): at 20:37

## 2025-06-21 RX ADMIN — Medication 88 MICROGRAM(S): at 05:24

## 2025-06-21 RX ADMIN — Medication 1 DOSE(S): at 08:24

## 2025-06-21 RX ADMIN — Medication 1 DROP(S): at 22:17

## 2025-06-21 RX ADMIN — GABAPENTIN 300 MILLIGRAM(S): 400 CAPSULE ORAL at 20:37

## 2025-06-21 RX ADMIN — Medication 0.5 MILLIGRAM(S): at 08:23

## 2025-06-21 RX ADMIN — Medication 6 MILLIGRAM(S): at 20:37

## 2025-06-21 RX ADMIN — Medication 0.5 MILLIGRAM(S): at 20:37

## 2025-06-21 RX ADMIN — SERTRALINE 100 MILLIGRAM(S): 100 TABLET, FILM COATED ORAL at 08:22

## 2025-06-22 LAB — GLUCOSE BLDC GLUCOMTR-MCNC: 114 MG/DL — HIGH (ref 70–99)

## 2025-06-22 RX ORDER — MELATONIN 5 MG
3 TABLET ORAL ONCE
Refills: 0 | Status: COMPLETED | OUTPATIENT
Start: 2025-06-22 | End: 2025-06-22

## 2025-06-22 RX ORDER — DIPHENHYDRAMINE HCL 12.5MG/5ML
25 ELIXIR ORAL ONCE
Refills: 0 | Status: COMPLETED | OUTPATIENT
Start: 2025-06-22 | End: 2025-06-22

## 2025-06-22 RX ADMIN — Medication 1 DROP(S): at 09:03

## 2025-06-22 RX ADMIN — METFORMIN HYDROCHLORIDE 500 MILLIGRAM(S): 500 TABLET ORAL at 08:33

## 2025-06-22 RX ADMIN — Medication 40 MILLIGRAM(S): at 08:33

## 2025-06-22 RX ADMIN — Medication 2 CAPSULE(S): at 12:08

## 2025-06-22 RX ADMIN — Medication 1 DOSE(S): at 20:09

## 2025-06-22 RX ADMIN — Medication 1 DROP(S): at 16:40

## 2025-06-22 RX ADMIN — Medication 2 TABLET(S): at 20:10

## 2025-06-22 RX ADMIN — Medication 25 MILLIGRAM(S): at 20:09

## 2025-06-22 RX ADMIN — Medication 25 MILLIGRAM(S): at 00:24

## 2025-06-22 RX ADMIN — Medication 1 DROP(S): at 22:20

## 2025-06-22 RX ADMIN — Medication 88 MICROGRAM(S): at 06:31

## 2025-06-22 RX ADMIN — Medication 3 MILLIGRAM(S): at 00:25

## 2025-06-22 RX ADMIN — SERTRALINE 100 MILLIGRAM(S): 100 TABLET, FILM COATED ORAL at 08:33

## 2025-06-22 RX ADMIN — Medication 6 MILLIGRAM(S): at 22:18

## 2025-06-22 RX ADMIN — Medication 2 CAPSULE(S): at 08:33

## 2025-06-22 RX ADMIN — Medication 1 DOSE(S): at 09:03

## 2025-06-22 RX ADMIN — GABAPENTIN 300 MILLIGRAM(S): 400 CAPSULE ORAL at 20:09

## 2025-06-22 RX ADMIN — Medication 0.5 MILLIGRAM(S): at 08:32

## 2025-06-22 RX ADMIN — Medication 0.5 MILLIGRAM(S): at 20:09

## 2025-06-22 RX ADMIN — Medication 325 MILLIGRAM(S): at 20:09

## 2025-06-22 RX ADMIN — Medication 2 CAPSULE(S): at 16:40

## 2025-06-22 RX ADMIN — ATORVASTATIN CALCIUM 10 MILLIGRAM(S): 80 TABLET, FILM COATED ORAL at 20:09

## 2025-06-23 LAB — GLUCOSE BLDC GLUCOMTR-MCNC: 112 MG/DL — HIGH (ref 70–99)

## 2025-06-23 PROCEDURE — 99231 SBSQ HOSP IP/OBS SF/LOW 25: CPT

## 2025-06-23 RX ORDER — METFORMIN HYDROCHLORIDE 500 MG/1
1 TABLET ORAL
Qty: 30 | Refills: 0
Start: 2025-06-23 | End: 2025-07-22

## 2025-06-23 RX ORDER — LORAZEPAM 4 MG/ML
1 VIAL (ML) INJECTION ONCE
Refills: 0 | Status: DISCONTINUED | OUTPATIENT
Start: 2025-06-23 | End: 2025-06-24

## 2025-06-23 RX ORDER — SERTRALINE 100 MG/1
1 TABLET, FILM COATED ORAL
Qty: 30 | Refills: 0
Start: 2025-06-23 | End: 2025-07-22

## 2025-06-23 RX ORDER — LORAZEPAM 4 MG/ML
1 VIAL (ML) INJECTION EVERY 6 HOURS
Refills: 0 | Status: DISCONTINUED | OUTPATIENT
Start: 2025-06-23 | End: 2025-06-24

## 2025-06-23 RX ORDER — GABAPENTIN 400 MG/1
1 CAPSULE ORAL
Qty: 30 | Refills: 0
Start: 2025-06-23 | End: 2025-07-22

## 2025-06-23 RX ORDER — NORTRIPTYLINE HCL 75 MG
1 CAPSULE ORAL
Qty: 30 | Refills: 0
Start: 2025-06-23 | End: 2025-07-22

## 2025-06-23 RX ORDER — LORAZEPAM 4 MG/ML
0.5 VIAL (ML) INJECTION
Refills: 0 | Status: DISCONTINUED | OUTPATIENT
Start: 2025-06-23 | End: 2025-06-24

## 2025-06-23 RX ORDER — MELATONIN 5 MG
2 TABLET ORAL
Qty: 60 | Refills: 0
Start: 2025-06-23 | End: 2025-07-22

## 2025-06-23 RX ORDER — LEVOTHYROXINE SODIUM 300 MCG
1 TABLET ORAL
Qty: 30 | Refills: 0
Start: 2025-06-23 | End: 2025-07-22

## 2025-06-23 RX ORDER — LIPASE/PROTEASE/AMYLASE 10K-37.5K
2 CAPSULE,DELAYED RELEASE (ENTERIC COATED) ORAL
Qty: 240 | Refills: 0
Start: 2025-06-23 | End: 2025-07-22

## 2025-06-23 RX ORDER — LORAZEPAM 4 MG/ML
1 VIAL (ML) INJECTION
Qty: 30 | Refills: 0
Start: 2025-06-23 | End: 2025-07-07

## 2025-06-23 RX ORDER — FERROUS SULFATE 137(45) MG
1 TABLET, EXTENDED RELEASE ORAL
Qty: 0 | Refills: 0 | DISCHARGE
Start: 2025-06-23

## 2025-06-23 RX ORDER — ATORVASTATIN CALCIUM 80 MG/1
1 TABLET, FILM COATED ORAL
Qty: 30 | Refills: 0
Start: 2025-06-23 | End: 2025-07-22

## 2025-06-23 RX ADMIN — ATORVASTATIN CALCIUM 10 MILLIGRAM(S): 80 TABLET, FILM COATED ORAL at 20:42

## 2025-06-23 RX ADMIN — Medication 88 MICROGRAM(S): at 06:19

## 2025-06-23 RX ADMIN — Medication 325 MILLIGRAM(S): at 20:42

## 2025-06-23 RX ADMIN — Medication 25 MILLIGRAM(S): at 20:42

## 2025-06-23 RX ADMIN — Medication 2 CAPSULE(S): at 16:28

## 2025-06-23 RX ADMIN — Medication 1 DOSE(S): at 09:57

## 2025-06-23 RX ADMIN — Medication 1 DROP(S): at 20:45

## 2025-06-23 RX ADMIN — Medication 0.5 MILLIGRAM(S): at 20:42

## 2025-06-23 RX ADMIN — Medication 2 TABLET(S): at 20:42

## 2025-06-23 RX ADMIN — SERTRALINE 100 MILLIGRAM(S): 100 TABLET, FILM COATED ORAL at 08:54

## 2025-06-23 RX ADMIN — Medication 40 MILLIGRAM(S): at 08:54

## 2025-06-23 RX ADMIN — Medication 50 MILLIGRAM(S): at 23:53

## 2025-06-23 RX ADMIN — Medication 1 DOSE(S): at 20:45

## 2025-06-23 RX ADMIN — Medication 1 DROP(S): at 16:28

## 2025-06-23 RX ADMIN — Medication 6 MILLIGRAM(S): at 22:00

## 2025-06-23 RX ADMIN — Medication 0.5 MILLIGRAM(S): at 08:54

## 2025-06-23 RX ADMIN — GABAPENTIN 300 MILLIGRAM(S): 400 CAPSULE ORAL at 20:45

## 2025-06-23 NOTE — BH INPATIENT PSYCHIATRY PROGRESS NOTE - MSE UNSTRUCTURED FT
Appearance: Dressed appropriately.  Behavior: Cooperative.  resting in room  Motor: No abnormal movements, no psychomotor slowing or activation.  Speech: Regular rate.  Mood: "So-So"  Affect: Anxious.  Thought Process: Linear.  Associations: Fair.  Thought Content: Ruminations. Denies SIIP or HIIP.  Insight: Limited.  Judgment: Fair on interview.  Attention: Fair.  Language: Fluent.  Gait: Supine.
Appearance: Dressed appropriately.  Behavior: Cooperative.  resting in room  Motor: No abnormal movements, no psychomotor slowing or activation.  Speech: Regular rate.  Mood: "Anxious."  Affect: Anxious.  Thought Process: Linear.  Associations: Fair.  Thought Content: Ruminations. Denies SIIP or HIIP.  Insight: Limited.  Judgment: Fair on interview.  Attention: Fair.  Language: Fluent.  Gait: Supine.
Appearance: Dressed appropriately.  Behavior: Cooperative. resting in room.  Motor: No abnormal movements, no psychomotor slowing or activation.  Speech: Regular rate.  Mood: "Good"  Affect: Pleasant.  Thought Process: Linear.  Associations: Fair.  Thought Content: Ruminations. Denies SIIP or HIIP.  Insight: Limited.  Judgment: Fair on interview.  Attention: Fair.  Language: Fluent.  
Appearance: Dressed appropriately.  Behavior: Cooperative. resting in room.  Motor: No abnormal movements, no psychomotor slowing or activation.  Speech: Regular rate.  Mood: "Good"  Affect: Pleasant.  Thought Process: Linear.  Associations: Fair.  Thought Content: Ruminations. Denies SIIP or HIIP.  Insight: Limited.  Judgment: Fair on interview.  Attention: Fair.  Language: Fluent.  
Appearance: Dressed appropriately.  Behavior: Cooperative. resting in room.  Motor: No abnormal movements, no psychomotor slowing or activation.  Speech: Regular rate.  Mood: "Okay"  Affect: Pleasant.  Thought Process: Linear.  Associations: Fair.  Thought Content: Ruminations. Denies SIIP or HIIP.  Insight: Limited.  Judgment: Fair on interview.  Attention: Fair.  Language: Fluent.  
Appearance: Dressed appropriately.  Behavior: Cooperative. resting in room.  Motor: No abnormal movements, no psychomotor slowing or activation.  Speech: Regular rate.  Mood: "Good"  Affect: Pleasant.  Thought Process: Linear.  Associations: Fair.  Thought Content: Ruminations. Denies SIIP or HIIP.  Insight: Limited.  Judgment: Fair on interview.  Attention: Fair.  Language: Fluent.  
Appearance: Dressed appropriately.  Behavior: Cooperative. resting in room.  Motor: No abnormal movements, no psychomotor slowing or activation.  Speech: Regular rate.  Mood: "Okay"  Affect: Pleasant.  Thought Process: Linear.  Associations: Fair.  Thought Content: Ruminations. Denies SIIP or HIIP.  Insight: Limited.  Judgment: Fair on interview.  Attention: Fair.  Language: Fluent.  
Appearance: Dressed appropriately.  Behavior: Cooperative.  resting in room  Motor: No abnormal movements, no psychomotor slowing or activation.  Speech: Regular rate.  Mood: "Anxious."  Affect: Anxious.  Thought Process: Linear.  Associations: Fair.  Thought Content: Ruminations. Denies SIIP or HIIP.  Insight: Limited.  Judgment: Fair on interview.  Attention: Fair.  Language: Fluent.  Gait: Supine.
Appearance: Dressed appropriately.  Behavior: Cooperative. resting in room.  Motor: No abnormal movements, no psychomotor slowing or activation.  Speech: Regular rate.  Mood: "Good"  Affect: Pleasant.  Thought Process: Linear.  Associations: Fair.  Thought Content: Ruminations. Denies SIIP or HIIP.  Insight: Limited.  Judgment: Fair on interview.  Attention: Fair.  Language: Fluent.  
Appearance: Dressed appropriately.  Behavior: Cooperative.    Motor: No abnormal movements, no psychomotor slowing or activation.  Speech: Regular rate.  Mood: "Depressed."  Affect: Depressed  Thought Process: Linear.  Associations: Fair.  Thought Content: Denies AVH.  Denies SIIP or HIIP.  Insight: Limited.  Judgment: Fair on interview.  Attention: Fair.  Language: Fluent.  Gait: Intact. 
Appearance: Dressed appropriately.  Behavior: Cooperative.  resting in room  Motor: No abnormal movements, no psychomotor slowing or activation.  Speech: Regular rate.  Mood: "So-So"  Affect: Anxious.  Thought Process: Linear.  Associations: Fair.  Thought Content: Ruminations. Denies SIIP or HIIP.  Insight: Limited.  Judgment: Fair on interview.  Attention: Fair.  Language: Fluent.  Gait: Supine.
Appearance: Dressed appropriately.  Behavior: Cooperative. resting in room.  Motor: No abnormal movements, no psychomotor slowing or activation.  Speech: Regular rate.  Mood: "Good"  Affect: Pleasant.  Thought Process: Linear.  Associations: Fair.  Thought Content: Ruminations. Denies SIIP or HIIP.  Insight: Limited.  Judgment: Fair on interview.  Attention: Fair.  Language: Fluent.  
Appearance: Dressed appropriately.  Behavior: Cooperative. resting in room.  Motor: No abnormal movements, no psychomotor slowing or activation.  Speech: Regular rate.  Mood: "Good"  Affect: Pleasant.  Thought Process: Linear.  Associations: Fair.  Thought Content: Ruminations. Denies SIIP or HIIP.  Insight: Limited.  Judgment: Fair on interview.  Attention: Fair.  Language: Fluent.  
Appearance: Dressed appropriately.  Behavior: Cooperative.  resting in room  Motor: No abnormal movements, no psychomotor slowing or activation.  Speech: Regular rate.  Mood: "Good"  Affect: Pleasant.  Thought Process: Linear.  Associations: Fair.  Thought Content: Ruminations. Denies SIIP or HIIP.  Insight: Limited.  Judgment: Fair on interview.  Attention: Fair.  Language: Fluent.

## 2025-06-23 NOTE — BH INPATIENT PSYCHIATRY PROGRESS NOTE - NSTXANXDATETRGT_PSY_ALL_CORE
26-Jun-2025
11-Jun-2025
20-Jun-2025
26-Jun-2025
11-Jun-2025
20-Jun-2025
20-Jun-2025
26-Jun-2025
11-Jun-2025
20-Jun-2025
11-Jun-2025

## 2025-06-23 NOTE — BH INPATIENT PSYCHIATRY PROGRESS NOTE - NSBHFUPINTERVALHXFT_PSY_A_CORE
Chart was reviewed and case was discussed with team, vitals were reviewed. Patient reported feeling anxious over the weekend due to tenuous peer. Patient was able to put it into perspective and understand peer was struggling and she was thankful not to be in the same state. Patient is looking forward to discharge tomorrow. Patient feels much better mentally and physically and is happy to have come off some of her prior medications. Patient understands she needs to continue medications as prescribed. Patient also understands she needs to continue treatment post discharge. She denied SIIP, HIIP, AVH.

## 2025-06-23 NOTE — BH INPATIENT PSYCHIATRY PROGRESS NOTE - NSTXANXDATEEST_PSY_ALL_CORE
04-Jun-2025

## 2025-06-23 NOTE — BH INPATIENT PSYCHIATRY PROGRESS NOTE - NSICDXBHPRIMARYDX_PSY_ALL_CORE
MDD (major depressive disorder)   F32.9  

## 2025-06-23 NOTE — BH INPATIENT PSYCHIATRY PROGRESS NOTE - NSTXDCOPLKINTERMD_PSY_ALL_CORE
med management/ group, milieu therapy
med management/ group, milieu therapy.
med management/ group, milieu therapy.
med management/ group, milieu therapy
med management/ group, milieu therapy.
med management/ group, milieu therapy
med management/ group, milieu therapy.
med management/ group, milieu therapy.
med management/ group, milieu therapy

## 2025-06-23 NOTE — BH INPATIENT PSYCHIATRY PROGRESS NOTE - NSTXDEPRESINTERMD_PSY_ALL_CORE
med management/ group, milieu therapy

## 2025-06-23 NOTE — BH INPATIENT PSYCHIATRY PROGRESS NOTE - NSTXANXGOAL_PSY_ALL_CORE
Be able to perform ADLs and maintain safety despite anxiety/panic daily
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
## 2025-06-23 NOTE — BH INPATIENT PSYCHIATRY PROGRESS NOTE - MSE OPTIONS
Unstructured MSE
Structured MSE
Unstructured MSE
Structured MSE
Unstructured MSE

## 2025-06-23 NOTE — BH INPATIENT PSYCHIATRY PROGRESS NOTE - CURRENT MEDICATION
MEDICATIONS  (STANDING):  atorvastatin 10 milliGRAM(s) Oral at bedtime  ferrous    sulfate 325 milliGRAM(s) Oral at bedtime  fluticasone propionate/ salmeterol 100-50 MICROgram(s) Diskus 1 Dose(s) Inhalation two times a day  gabapentin 300 milliGRAM(s) Oral at bedtime  glucagon  Injectable 1 milliGRAM(s) IntraMuscular once  insulin lispro (ADMELOG) corrective regimen sliding scale   SubCutaneous with breakfast  levothyroxine 88 MICROGram(s) Oral <User Schedule>  LORazepam     Tablet 0.5 milliGRAM(s) Oral two times a day  melatonin. 6 milliGRAM(s) Oral at bedtime  metFORMIN 500 milliGRAM(s) Oral daily  nortriptyline 25 milliGRAM(s) Oral at bedtime  pancrelipase  (CREON 36,000 Lipase Units) 2 Capsule(s) Oral four times a day with meals  pantoprazole    Tablet 40 milliGRAM(s) Oral before breakfast  senna 2 Tablet(s) Oral at bedtime  sertraline 100 milliGRAM(s) Oral daily    MEDICATIONS  (PRN):  acetaminophen     Tablet .. 650 milliGRAM(s) Oral every 6 hours PRN Mild Pain (1 - 3), Moderate Pain (4 - 6), Severe Pain (7 - 10)  albuterol    90 MICROgram(s) HFA Inhaler 2 Puff(s) Inhalation every 6 hours PRN Asthma  artificial  tears Solution 1 Drop(s) Both EYES four times a day PRN dry eyes  dextrose Oral Gel 15 Gram(s) Oral once PRN Blood Glucose LESS THAN 70 milliGRAM(s)/deciliter  EPINEPHrine     1 mG/mL Injectable 0.3 milliGRAM(s) IntraMuscular once PRN ANAPHYLAXIS  lidocaine   4% Patch 1 Patch Transdermal daily PRN Low back pain  LORazepam     Tablet 1 milliGRAM(s) Oral every 6 hours PRN Agitation 2/2 mood  LORazepam   Injectable 1 milliGRAM(s) IntraMuscular once PRN Agitation 2/2 mood  ondansetron    Tablet 4 milliGRAM(s) Oral every 6 hours PRN Nausea/vomiting  polyethylene glycol 3350 17 Gram(s) Oral daily PRN Constipation  traZODone 50 milliGRAM(s) Oral at bedtime PRN Insomnia

## 2025-06-23 NOTE — BH INPATIENT PSYCHIATRY PROGRESS NOTE - NSTREATMENTCERTY_PSY_ALL_CORE
Addended by: TATYANA RUIZ on: 12/29/2020 02:09 PM     Modules accepted: Orders, SmartSet    
That inpatient services furnished since the previous certification or recertification were, and continue to be required: for treatment that could reasonably be expected to improve the patient's condition; or, for diagnostic study;    That the hospital records show that the services furnished were: intensive treatment services; admission and related services necessary for diagnostic study or equivalent services;    That the patient continues to need, on a daily basis active inpatient treatment furnished directly by or requiring the supervision of inpatient psychiatric facility personnel.

## 2025-06-23 NOTE — BH INPATIENT PSYCHIATRY PROGRESS NOTE - NSBHMETABOLIC_PSY_ALL_CORE_FT
BMI: BMI (kg/m2): 22.4 (06-14-25 @ 08:12)  HbA1c: A1C with Estimated Average Glucose Result: 5.7 % (06-05-25 @ 08:00)    Glucose: POCT Blood Glucose.: 112 mg/dL (06-23-25 @ 08:14)    BP: --Vital Signs Last 24 Hrs  T(C): 36.9 (06-23-25 @ 08:49), Max: 36.9 (06-23-25 @ 08:49)  T(F): 98.5 (06-23-25 @ 08:49), Max: 98.5 (06-23-25 @ 08:49)  HR: --  BP: --  BP(mean): --  RR: --  SpO2: --    Orthostatic VS  06-23-25 @ 08:49  Lying BP: --/-- HR: --  Sitting BP: 113/66 HR: 80  Standing BP: 115/59 HR: 89  Site: --  Mode: --  Orthostatic VS  06-22-25 @ 07:53  Lying BP: --/-- HR: --  Sitting BP: 118/62 HR: 80  Standing BP: 122/67 HR: 81  Site: --  Mode: --    Lipid Panel: Date/Time: 06-05-25 @ 08:00  Cholesterol, Serum: 183  LDL Cholesterol Calculated: 97  HDL Cholesterol, Serum: 66  Total Cholesterol/HDL Ration Measurement: --  Triglycerides, Serum: 112

## 2025-06-23 NOTE — BH INPATIENT PSYCHIATRY PROGRESS NOTE - NSBHASSESSSUMMFT_PSY_ALL_CORE
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 Binh Valles), PMH of DM, HLD, asthma, hypothyroid, fibromyalgia, denies past violence/NSSI/SA/substance, BIBS for depression and SI.    On assessment, pt was calm, reports improved mood, tolerating medications. Pt reports overall symptomatic improvement, appears calmer, less tearful. Pt feels comfortable being discharged to family home.     Plan:   -[facility], 9[facility]13  -No CO needed, routine observation, q15 checks   -Psychiatry:   gabapentin 300 milliGRAM(s) Oral at bedtime  LORazepam     Tablet 0.5 milliGRAM(s) Oral three times a day  sertraline 100 milliGRAM(s) Oral daily  -Medical:   Pt described feeling dizzy for 2 days (6/11-6/12), hospitalist consulted. BP/orthostatics WNL, EKG is NSR, F/u CBC/CMP. Per hospitalist, most likely multifactorial due to polypharmacy, poor PO intake, and lower BP. Recommendations:  - recommend head CT  - plan to hold metformin and ctm to blood surgar and reevaluate  - plan to hold metoprolol and ctm blood presures and reevaluate  - lower gabapentin to 300mg  - recommend titrate off ativan if possible  - trial meclizine if QTc is WNL (EKG from 6/12 showed QTc 348)  Pt requested Tylenol PM, pt already has acetaminophen PRN order, added diphenhydramine 25 mg PO QHS PRN for insomnia    nitrofuratntoin 100mg BID for 5 days (6/05 - 6/10) for UTI.   atorvastatin 10 milliGRAM(s) Oral at bedtime  ferrous    sulfate 325 milliGRAM(s) Oral at bedtime  fluticasone propionate/ salmeterol 100-50 MICROgram(s) Diskus 1 Dose(s) Inhalation two times a day  glucagon  Injectable 1 milliGRAM(s) IntraMuscular once  insulin lispro (ADMELOG) corrective regimen sliding scale   SubCutaneous three times a day before meals  levothyroxine 88 MICROGram(s) Oral <User Schedule>  metFORMIN 500 milliGRAM(s) Oral daily  metoprolol succinate ER 25 milliGRAM(s) Oral daily  moxifloxacin 0.5% Solution 1 Drop(s) Right EYE three times a day  nortriptyline 25 milliGRAM(s) Oral at bedtime  pancrelipase  (CREON 36,000 Lipase Units) 1 Capsule(s) Oral three times a day with meals  pantoprazole    Tablet 40 milliGRAM(s) Oral before breakfast  prednisoLONE acetate 1% Suspension 1 Drop(s) Right EYE three times a day  acetaminophen     Tablet .. 650 milliGRAM(s) Oral every 6 hours PRN Mild Pain (1 - 3), Moderate Pain (4 - 6), Severe Pain (7 - 10)  albuterol    90 MICROgram(s) HFA Inhaler 2 Puff(s) Inhalation every 6 hours PRN Asthma  dextrose Oral Gel 15 Gram(s) Oral once PRN Blood Glucose LESS THAN 70 milliGRAM(s)/deciliter  EPINEPHrine     1 mG/mL Injectable 0.3 milliGRAM(s) IntraMuscular once PRN ANAPHYLAXIS  ondansetron    Tablet 4 milliGRAM(s) Oral every 6 hours PRN Nausea/vomiting  -Group and milieu therapy  -Dispo as per SW

## 2025-06-23 NOTE — BH INPATIENT PSYCHIATRY PROGRESS NOTE - NSBHCHARTREVIEWVS_PSY_A_CORE FT
Vital Signs Last 24 Hrs  T(C): 36.9 (06-23-25 @ 08:49), Max: 36.9 (06-23-25 @ 08:49)  T(F): 98.5 (06-23-25 @ 08:49), Max: 98.5 (06-23-25 @ 08:49)  HR: --  BP: --  BP(mean): --  RR: --  SpO2: --    Orthostatic VS  06-23-25 @ 08:49  Lying BP: --/-- HR: --  Sitting BP: 113/66 HR: 80  Standing BP: 115/59 HR: 89  Site: --  Mode: --  Orthostatic VS  06-22-25 @ 07:53  Lying BP: --/-- HR: --  Sitting BP: 118/62 HR: 80  Standing BP: 122/67 HR: 81  Site: --  Mode: --

## 2025-06-23 NOTE — BH INPATIENT PSYCHIATRY PROGRESS NOTE - PRN MEDS
MEDICATIONS  (PRN):  acetaminophen     Tablet .. 650 milliGRAM(s) Oral every 6 hours PRN Mild Pain (1 - 3), Moderate Pain (4 - 6), Severe Pain (7 - 10)  albuterol    90 MICROgram(s) HFA Inhaler 2 Puff(s) Inhalation every 6 hours PRN Asthma  artificial  tears Solution 1 Drop(s) Both EYES four times a day PRN dry eyes  dextrose Oral Gel 15 Gram(s) Oral once PRN Blood Glucose LESS THAN 70 milliGRAM(s)/deciliter  EPINEPHrine     1 mG/mL Injectable 0.3 milliGRAM(s) IntraMuscular once PRN ANAPHYLAXIS  lidocaine   4% Patch 1 Patch Transdermal daily PRN Low back pain  LORazepam     Tablet 1 milliGRAM(s) Oral every 6 hours PRN Agitation 2/2 mood  LORazepam   Injectable 1 milliGRAM(s) IntraMuscular once PRN Agitation 2/2 mood  ondansetron    Tablet 4 milliGRAM(s) Oral every 6 hours PRN Nausea/vomiting  polyethylene glycol 3350 17 Gram(s) Oral daily PRN Constipation  traZODone 50 milliGRAM(s) Oral at bedtime PRN Insomnia

## 2025-06-23 NOTE — BH INPATIENT PSYCHIATRY PROGRESS NOTE - NSDCCRITERIA_PSY_ALL_CORE
Improved mood and functioning, absence of suicidal thoughts, CGI </=3

## 2025-06-23 NOTE — BH INPATIENT PSYCHIATRY PROGRESS NOTE - NSBHFUPINTERVALCCFT_PSY_A_CORE
Seen for depression
"Thank God I'm not that bad"
"I'm feeling dizzy"
"I'm feeling good"
"I'm feeling okay"
"I'm anxious"
"I'm feeling bad"
Seen for depression
"I don't even get an apologize"
Seen for depression
"I'm feeling good"
"I'm feeling the same"
"Feeling the same"
"I'm feeling bad"
"I'm feeling good"
"I'm feeling really good"

## 2025-06-23 NOTE — BH INPATIENT PSYCHIATRY PROGRESS NOTE - NSTXHEALTHINTERMD_PSY_ALL_CORE
Med management; group and milieu therapy 

## 2025-06-23 NOTE — BH INPATIENT PSYCHIATRY PROGRESS NOTE - NSBHATTESTAPPBILLTIME_PSY_A_CORE
I attest my time as JENNIFER is greater than 50% of the total combined time spent on qualifying patient care activities. I have reviewed and verified the documentation.

## 2025-06-23 NOTE — BH INPATIENT PSYCHIATRY PROGRESS NOTE - NSTXDEPRESGOAL_PSY_ALL_CORE
Exhibit improvements in self-grooming, hygiene, sleep and appetite

## 2025-06-23 NOTE — BH INPATIENT PSYCHIATRY PROGRESS NOTE - NSTXDCOPLKDATETRGT_PSY_ALL_CORE
13-Jun-2025
27-Jun-2025
13-Jun-2025
20-Jun-2025
13-Jun-2025
26-Jun-2025
13-Jun-2025
20-Jun-2025
26-Jun-2025
13-Jun-2025
20-Jun-2025
13-Jun-2025
20-Jun-2025
13-Jun-2025
13-Jun-2025

## 2025-06-23 NOTE — BH INPATIENT PSYCHIATRY PROGRESS NOTE - NSTXDEPRESDATETRGT_PSY_ALL_CORE
26-Jun-2025
20-Jun-2025
11-Jun-2025
20-Jun-2025
20-Jun-2025
11-Jun-2025
11-Jun-2025
26-Jun-2025
11-Jun-2025
11-Jun-2025
rolling walker
26-Jun-2025
11-Jun-2025
20-Jun-2025
11-Jun-2025

## 2025-06-24 VITALS — WEIGHT: 120.15 LBS | RESPIRATION RATE: 18 BRPM | TEMPERATURE: 99 F

## 2025-06-24 LAB — GLUCOSE BLDC GLUCOMTR-MCNC: 112 MG/DL — HIGH (ref 70–99)

## 2025-06-24 PROCEDURE — 99238 HOSP IP/OBS DSCHRG MGMT 30/<: CPT

## 2025-06-24 RX ADMIN — Medication 40 MILLIGRAM(S): at 09:31

## 2025-06-24 RX ADMIN — Medication 1 DOSE(S): at 10:17

## 2025-06-24 RX ADMIN — Medication 0.5 MILLIGRAM(S): at 09:31

## 2025-06-24 RX ADMIN — Medication 88 MICROGRAM(S): at 06:49

## 2025-06-24 RX ADMIN — SERTRALINE 100 MILLIGRAM(S): 100 TABLET, FILM COATED ORAL at 09:31
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
## 2025-06-24 NOTE — BH INPATIENT PSYCHIATRY DISCHARGE NOTE - REASON FOR ADMISSION
Patient brought self to ED for suicidal thoughts with plan to use a knife but no intent, sad mood, lack of enjoyment in things,  related to medication non compliance and recent marital infidelity.

## 2025-06-24 NOTE — BH DISCHARGE NOTE NURSING/SOCIAL WORK/PSYCH REHAB - FINANCIAL ASSISTANCE
[facility] provides services at a reduced cost to those who are determined to be eligible through [facility]’s financial assistance program. Information regarding [facility]’s financial assistance program can be found by going to [email or web address] or by calling [phone].

## 2025-06-24 NOTE — BH INPATIENT PSYCHIATRY DISCHARGE NOTE - NSBHSUICIDESTATUS_PSY_ALL_CORE
Patient has denied SIIP. She is future oriented and goal directed. Patient has a strong [employer] clifton.

## 2025-06-24 NOTE — BH DISCHARGE NOTE NURSING/SOCIAL WORK/PSYCH REHAB - DISCHARGE INSTRUCTIONS AFTERCARE APPOINTMENTS
In order to check the location, date, or time of your aftercare appointment, please refer to your Discharge Instructions Document given to you upon leaving the hospital.  If you have lost the instructions please call [phone]
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
## 2025-06-24 NOTE — BH DISCHARGE NOTE NURSING/SOCIAL WORK/PSYCH REHAB - NSCDUDCCRISIS_PSY_A_CORE
[facility] Well  1 [phone]) [facility]-WELL ([phone])  Text "WELL" to 21881  Website: www.[employer]/.[facility] 1 (528) 621-HOPE (8104) Website: www.safehorizon.org/.National Suicide Prevention Lifeline 9 (659) 502-7027/.  [facility]  1 (585) LIFENET (348-2189)/.  [facility]  (329) 558-6992/.  [facility] Behavioral Health Helpline / [facility]  (808) 227-TALK (2241)/.  [facility]  [phone] [place] [address] [phone]   Hours:  Monday through Friday from 9 AM to 3 PM/988 Suicide and Crisis Lifeline

## 2025-06-24 NOTE — BH DISCHARGE NOTE NURSING/SOCIAL WORK/PSYCH REHAB - NSBHDCAGENCY1FT_PSY_A_CORE
[facility] - Intake with Vanessa Victoria

## 2025-06-24 NOTE — BH INPATIENT PSYCHIATRY DISCHARGE NOTE - MSE UNSTRUCTURED FT
Appearance: Dressed appropriately.  Behavior: Cooperative. resting in room.  Motor: No abnormal movements, no psychomotor slowing or activation.  Speech: Regular rate.  Mood: "Good"  Affect: Pleasant.  Thought Process: Linear.  Associations: Fair.  Thought Content: Ruminations. Denies SIIP or HIIP.  Insight: Limited.  Judgment: Fair on interview.  Attention: Fair.  Language: Fluent.

## 2025-06-24 NOTE — BH INPATIENT PSYCHIATRY DISCHARGE NOTE - NSDCMRMEDTOKEN_GEN_ALL_CORE_FT
atorvastatin 10 mg oral tablet: 1 tab(s) orally once a day (at bedtime)  ferrous sulfate 325 mg (65 mg elemental iron) oral tablet: 1 tab(s) orally once a day (at bedtime)  gabapentin 300 mg oral capsule: 1 cap(s) orally once a day (at bedtime) MDD: 300mg  levothyroxine 88 mcg (0.088 mg) oral tablet: 1 tab(s) orally once a day before breakfast  LORazepam 0.5 mg oral tablet: 1 tab(s) orally 2 times a day MDD: 1mg  melatonin 3 mg oral tablet: 2 tab(s) orally once a day (at bedtime)  metFORMIN 500 mg oral tablet: 1 tab(s) orally once a day  nortriptyline 25 mg oral capsule: 1 cap(s) orally once a day (at bedtime)  pancrelipase 36,000 units-114,000 units-180,000 units oral delayed release capsule: 2 cap(s) orally 4 times a day (with meals and at bedtime)  pantoprazole 40 mg oral delayed release tablet: 1 tab(s) orally once a day (before a meal)  ProAir RespiClick 90 mcg/inh inhalation powder: 2 puff(s) inhaled every 4 hours, As Needed  Zoloft 100 mg oral tablet: 1 tab(s) orally once a day

## 2025-06-24 NOTE — BH INPATIENT PSYCHIATRY DISCHARGE NOTE - ATTENDING DISCHARGE PHYSICAL EXAMINATION:
Care was discussed and reviewed in the interdisciplinary treatment team.  I, Liza Cope MD, have reviewed and verified the documentation.       Patient was seen and evaluated today by this writer. Patient reported that she is feeling much better and is looking forward to being able to leave the hospital. Patient is denying any SI and has been able to contract for safety. Denies any HI or hallucinations, and no delusions have been elicited. The patient has been educated about the safety plan and understands that if she feels like harming herself or others, she can call 911 or go to any ER. During the hospital stay, the patient has not demonstrated any aggressive or self-injurious behaviors, and this has been consistent with my observations and the observations of the staff.

## 2025-06-24 NOTE — BH DISCHARGE NOTE NURSING/SOCIAL WORK/PSYCH REHAB - NSBHDCADDR1FT_A_CORE
After obtaining consent, and per orders of Dr. Zia Wiley, injection of Hep A  given in Left arm by Yovana Thompson. Patient instructed to remain in clinic for 20 minutes afterwards, and to report any adverse reaction to me immediately. [address], [place] [address]

## 2025-06-24 NOTE — BH INPATIENT PSYCHIATRY DISCHARGE NOTE - NSDCCPCAREPLAN_GEN_ALL_CORE_FT
PRINCIPAL DISCHARGE DIAGNOSIS  Diagnosis: MDD (major depressive disorder)  Assessment and Plan of Treatment:       SECONDARY DISCHARGE DIAGNOSES  Diagnosis: Insomnia  Assessment and Plan of Treatment:     Diagnosis: Pancreatitis  Assessment and Plan of Treatment:

## 2025-06-24 NOTE — BH INPATIENT PSYCHIATRY DISCHARGE NOTE - NSBHMETABOLIC_PSY_ALL_CORE_FT
BMI: BMI (kg/m2): 22 (06-24-25 @ 07:20)  HbA1c: A1C with Estimated Average Glucose Result: 5.7 % (06-05-25 @ 08:00)    Glucose: POCT Blood Glucose.: 112 mg/dL (06-24-25 @ 08:14)    BP: --Vital Signs Last 24 Hrs  T(C): 37 (06-24-25 @ 07:20), Max: 37 (06-24-25 @ 07:20)  T(F): 98.6 (06-24-25 @ 07:20), Max: 98.6 (06-24-25 @ 07:20)  HR: --  BP: --  BP(mean): --  RR: 18 (06-24-25 @ 07:20) (18 - 18)  SpO2: --    Orthostatic VS  06-24-25 @ 07:20  Lying BP: --/-- HR: --  Sitting BP: 130/65 HR: 78  Standing BP: 122/65 HR: 79  Site: --  Mode: --  Orthostatic VS  06-23-25 @ 08:49  Lying BP: --/-- HR: --  Sitting BP: 113/66 HR: 80  Standing BP: 115/59 HR: 89  Site: --  Mode: --    Lipid Panel: Date/Time: 06-05-25 @ 08:00  Cholesterol, Serum: 183  LDL Cholesterol Calculated: 97  HDL Cholesterol, Serum: 66  Total Cholesterol/HDL Ration Measurement: --  Triglycerides, Serum: 112

## 2025-06-24 NOTE — BH INPATIENT PSYCHIATRY DISCHARGE NOTE - NSBHDCHANDOFFFT_PSY_ALL_CORE
Discharge summary faxed to [facility]. Discussed treatment plan and meds included my contact information Namita Arrington NP, [phone].

## 2025-07-16 ENCOUNTER — APPOINTMENT (OUTPATIENT)
Dept: ORTHOPEDIC SURGERY | Facility: CLINIC | Age: 60
End: 2025-07-16
Payer: MEDICARE

## 2025-07-16 PROCEDURE — 99213 OFFICE O/P EST LOW 20 MIN: CPT

## 2025-07-16 RX ORDER — HYALURONATE SODIUM, STABILIZED 88 MG/4 ML
88 SYRINGE (ML) INTRAARTICULAR
Qty: 2 | Refills: 0 | Status: ACTIVE | COMMUNITY
Start: 2025-07-16

## 2025-07-22 ENCOUNTER — EMERGENCY (EMERGENCY)
Facility: HOSPITAL | Age: 60
LOS: 1 days | End: 2025-07-22
Attending: EMERGENCY MEDICINE | Admitting: EMERGENCY MEDICINE
Payer: MEDICARE

## 2025-07-22 VITALS
HEIGHT: 61 IN | SYSTOLIC BLOOD PRESSURE: 134 MMHG | WEIGHT: 119.93 LBS | RESPIRATION RATE: 18 BRPM | OXYGEN SATURATION: 98 % | TEMPERATURE: 98 F | HEART RATE: 100 BPM | DIASTOLIC BLOOD PRESSURE: 66 MMHG

## 2025-07-22 DIAGNOSIS — Z98.890 OTHER SPECIFIED POSTPROCEDURAL STATES: Chronic | ICD-10-CM

## 2025-07-22 DIAGNOSIS — Z98.89 OTHER SPECIFIED POSTPROCEDURAL STATES: Chronic | ICD-10-CM

## 2025-07-22 DIAGNOSIS — Z90.710 ACQUIRED ABSENCE OF BOTH CERVIX AND UTERUS: Chronic | ICD-10-CM

## 2025-07-22 DIAGNOSIS — N31.8 OTHER NEUROMUSCULAR DYSFUNCTION OF BLADDER: Chronic | ICD-10-CM

## 2025-07-22 DIAGNOSIS — N39.3 STRESS INCONTINENCE (FEMALE) (MALE): Chronic | ICD-10-CM

## 2025-07-22 DIAGNOSIS — Z98.51 TUBAL LIGATION STATUS: Chronic | ICD-10-CM

## 2025-07-22 DIAGNOSIS — S62.102A FRACTURE OF UNSPECIFIED CARPAL BONE, LEFT WRIST, INITIAL ENCOUNTER FOR CLOSED FRACTURE: Chronic | ICD-10-CM

## 2025-07-22 PROCEDURE — 99284 EMERGENCY DEPT VISIT MOD MDM: CPT

## 2025-07-22 PROCEDURE — 99283 EMERGENCY DEPT VISIT LOW MDM: CPT

## 2025-07-22 RX ORDER — DEXTROMETHORPHAN HBR, GUAIFENESIN 200 MG/10ML
1 LIQUID ORAL
Qty: 15 | Refills: 0
Start: 2025-07-22 | End: 2025-07-26

## 2025-07-22 RX ORDER — ALPRAZOLAM 0.5 MG
0.5 TABLET, EXTENDED RELEASE 24 HR ORAL ONCE
Refills: 0 | Status: DISCONTINUED | OUTPATIENT
Start: 2025-07-22 | End: 2025-07-22

## 2025-07-22 RX ORDER — ALPRAZOLAM 0.5 MG
1 TABLET, EXTENDED RELEASE 24 HR ORAL
Qty: 8 | Refills: 0
Start: 2025-07-22 | End: 2025-07-25

## 2025-07-22 RX ADMIN — Medication 0.5 MILLIGRAM(S): at 21:21

## 2025-07-22 NOTE — ED PROVIDER NOTE - OBJECTIVE STATEMENT
Patient states that she has a history of anxiety and depression and over the last 2 to 3 days has been complaining of increased anxiety and "shortness of breath" and feels like she has a lot of cough and phlegm.  Patient denies any fever.  Patient states that she is on an antidepressant and was on alprazolam but her doctor changed it to lorazepam which she states does not work.

## 2025-07-22 NOTE — ED ADULT NURSE NOTE - NSICDXPASTSURGICALHX_GEN_ALL_CORE_FT
PAST SURGICAL HISTORY:  Female stress incontinence sling procedure ; revision x 2 last     H/O abdominal hysterectomy     H/O arthroscopic knee surgery left    H/O arthroscopy Right Shoulder    H/O bilateral inguinal hernia repair     H/O carpal tunnel repair RIGHT Hand 2017    H/O cervical polypectomy     H/O colonoscopy with polypectomy benign    H/O colposcopy with cervical biopsy     H/O thyroidectomy     H/O tubal ligation     H/O:      History of D&C     Left wrist fracture 10 years ago with repair and placement of pins     (normal spontaneous vaginal delivery)     Other functional disorder of bladder bladder suspension for incontience    S/P carpal tunnel release left; and left middle trigger finger release; 17    S/P trigger finger release RIGHT Middle Finger 2017

## 2025-07-22 NOTE — ED PROVIDER NOTE - PATIENT PORTAL LINK FT
You can access the FollowMyHealth Patient Portal offered by [facility] by registering at the following website: [email or web address]. By joining [employer]’s FollowMyHealth portal, you will also be able to view your health information using other applications (apps) compatible with our system.

## 2025-07-22 NOTE — ED ADULT NURSE NOTE - OBJECTIVE STATEMENT
Pt presents to the ER complaining of 3 days of increased anxiety which she feels shortness of breath. Pt states she has a cough and phlegm. A&OX4. Pt denies chest pain, nausea, vomiting, fevers, dizziness or headache.

## 2025-07-22 NOTE — ED PROVIDER NOTE - PHYSICAL EXAMINATION
Gen: alert, NAD  HEENT:  NC/AT, PERR  CV:  well perfused  Pulm: CTA b/l  Abd: s/nt/nd  MSK: moving all extremities  Neuro:  non-focal  Skin:  visualized areas intact  Psych: AOx3

## 2025-07-22 NOTE — ED PROVIDER NOTE - NSFOLLOWUPINSTRUCTIONS_ED_ALL_ED_FT
-- You should update your primary care physician on your Emergency Department visit and follow up with them.  If you do not have a physician or have difficulty following up, please call: [phone] to obtain a [facility] doctor or specialist who can provide follow up.    -- Return to the ER for worsening or persistent symptoms, and/or ANY NEW OR CONCERNING SYMPTOMS.

## 2025-07-22 NOTE — ED PROVIDER NOTE - CLINICAL SUMMARY MEDICAL DECISION MAKING FREE TEXT BOX
pt with likely anxiety attack given that periodically she feels completely fine and sx come in bouts, good improvement with alprazolam, pt stable for dc

## 2025-08-07 ENCOUNTER — APPOINTMENT (OUTPATIENT)
Dept: ORTHOPEDIC SURGERY | Facility: CLINIC | Age: 60
End: 2025-08-07
Payer: MEDICARE

## 2025-08-07 PROCEDURE — 20610 DRAIN/INJ JOINT/BURSA W/O US: CPT | Mod: LT

## 2025-08-07 PROCEDURE — 99213 OFFICE O/P EST LOW 20 MIN: CPT | Mod: 25

## 2025-08-13 ENCOUNTER — APPOINTMENT (OUTPATIENT)
Dept: ORTHOPEDIC SURGERY | Facility: CLINIC | Age: 60
End: 2025-08-13
Payer: MEDICARE

## 2025-08-13 DIAGNOSIS — M17.0 BILATERAL PRIMARY OSTEOARTHRITIS OF KNEE: ICD-10-CM

## 2025-08-13 PROCEDURE — 99213 OFFICE O/P EST LOW 20 MIN: CPT | Mod: 25

## 2025-08-13 PROCEDURE — 20610 DRAIN/INJ JOINT/BURSA W/O US: CPT | Mod: LT

## 2025-08-20 ENCOUNTER — APPOINTMENT (OUTPATIENT)
Dept: ORTHOPEDIC SURGERY | Facility: CLINIC | Age: 60
End: 2025-08-20
Payer: MEDICARE

## 2025-08-20 DIAGNOSIS — M17.12 UNILATERAL PRIMARY OSTEOARTHRITIS, LEFT KNEE: ICD-10-CM

## 2025-08-20 PROCEDURE — 20610 DRAIN/INJ JOINT/BURSA W/O US: CPT | Mod: LT

## 2025-09-02 ENCOUNTER — NON-APPOINTMENT (OUTPATIENT)
Age: 60
End: 2025-09-02

## 2025-09-03 ENCOUNTER — APPOINTMENT (OUTPATIENT)
Dept: ORTHOPEDIC SURGERY | Facility: CLINIC | Age: 60
End: 2025-09-03
Payer: MEDICARE

## 2025-09-03 DIAGNOSIS — M76.71 PERONEAL TENDINITIS, RIGHT LEG: ICD-10-CM

## 2025-09-03 DIAGNOSIS — M76.821 POSTERIOR TIBIAL TENDINITIS, RIGHT LEG: ICD-10-CM

## 2025-09-03 DIAGNOSIS — M17.0 BILATERAL PRIMARY OSTEOARTHRITIS OF KNEE: ICD-10-CM

## 2025-09-03 PROCEDURE — 99214 OFFICE O/P EST MOD 30 MIN: CPT | Mod: 25

## 2025-09-03 PROCEDURE — 20610 DRAIN/INJ JOINT/BURSA W/O US: CPT | Mod: LT
